# Patient Record
Sex: FEMALE | Race: WHITE | NOT HISPANIC OR LATINO | Employment: OTHER | URBAN - METROPOLITAN AREA
[De-identification: names, ages, dates, MRNs, and addresses within clinical notes are randomized per-mention and may not be internally consistent; named-entity substitution may affect disease eponyms.]

---

## 2017-04-03 ENCOUNTER — ALLSCRIPTS OFFICE VISIT (OUTPATIENT)
Dept: OTHER | Facility: OTHER | Age: 72
End: 2017-04-03

## 2017-04-03 DIAGNOSIS — E78.00 PURE HYPERCHOLESTEROLEMIA: ICD-10-CM

## 2017-04-03 DIAGNOSIS — Z12.31 ENCOUNTER FOR SCREENING MAMMOGRAM FOR MALIGNANT NEOPLASM OF BREAST: ICD-10-CM

## 2017-04-21 ENCOUNTER — ALLSCRIPTS OFFICE VISIT (OUTPATIENT)
Dept: OTHER | Facility: OTHER | Age: 72
End: 2017-04-21

## 2017-04-21 ENCOUNTER — GENERIC CONVERSION - ENCOUNTER (OUTPATIENT)
Dept: OTHER | Facility: OTHER | Age: 72
End: 2017-04-21

## 2017-05-10 ENCOUNTER — TRANSCRIBE ORDERS (OUTPATIENT)
Dept: LAB | Age: 72
End: 2017-05-10

## 2017-05-10 ENCOUNTER — APPOINTMENT (OUTPATIENT)
Dept: LAB | Age: 72
End: 2017-05-10
Payer: MEDICARE

## 2017-05-10 DIAGNOSIS — E78.00 PURE HYPERCHOLESTEROLEMIA: ICD-10-CM

## 2017-05-10 LAB
ALBUMIN SERPL BCP-MCNC: 3.9 G/DL (ref 3.5–5)
ALP SERPL-CCNC: 57 U/L (ref 46–116)
ALT SERPL W P-5'-P-CCNC: 27 U/L (ref 12–78)
ANION GAP SERPL CALCULATED.3IONS-SCNC: 7 MMOL/L (ref 4–13)
AST SERPL W P-5'-P-CCNC: 20 U/L (ref 5–45)
BILIRUB SERPL-MCNC: 0.48 MG/DL (ref 0.2–1)
BUN SERPL-MCNC: 18 MG/DL (ref 5–25)
CALCIUM SERPL-MCNC: 9.5 MG/DL (ref 8.3–10.1)
CHLORIDE SERPL-SCNC: 108 MMOL/L (ref 100–108)
CHOLEST SERPL-MCNC: 175 MG/DL (ref 50–200)
CK SERPL-CCNC: 94 U/L (ref 26–192)
CO2 SERPL-SCNC: 27 MMOL/L (ref 21–32)
CREAT SERPL-MCNC: 0.7 MG/DL (ref 0.6–1.3)
GFR SERPL CREATININE-BSD FRML MDRD: >60 ML/MIN/1.73SQ M
GLUCOSE P FAST SERPL-MCNC: 98 MG/DL (ref 65–99)
HDLC SERPL-MCNC: 58 MG/DL (ref 40–60)
LDLC SERPL CALC-MCNC: 93 MG/DL (ref 0–100)
POTASSIUM SERPL-SCNC: 4.3 MMOL/L (ref 3.5–5.3)
PROT SERPL-MCNC: 7.3 G/DL (ref 6.4–8.2)
SODIUM SERPL-SCNC: 142 MMOL/L (ref 136–145)
TRIGL SERPL-MCNC: 122 MG/DL

## 2017-05-10 PROCEDURE — 82550 ASSAY OF CK (CPK): CPT

## 2017-05-10 PROCEDURE — 36415 COLL VENOUS BLD VENIPUNCTURE: CPT

## 2017-05-10 PROCEDURE — 80053 COMPREHEN METABOLIC PANEL: CPT

## 2017-05-10 PROCEDURE — 80061 LIPID PANEL: CPT

## 2017-10-03 ENCOUNTER — GENERIC CONVERSION - ENCOUNTER (OUTPATIENT)
Dept: OTHER | Facility: OTHER | Age: 72
End: 2017-10-03

## 2017-10-03 DIAGNOSIS — Z12.31 ENCOUNTER FOR SCREENING MAMMOGRAM FOR MALIGNANT NEOPLASM OF BREAST: ICD-10-CM

## 2017-10-03 DIAGNOSIS — F32.9 MAJOR DEPRESSIVE DISORDER, SINGLE EPISODE: ICD-10-CM

## 2017-10-03 DIAGNOSIS — E78.00 PURE HYPERCHOLESTEROLEMIA: ICD-10-CM

## 2018-01-11 NOTE — PROGRESS NOTES
Assessment    1  Encounter for preventive health examination (V70 0) (Z00 00)   2  Hypercholesterolemia (272 0) (E78 00)   3  Encounter for screening mammogram for breast cancer (V76 12) (Z12 31)   4  Skin tag (701 9) (L91 8)    Plan  Depression    · *VB-Depression Screening; Status:Complete - Retrospective By Protocol Authorization;    Done: 21Apr2017 08:53AM  Encounter for Medicare annual wellness exam    · Medicare Annual Wellness Visit; Status:Complete - Retrospective By Protocol  Authorization;   Done: 21Apr2017 08:53AM  Encounter for screening mammogram for breast cancer    · * MAMMO SCREENING BILATERAL W CAD; Status:Hold For - Scheduling; Requested  for:21Apr2017; Health Maintenance    · *VB - Fall Risk Assessment  (Dx Z13 89 Screen for Neurologic Disorder) ; every 1 year; Last 21Apr2017; Next 21Apr2018; Status:Active   · *VB - Urinary Incontinence Screen (Dx Z13 89 Screen for UI) ; every 1 year; Last  21Apr2017; Next 58Nnz2028; Status:Active   · *VB-Depression Screening ; every 1 year; Last 21Apr2017; Next 21Apr2018;  Status:Active   · Medicare Annual Wellness Visit ; every 1 year;  Last 21Apr2017; Next 21Apr2018;  Status:Active  Hypercholesterolemia    · Begin a limited exercise program ; Status:Complete;   Done: 21Apr2017   · Continue with our present treatment plan ; Status:Complete;   Done: 21Apr2017   · Eat a low fat and low cholesterol diet ; Status:Complete;   Done: 21Apr2017   · Keep a diary of when and what you eat ; Status:Complete;   Done: 21Apr2017   · Some eating tips that can help you lose weight ; Status:Complete;   Done: 21Apr2017   · There are many exercise options for seniors ; Status:Complete;   Done: 21Apr2017   · We want you to follow the Therapeutic Lifestyle Changes (TLC) diet ; Status:Complete;    Done: 21Apr2017   · Call [phone] if: You have muscle cramps ; Status:Complete;   Done: 21Apr2017   · Call [phone] if: You have pain in the stomach area ; Status:Complete; Done:  21Apr2017   · Call [phone] if: You start vomiting ; Status:Complete;   Done: 21Apr2017   · Call 911 if: You experience a new kind of chest pain (angina) or pressure ;  Status:Complete;   Done: 21Apr2017   · Call 911 if: You have any symptoms of a stroke ; Status:Complete;   Done: 21Apr2017  Need for vaccination with 13-polyvalent pneumococcal conjugate vaccine    · Prevnar 13 Intramuscular Suspension  Special screening for other conditions    · *VB - Urinary Incontinence Screen (Dx Z13 89 Screen for UI); Status:Complete -  Retrospective By Protocol Authorization;   Done: 21Apr2017 08:53AM  Unlinked    · *VB - Fall Risk Assessment  (Dx Z13 89 Screen for Neurologic Disorder);  Status:Complete - Retrospective By Protocol Authorization;   Done: 21Apr2017 08:52AM    Discussion/Summary  Impression: Subsequent Annual Wellness Visit  Cardiovascular screening and counseling: the risks and benefits of screening were discussed and screening is current  Diabetes screening and counseling: the risks and benefits of screening were discussed and screening is current  Colorectal cancer screening and counseling: the risks and benefits of screening were discussed and screening is current  Breast cancer screening and counseling: the risks and benefits of screening were discussed and due for a screening mammogram    Cervical cancer screening and counseling: the risks and benefits of screening were discussed and screening is current  Osteoporosis screening and counseling: the risks and benefits of screening were discussed and screening is current  Immunizations: the risks and benefits of influenza vaccination were discussed with the patient, influenza vaccine is up to date this year, the risks and benefits of pneumococcal vaccination were discussed with the patient and Prevnar 13 given today  Due for Pneumovax 23 in 1 year  Advance Directive Planning: complete and up to date   Advice and education were given regarding fall risk reduction, nutrition (non-diabetic), skin self-exam and Continue with physical activity  Patient Discussion: plan discussed with the patient, Keep routine f/u  Have labs completed prior to next f/u appt  Keep f/u with ortho for your knee  Also schedule f/u appt to have skin tag to bra line and L occipital removed as these are aggrevated by clothing and brushing hair  Chief Complaint  pt  presents for SUB AWV  History of Present Illness  HPI: Pt here for AWV  Pt states that she has L knee pain that has been aggravating her more lately  She is taking OTC IBU with some improvement  Pt has completed PT in past and has been doing home exercises  Pt states she is going to f/u with ortho  Welcome to [employer] and Wellness Visits: The patient is being seen for the subsequent annual wellness visit  Medicare Screening and Risk Factors   Hospitalizations: no previous hospitalizations and she has been hospitalized 0 times  Medicare Screening Tests Risk Questions   Osteoporosis risk assessment:  and female gender  HIV risk assessment: none indicated  Drug and Alcohol Use: The patient is a former cigarette smoker and quit smoking 1980  The patient reports occasional alcohol use and drinking 1 drinks per week  Alcohol concern:   The patient has no concerns about alcohol abuse  She has never used illicit drugs  Diet and Physical Activity: Current diet includes unhealthy food choices, 1 servings of fruit per day, 1 servings of vegetables per day, 1 servings of meat per day, 1 servings of whole grains per day, 1 servings of simple carbohydrates per day, 1 servings of dairy products per day, 2 cups of coffee per day, 0 cups of tea per day, 0 cans of regular soda per day and 0 cans of diet soda per day  She exercises daily and exercises 1 times per week  Exercise: walking 7 hours per week     Mood Disorder and Cognitive Impairment Screening: PHQ-9 Depression Scale   Over the past 2 weeks, how often have you been bothered by the following problems? 1 ) Little interest or pleasure in doing things? Not at all    2 ) Feeling down, depressed or hopeless? Not at all    3 ) Trouble falling asleep or sleeping too much? Not at all    4 ) Feeling tired or having little energy? Not at all    5 ) Poor appetite or overeating? Not at all    6 ) Feeling bad about yourself, or that you are a failure, or have let yourself or your family down? Not at all    7 ) Trouble concentrating on things, such as reading a newspaper or watching television? Not at all    8 ) Moving or speaking so slowly that other people could have noticed, or the opposite, moving or speaking faster than usual? Not at all  TOTAL SCORE: 0    How difficult have these problems made it for you to do your work, take care of things at home, or get along with people? Not at all  She denies feeling down, depressed, or hopeless over the past two weeks  She denies feeling little interest or pleasure in doing things over the past two weeks  Cognitive impairment screening: denies difficulty learning/retaining new information, denies difficulty handling complex tasks, denies difficulty with reasoning, denies difficulty with spatial ability and orientation, denies difficulty with language and denies difficulty with behavior  Functional Ability/Level of Safety: Hearing is normal bilaterally and a hearing aid is not used  She denies hearing difficulties  The patient is currently able to do activities of daily living without limitations, able to participate in social activities without limitations and able to drive without limitations  Activities of daily living details: does not need help using the phone, no transportation help needed, does not need help shopping, no meal preparation help needed, does not need help doing housework, does not need help doing laundry, does not need help managing medications and does not need help managing money   Fall risk factors:  antidepressant use, but The patient fell 2 times in the past 12 months  Home safety risk factors:  no unfamiliar surroundings, no loose rugs, no poor household lighting, no uneven floors, no household clutter, grab bars in the bathroom and handrails on the stairs  Advance Directives: Advance directives: living will and durable power of  for health care directives  Co-Managers and Medical Equipment/Suppliers: See Patient Care Team   Preventive Quality Program 65 and Older: Falls Risk: The patient fell 2 times in the past 12 months  Symptoms Include: leg weakness  Associated symptoms:  pain from the injury   knee  The patient currently has no urinary incontinence symptoms  Review of Systems    Constitutional: no fever, no chills, no malaise and no fatigue  Eyes: last eye exam 2017, but no blurred vision  ENT: no hearing loss, no nasal congestion and no sore throat  Cardiovascular: no chest pain, no palpitations, the heart is not racing, no lightheadedness and no lower extremity edema  Respiratory: no shortness of breath, no wheezing and no cough  Gastrointestinal: no abdominal pain, no nausea, no vomiting, no diarrhea, no constipation and no bright red blood per rectum  Genitourinary: no dysuria, no urinary frequency, no urinary urgency and no pelvic pain  Musculoskeletal: no diffuse joint pain and no joint swelling  Integumentary and Breasts: no rashes    The patient presents with complaints of skin lesion (skin tag and mole under bra line that sometimes gets irritated with bra  skin tag to L occipital)  Neurological: no headache, no confusion, no dizziness, no paresthesias and no difficulty walking  Psychiatric: no anxiety and no depression  Active Problems    1  Depression (311) (F32 9)   2  Gastroesophageal reflux disease without esophagitis (530 81) (K21 9)   3  Hypercholesterolemia (272 0) (E78 00)   4  Loss of height (781 91) (R29 890)   5   Rosacea (695  3) (L71 9)    Past Medical History    · History of Abdominal bloating (787 3) (R14 0)   · History of Asymptomatic menopausal state (V49 81) (Z78 0)   · History of Atypical chest pain (786 59) (R07 89)   · History of Encounter for screening colonoscopy (V76 51) (Z12 11)   · History Of 3  Previous Pregnancies (V61 5)   · History of hyperlipidemia (V12 29) (Z86 39)   · History of low back pain (V13 59) (Z87 39)   · History of mammography, screening (V15 89) (Z92 89)   · History of Impacted cerumen of right ear (380 4) (H61 21)   · History of Mammogram   · History of Osteoporosis screening (V82 81) (Z13 820)   · History of Previous Pregnancies Resulted In 3  Living Children   · History of Screening for cervical cancer (V76 2) (Z12 4)   · History of Visit for screening mammogram (V76 12) (Z12 31)    The active problems and past medical history were reviewed and updated today  Surgical History    · History of Arthroscopy Knee   · History of Complete Colonoscopy   · History of Tonsillectomy With Adenoidectomy    The surgical history was reviewed and updated today  Family History  Mother    · Family history of High cholesterol   · Family history of Stroke Syndrome (V17 1)  Father    · Family history of prostate cancer (V16 42) (Z80 45)  Maternal Grandfather    · Family history of Stroke Syndrome (V17 1)  Maternal Aunt    · Family history of Breast Disorders  Family History    · Family history of Breast Disorders   · Denied: Family history of Coronary Artery Disease   · Family history of Family Health Status 3  Children Living   · Family history of Stroke Syndrome (V17 1)    The family history was reviewed and updated today         Social History    · Denied: History of Alcohol Use (History)   · Caffeine Use   · ADMITS TO CONSUMING SODA   · Daily Coffee Consumption (4  Cups/Day)   · Denied: History of Drug Use   · Exercising Regularly   · Former smoker (V15 82) (C71 491)   · [facility] · Marital History - Currently    · Occupation:   · CATERER   · Social alcohol use (Z78 9)  The social history was reviewed and updated today  The social history was reviewed and is unchanged  Current Meds   1  ALPRAZolam 0 5 MG Oral Tablet; TAKE 1 TABLET DAILY AS DIRECTED; Therapy: 63SZR1482 to (Evaluate:02Jun2017); Last Rx:03Apr2017 Ordered   2  Ibuprofen PM TABS; TAKE AS DIRECTED; Therapy: (Recorded:21Apr2017) to Recorded   3  MetroNIDAZOLE 0 75 % External Gel; APPLY  AND RUB  IN A THIN FILM TO   AFFECTED AREAS TWICE DAILY  (AM AND PM); Therapy: 19BNY4146 to (Last Rx:03Apr2017) Ordered   4  Omeprazole 20 MG Oral Capsule Delayed Release; TAKE 1 CAPSULE DAILY; Therapy: 69YGK8221 to (Cassie Beltran)  Requested for: 03Apr2017; Last   Rx:03Apr2017 Ordered   5  Sertraline HCl - 100 MG Oral Tablet; TAKE 1 TABLET DAILY  Requested for: 03Apr2017;   Last Rx:03Apr2017 Ordered   6  Simvastatin 20 MG Oral Tablet; TAKE 1 TABLET AT BEDTIME  Requested for:   03Apr2017; Last Rx:03Apr2017 Ordered    The medication list was reviewed and updated today  Allergies    1  Tetracyclines    2  No Known Environmental Allergies   3  No Known Food Allergies    Immunizations   1 2    Influenza  Approx 50Khf3130 21-Dec-2016     Vitals  Signs    Temperature: 97 F, Tympanic  Heart Rate: 83  Systolic: 093, LUE, Sitting  Diastolic: 82, LUE, Sitting  Height: 5 ft 4 29 in  Weight: 179 lb 2 oz  BMI Calculated: 30 47  BSA Calculated: 1 87  O2 Saturation: 98, RA    Physical Exam    Constitutional   General appearance: No acute distress, well appearing and well nourished  Head and Face   Head and face: Normal     Palpation of the face and sinuses: No sinus tenderness  Eyes   Conjunctiva and lids: No swelling, erythema or discharge  Pupils and irises: Equal, round, reactive to light      Ears, Nose, Mouth, and Throat   External inspection of ears and nose: Normal     Otoscopic examination: Tympanic membranes translucent with normal light reflex  Canals patent without erythema  minimal cerumen - no impaction  Hearing: Normal     Nasal mucosa, septum, and turbinates: Normal without edema or erythema  Lips, teeth, and gums: Normal, good dentition  Oropharynx: Normal with no erythema, edema, exudate or lesions  MMM  Neck   Neck: Supple, symmetric, trachea midline, no masses  Thyroid: Normal, no thyromegaly  Pulmonary   Respiratory effort: No increased work of breathing or signs of respiratory distress  Auscultation of lungs: Clear to auscultation  no rhonchi or wheezing  Cardiovascular   Auscultation of heart: Normal rate and rhythm, normal S1 and S2, no murmurs  no pedal edema  Abdomen   Abdomen: Non-tender, no masses  soft, + BS, ND, NT  Lymphatic   Palpation of lymph nodes in neck: No lymphadenopathy  Skin   Skin and subcutaneous tissue: Abnormal   No rash  + skin tag to L occipital  + skin tag and brown/black mole to bra line under R breast    Neurologic   Cranial nerves: Cranial nerves II-XII intact  Psychiatric   Judgment and insight: Normal     Orientation to person, place, and time: Normal     Recent and remote memory: Intact      Mood and affect: Normal        Results/Data  *VB - Urinary Incontinence Screen (Dx Z13 89 Screen for UI) 21Apr2017 08:53AM Carla McCune     Test Name Result Flag Reference   Urinary Incontinence Assessment 21Apr2017       *VB-Depression Screening 21Apr2017 08:53AM Carla McCune     Test Name Result Flag Reference   Depression Scale Result      Depression Screen - Negative For Symptoms     Medicare Annual Wellness Visit 21Apr2017 08:53AM Carla McCune     Test Name Result Flag Reference   MEDICARE [place] VISIT 21Apr2017       *VB - Fall Risk Assessment  (Dx Z13 89 Screen for Neurologic Disorder) 21Apr2017 08:52AM Carla McCune     Test Name Result Flag Reference   Falls Risk      2 or more falls past year       Health Management  History of Complete Colonoscopy COLONOSCOPY; every 5 years; Last 41NBL0883; Next Due: 18ORY4205; Active  History of Screening for HPV (human papillomavirus)   (1) PAP SMEAR CONVENTIONAL; every 5 years; Last 62YPZ7636; Next Due:  02YFG2715; Active  History of Visit for screening mammogram   Digital Bilateral Screening Mammogram With CAD; every 1 year; Last 48DFU0090; Next  Due: Q6040228; Overdue  Health Maintenance   *VB - Fall Risk Assessment  (Dx Z13 89 Screen for Neurologic Disorder); every 1 year; Last 02Hqr6552; Next Due: 30Clz9504; Active  *VB - Urinary Incontinence Screen (Dx Z13 89 Screen for UI); every 1 year; Last  43Gbf5006; Next Due: 86Nuv1716; Active  *VB-Depression Screening; every 1 year; Last 91Arm1389; Next Due: 21Apr2018; Active  Medicare Annual Wellness Visit; every 1 year; Last 74Edd0213; Next Due: 59Rem5764; Active    Signatures   Electronically signed by : Jean Hermosillo ;  Apr 21 2017  9:42AM EST                       (Author)    Electronically signed by : Cherry Adams DO; May  1 2017  2:54PM EST

## 2018-01-13 VITALS
WEIGHT: 180 LBS | HEIGHT: 64 IN | DIASTOLIC BLOOD PRESSURE: 70 MMHG | HEART RATE: 88 BPM | RESPIRATION RATE: 20 BRPM | SYSTOLIC BLOOD PRESSURE: 106 MMHG | TEMPERATURE: 98 F | OXYGEN SATURATION: 98 % | BODY MASS INDEX: 30.73 KG/M2

## 2018-01-13 VITALS
DIASTOLIC BLOOD PRESSURE: 82 MMHG | WEIGHT: 179.13 LBS | OXYGEN SATURATION: 98 % | HEIGHT: 64 IN | TEMPERATURE: 97 F | SYSTOLIC BLOOD PRESSURE: 108 MMHG | HEART RATE: 83 BPM | BODY MASS INDEX: 30.58 KG/M2

## 2018-01-16 NOTE — PROGRESS NOTES
Assessment    1  Impacted cerumen of right ear (380 4) (H61 21)    Plan  Impacted cerumen of right ear    · Follow Up if Not Better Evaluation and Treatment  Follow-up  Status: Complete  Done:  92DYW9260 02:05PM    Discussion/Summary    See procedure note  The patient was counseled regarding diagnostic results, instructions for management, risk factor reductions, prognosis  Chief Complaint  pt is here because he R ear has been blocked for a few days  c/o some pain and difficulty hearing      History of Present Illness  Ceruminosis (Brief): The patient is being seen for an initial evaluation of cerumen impaction  Symptoms:  plugged ear(s), ear pain and difficulty hearing, but no tinnitus and no dizziness  The patient is currently experiencing symptoms  No associated symptoms are reported  Review of Systems    Constitutional: No fever, no chills, feels well, no tiredness, no recent weight gain or loss  ENT: as noted in HPI  Cardiovascular: no complaints of slow or fast heart rate, no chest pain, no palpitations, no leg claudication or lower extremity edema  Respiratory: no complaints of shortness of breath, no wheezing, no dyspnea on exertion, no orthopnea or PND  Gastrointestinal: no complaints of abdominal pain, no constipation, no nausea or diarrhea, no vomiting, no bloody stools  Musculoskeletal: no complaints of arthralgia, no myalgia, no joint swelling or stiffness, no limb pain or swelling  Active Problems    1  Depression (311) (F32 9)   2  Hypercholesterolemia (272 0) (E78 0)   3  Loss of height (781 91) (R29 890)   4  Osteoporosis screening (V82 81) (Z13 820)    Past Medical History  Active Problems And Past Medical History Reviewed: The active problems and past medical history were reviewed and updated today        Social History    · Denied: History of Alcohol Use (History)   · Caffeine Use   · ADMITS TO CONSUMING SODA   · Daily Coffee Consumption (4  Cups/Day)   · Denied: History of Drug Use   · Exercising Regularly   · Former smoker (O53 46) (D03 836)   · HISTORY OF SMOKING QUIT DATE 1980   · Marital History - Currently    · 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  Family History  Family History Reviewed: The family history was reviewed and updated today  Current Meds   1  Sertraline HCl - 100 MG Oral Tablet; TAKE 1 TABLET DAILY  Requested for: 74NOP4651; Last Rx:97Laz5863 Ordered   2  Simvastatin 20 MG Oral Tablet; TAKE 1 TABLET DAILY AT BEDTIME  Requested for:   06JJK7660; Last Rx:07Nhq5177 Ordered    The medication list was reviewed and updated today  Allergies    1  Tetracycline HCl (Topical) POWD   2  Tetracycline HCl CAPS   3  Tetracycline HCl Kit   4  Tetracycline HCl POWD    5  No Known Environmental Allergies   6  No Known Food Allergies    Vitals   Recorded: 54DJQ5871 01:55PM   Temperature 98 F, Oral   Heart Rate 95   Systolic 853, LUE, Sitting   Diastolic 72, LUE, Sitting   BP Cuff Size Large   Height 5 ft 4 5 in   Weight 179 lb 6 oz   BMI Calculated 30 31   BSA Calculated 1 88   O2 Saturation 96, RA     Physical Exam    Constitutional   General appearance: No acute distress, well appearing and well nourished  Eyes   Conjunctiva and lids: No swelling, erythema or discharge  Ears, Nose, Mouth, and Throat   Otoscopic examination: Abnormal   The right external canal had a cerumen impaction  The left external canal did not have a cerumen impaction  Oropharynx: Normal with no erythema, edema, exudate or lesions  Pulmonary   Auscultation of lungs: Clear to auscultation  Cardiovascular   Auscultation of heart: Normal rate and rhythm, normal S1 and S2, without murmurs  Examination of extremities for edema and/or varicosities: Normal     Carotid pulses: Normal     Abdomen   Abdomen: Non-tender, no masses  Neurologic   Cranial nerves: Cranial nerves 2-12 intact           Procedure            Procedure: cerumen removal    Indication: in the right ear  Prep: Cerumenex was placed in the canal prior to the procedure  Procedure Note: The procedure was performed by the Provider  A otoscope was placed in the ear canal(s) to visualize the ear canal debris  The ear was cleaned by using warm water irrigation  The procedure was successful  Post-Procedure:   Patient Status: the patient tolerated the procedure well  Patient instructions: dry ear precautions  Follow-up as needed  Future Appointments    Date/Time Provider Specialty Site   02/05/2016 10:15 AM Marce Diop DO Internal Medicine [facility]     Signatures   Electronically signed by :  Manolo Mtz MD; Jan 25 2016  2:06PM EST                       (Author)

## 2018-01-22 VITALS
SYSTOLIC BLOOD PRESSURE: 110 MMHG | HEIGHT: 64 IN | HEART RATE: 80 BPM | DIASTOLIC BLOOD PRESSURE: 80 MMHG | BODY MASS INDEX: 30.48 KG/M2 | TEMPERATURE: 97.6 F | WEIGHT: 178.5 LBS | OXYGEN SATURATION: 97 %

## 2018-04-16 DIAGNOSIS — E78.5 HYPERLIPIDEMIA, UNSPECIFIED HYPERLIPIDEMIA TYPE: ICD-10-CM

## 2018-04-16 DIAGNOSIS — F41.9 ANXIETY: Primary | ICD-10-CM

## 2018-04-16 RX ORDER — SERTRALINE HYDROCHLORIDE 100 MG/1
100 TABLET, FILM COATED ORAL DAILY
Qty: 30 TABLET | Refills: 0 | Status: SHIPPED | OUTPATIENT
Start: 2018-04-16 | End: 2018-05-22 | Stop reason: SDUPTHER

## 2018-04-16 RX ORDER — SERTRALINE HYDROCHLORIDE 100 MG/1
1 TABLET, FILM COATED ORAL DAILY
COMMUNITY
End: 2018-04-16 | Stop reason: SDUPTHER

## 2018-04-16 RX ORDER — SIMVASTATIN 20 MG
1 TABLET ORAL
COMMUNITY
End: 2018-04-16 | Stop reason: SDUPTHER

## 2018-04-16 RX ORDER — SIMVASTATIN 20 MG
20 TABLET ORAL
Qty: 30 TABLET | Refills: 0 | Status: SHIPPED | OUTPATIENT
Start: 2018-04-16 | End: 2018-05-22 | Stop reason: SDUPTHER

## 2018-05-22 DIAGNOSIS — F41.9 ANXIETY: ICD-10-CM

## 2018-05-22 DIAGNOSIS — E78.5 HYPERLIPIDEMIA, UNSPECIFIED HYPERLIPIDEMIA TYPE: ICD-10-CM

## 2018-05-22 RX ORDER — SIMVASTATIN 20 MG
20 TABLET ORAL
Qty: 30 TABLET | Refills: 0 | Status: SHIPPED | OUTPATIENT
Start: 2018-05-22 | End: 2018-06-28 | Stop reason: SDUPTHER

## 2018-05-22 RX ORDER — SERTRALINE HYDROCHLORIDE 100 MG/1
100 TABLET, FILM COATED ORAL DAILY
Qty: 30 TABLET | Refills: 0 | Status: SHIPPED | OUTPATIENT
Start: 2018-05-22 | End: 2018-06-28 | Stop reason: SDUPTHER

## 2018-06-28 DIAGNOSIS — F41.9 ANXIETY: ICD-10-CM

## 2018-06-28 DIAGNOSIS — E78.5 HYPERLIPIDEMIA, UNSPECIFIED HYPERLIPIDEMIA TYPE: ICD-10-CM

## 2018-06-28 NOTE — TELEPHONE ENCOUNTER
Patient left a message with our service on Saturday 6/23/18 and as of today, nothing was ready at the pharmacy  She needs sertraline 20 mg and simvastatin 100 mg  I'm not sure why the service did not forward this request  Can this possibly be filled by tomorrow as the patient is now out of medication?

## 2018-06-29 RX ORDER — SIMVASTATIN 20 MG
20 TABLET ORAL
Qty: 30 TABLET | Refills: 0 | Status: SHIPPED | OUTPATIENT
Start: 2018-06-29 | End: 2018-07-30 | Stop reason: SDUPTHER

## 2018-06-29 RX ORDER — SERTRALINE HYDROCHLORIDE 100 MG/1
100 TABLET, FILM COATED ORAL DAILY
Qty: 30 TABLET | Refills: 0 | Status: SHIPPED | OUTPATIENT
Start: 2018-06-29 | End: 2018-07-30 | Stop reason: SDUPTHER

## 2018-06-29 NOTE — TELEPHONE ENCOUNTER
Pt  States that she will be physically coming down to the office on Tuesday (7/3/18) and will set up an appointment then  She'll also be getting her labs done before the next appointment

## 2018-07-30 DIAGNOSIS — E78.5 HYPERLIPIDEMIA, UNSPECIFIED HYPERLIPIDEMIA TYPE: ICD-10-CM

## 2018-07-30 DIAGNOSIS — F41.9 ANXIETY: ICD-10-CM

## 2018-07-30 RX ORDER — SIMVASTATIN 20 MG
20 TABLET ORAL
Qty: 90 TABLET | Refills: 1 | Status: SHIPPED | OUTPATIENT
Start: 2018-07-30 | End: 2019-01-11 | Stop reason: SDUPTHER

## 2018-07-30 RX ORDER — SERTRALINE HYDROCHLORIDE 100 MG/1
100 TABLET, FILM COATED ORAL DAILY
Qty: 90 TABLET | Refills: 1 | Status: SHIPPED | OUTPATIENT
Start: 2018-07-30 | End: 2019-01-11 | Stop reason: SDUPTHER

## 2018-08-06 ENCOUNTER — APPOINTMENT (OUTPATIENT)
Dept: LAB | Age: 73
End: 2018-08-06
Payer: MEDICARE

## 2018-08-06 DIAGNOSIS — E78.00 PURE HYPERCHOLESTEROLEMIA: ICD-10-CM

## 2018-08-06 DIAGNOSIS — F32.9 MAJOR DEPRESSIVE DISORDER, SINGLE EPISODE: ICD-10-CM

## 2018-08-06 LAB
ALBUMIN SERPL BCP-MCNC: 3.6 G/DL (ref 3.5–5)
ALP SERPL-CCNC: 56 U/L (ref 46–116)
ALT SERPL W P-5'-P-CCNC: 21 U/L (ref 12–78)
ANION GAP SERPL CALCULATED.3IONS-SCNC: 8 MMOL/L (ref 4–13)
AST SERPL W P-5'-P-CCNC: 15 U/L (ref 5–45)
BILIRUB SERPL-MCNC: 0.47 MG/DL (ref 0.2–1)
BUN SERPL-MCNC: 17 MG/DL (ref 5–25)
CALCIUM SERPL-MCNC: 9.1 MG/DL (ref 8.3–10.1)
CHLORIDE SERPL-SCNC: 108 MMOL/L (ref 100–108)
CHOLEST SERPL-MCNC: 191 MG/DL (ref 50–200)
CK SERPL-CCNC: 97 U/L (ref 26–192)
CO2 SERPL-SCNC: 24 MMOL/L (ref 21–32)
CREAT SERPL-MCNC: 0.73 MG/DL (ref 0.6–1.3)
GFR SERPL CREATININE-BSD FRML MDRD: 83 ML/MIN/1.73SQ M
GLUCOSE P FAST SERPL-MCNC: 102 MG/DL (ref 65–99)
HDLC SERPL-MCNC: 66 MG/DL (ref 40–60)
LDLC SERPL CALC-MCNC: 103 MG/DL (ref 0–100)
POTASSIUM SERPL-SCNC: 4 MMOL/L (ref 3.5–5.3)
PROT SERPL-MCNC: 7.1 G/DL (ref 6.4–8.2)
SODIUM SERPL-SCNC: 140 MMOL/L (ref 136–145)
TRIGL SERPL-MCNC: 111 MG/DL
TSH SERPL DL<=0.05 MIU/L-ACNC: 1.95 UIU/ML (ref 0.36–3.74)

## 2018-08-06 PROCEDURE — 80061 LIPID PANEL: CPT

## 2018-08-06 PROCEDURE — 82550 ASSAY OF CK (CPK): CPT

## 2018-08-06 PROCEDURE — 36415 COLL VENOUS BLD VENIPUNCTURE: CPT

## 2018-08-06 PROCEDURE — 84443 ASSAY THYROID STIM HORMONE: CPT

## 2018-08-06 PROCEDURE — 80053 COMPREHEN METABOLIC PANEL: CPT

## 2018-08-07 ENCOUNTER — OFFICE VISIT (OUTPATIENT)
Dept: INTERNAL MEDICINE CLINIC | Age: 73
End: 2018-08-07
Payer: MEDICARE

## 2018-08-07 VITALS
WEIGHT: 176.2 LBS | BODY MASS INDEX: 30.24 KG/M2 | DIASTOLIC BLOOD PRESSURE: 64 MMHG | TEMPERATURE: 98.1 F | HEART RATE: 89 BPM | OXYGEN SATURATION: 96 % | SYSTOLIC BLOOD PRESSURE: 108 MMHG

## 2018-08-07 DIAGNOSIS — F41.9 ANXIETY: ICD-10-CM

## 2018-08-07 DIAGNOSIS — E78.00 HYPERCHOLESTEROLEMIA: ICD-10-CM

## 2018-08-07 DIAGNOSIS — K21.9 GASTROESOPHAGEAL REFLUX DISEASE WITHOUT ESOPHAGITIS: ICD-10-CM

## 2018-08-07 DIAGNOSIS — Z12.39 BREAST CANCER SCREENING: Primary | ICD-10-CM

## 2018-08-07 DIAGNOSIS — F32.89 OTHER DEPRESSION: ICD-10-CM

## 2018-08-07 PROCEDURE — 99214 OFFICE O/P EST MOD 30 MIN: CPT | Performed by: FAMILY MEDICINE

## 2018-08-07 RX ORDER — ALPRAZOLAM 0.5 MG/1
0.5 TABLET ORAL DAILY
Qty: 30 TABLET | Refills: 0 | Status: SHIPPED | OUTPATIENT
Start: 2018-08-07 | End: 2019-08-16 | Stop reason: SDUPTHER

## 2018-08-07 RX ORDER — ALPRAZOLAM 0.5 MG/1
1 TABLET ORAL DAILY
COMMUNITY
Start: 2015-10-06 | End: 2018-08-07 | Stop reason: SDUPTHER

## 2018-08-07 NOTE — PROGRESS NOTES
Assessment/Plan:    No problem-specific Assessment & Plan notes found for this encounter  Problem List Items Addressed This Visit        Digestive    Gastroesophageal reflux disease without esophagitis       Other    Hypercholesterolemia    Depression    Relevant Medications    ALPRAZolam (XANAX) 0 5 mg tablet    Ibuprofen-Diphenhydramine Cit 200-38 MG TABS      Other Visit Diagnoses     Breast cancer screening    -  Primary    Anxiety                Subjective:  F/up hld     Patient ID: Amanda Luis is a 72 y o  female  HPI  Depression (Follow-Up): The patient states her depression has been stable since the last visit  She has no comorbid illnesses  Interval Events: doing well but  recently dx with lung cancer after brain surgery and colonoscopy  She has had no significant interval events  Interval Symptoms: improved depression,-improved depressed mood,-improved loss of interest or pleasure in activities,-stable insomnia,-denies feelings of worthlessness,-denies feelings of guilt,-denies trouble concentrating,-improved anxiety-and-denies sexual dysfunction  Associated symptoms include: no recent weight gain-and-no thoughts of suicide  Social Support: the patient has good social support  Medications: the patient is adherent with her medication regimen -She denies medication side effects  Pt has been on zoloft >5 years  States she feels really stable on it and does not feel the need to go off of it     Hyperlipidemia (Follow-Up): The patient states her hyperlipidemia has been under good control since the last visit  She has no comorbid illnesses  Symptoms: denies chest pain,-denies intermittent leg claudication,-denies muscle pain-and-denies muscle weakness  Associated symptoms include no focal neurologic deficits-and-no memory loss  Medications: the patient is adherent with her medication regimen -the patient complains of medication side effects   The patient is doing well with her hyperlipidemia goals  the patient's LDL goal is <100 mg/dL  -the patient's last LDL was 72 mg/dL  -(5/2014)  Gastroesophageal Reflux Disease (Follow-Up): The patient is being seen for follow-up of gastroesophageal reflux disease         Review of Systems      Constitutional:  Denies fever or chills   Eyes:  Denies change in visual acuity   HENT:  Denies nasal congestion or sore throat   Respiratory:  Denies cough or shortness of breath or wheezing  Cardiovascular:  Denies palpitations or chest pain  GI:  Denies abdominal pain, nausea, or vomiting  Integument:  Denies rash   Neurologic:  Denies headache or focal weakness        Objective:      /64 (BP Location: Left arm, Patient Position: Sitting, Cuff Size: Standard)   Pulse 89   Temp 98 1 °F (36 7 °C) (Oral)   Wt 79 9 kg (176 lb 3 2 oz)   SpO2 96%   BMI 30 24 kg/m²          Physical Exam    Constitutional:  Well developed, well nourished, no acute distress, non-toxic appearance   Eyes:  PERRL, conjunctiva normal , non icteric sclera  HENT:  Atraumatic, oropharynx moist  Neck-  supple   Respiratory:  CTA b/l, normal breath sounds, no rales, no wheezing   Cardiovascular:  RRR, no murmurs, no LE edema b/l  GI:  Soft, nondistended, normal bowel sounds x 4, nontender, no organomegaly, no mass, no rebound, no guarding   Neurologic:  no focal deficits noted   Psychiatric:  Speech and behavior appropriate , AAO x 3

## 2019-01-07 ENCOUNTER — OFFICE VISIT (OUTPATIENT)
Dept: INTERNAL MEDICINE CLINIC | Age: 74
End: 2019-01-07
Payer: MEDICARE

## 2019-01-07 VITALS
SYSTOLIC BLOOD PRESSURE: 102 MMHG | HEART RATE: 91 BPM | OXYGEN SATURATION: 97 % | HEIGHT: 65 IN | DIASTOLIC BLOOD PRESSURE: 62 MMHG | WEIGHT: 170.6 LBS | TEMPERATURE: 97.5 F | BODY MASS INDEX: 28.42 KG/M2

## 2019-01-07 DIAGNOSIS — J01.00 ACUTE MAXILLARY SINUSITIS, RECURRENCE NOT SPECIFIED: Primary | ICD-10-CM

## 2019-01-07 DIAGNOSIS — J34.89 SINUS PRESSURE: ICD-10-CM

## 2019-01-07 PROBLEM — J32.9 SINUSITIS: Status: ACTIVE | Noted: 2019-01-07

## 2019-01-07 PROCEDURE — 99213 OFFICE O/P EST LOW 20 MIN: CPT | Performed by: NURSE PRACTITIONER

## 2019-01-07 RX ORDER — FLUTICASONE PROPIONATE 50 MCG
1 SPRAY, SUSPENSION (ML) NASAL DAILY
Qty: 1 BOTTLE | Refills: 0 | Status: SHIPPED | OUTPATIENT
Start: 2019-01-07 | End: 2019-05-31

## 2019-01-07 NOTE — ASSESSMENT & PLAN NOTE
Advised patient to the start taking over-the-counter Mucinex, and to use Flonase 2 sprays each nostril daily  Patient does have impacted cerumen  Bilaterally did advise patient to get over-the-counter Debrox drops, and to return back to her office to have her ears flushed  If patient's symptoms do not improve by Friday, or worsen patient is to call our office back and will consider starting antibiotic  Illness appears to be viral in nature  Rest and fluids advised  Educated that the course of this illness could be 2-4 weeks  Discussed symptomatic relief, such as warm steam inhalations, tylenol/ibuprofen for fevers and body aches, rest, and drink plenty of fluids  Warm salt gargles for sore throat  Discussed red flag signs to go to the ER, such as chest pain or shortness of breath     Return to the office for reevaluation if symptoms worsen or do not improve in 1-2 weeks

## 2019-01-07 NOTE — PROGRESS NOTES
Assessment/Plan:    Sinus pressure   Advised patient to the start taking over-the-counter Mucinex, and to use Flonase 2 sprays each nostril daily  Patient does have impacted cerumen  Bilaterally did advise patient to get over-the-counter Debrox drops, and to return back to her office to have her ears flushed  If patient's symptoms do not improve by Friday, or worsen patient is to call our office back and will consider starting antibiotic  Illness appears to be viral in nature  Rest and fluids advised  Educated that the course of this illness could be 2-4 weeks  Discussed symptomatic relief, such as warm steam inhalations, tylenol/ibuprofen for fevers and body aches, rest, and drink plenty of fluids  Warm salt gargles for sore throat  Discussed red flag signs to go to the ER, such as chest pain or shortness of breath  Return to the office for reevaluation if symptoms worsen or do not improve in 1-2 weeks         Diagnoses and all orders for this visit:    Acute maxillary sinusitis, recurrence not specified  -     fluticasone (FLONASE) 50 mcg/act nasal spray; 1 spray into each nostril daily    Sinus pressure          Subjective:      Patient ID: Christel Martin is a 72 y o  female  Patient presents today with complaints of sinus headache for 4 days  Patient reports that he headache has let up a little today and is not as bad as the previous days  Has history of seasonal allergies  Denies sick contacts  Sinusitis   This is a new problem  Episode onset: 4 days ago  The problem is unchanged  There has been no fever  The pain is mild  Associated symptoms include chills, congestion, coughing, diaphoresis, ear pain, headaches, sinus pressure, sneezing and a sore throat (mild)  Pertinent negatives include no neck pain or shortness of breath  Treatments tried: sudafed  The treatment provided no relief         The following portions of the patient's history were reviewed and updated as appropriate: allergies, current medications, past family history, past medical history, past social history, past surgical history and problem list     Review of Systems   Constitutional: Positive for appetite change ("nothing tastes good"), chills, diaphoresis and fatigue  Negative for activity change and fever  HENT: Positive for congestion, ear pain, rhinorrhea, sinus pressure, sneezing and sore throat (mild)  Negative for ear discharge, postnasal drip and sinus pain  Eyes: Negative for pain, discharge, itching and visual disturbance  Respiratory: Positive for cough  Negative for chest tightness, shortness of breath and wheezing  Cardiovascular: Negative for chest pain, palpitations and leg swelling  Gastrointestinal: Positive for nausea  Negative for abdominal pain, constipation, diarrhea and vomiting  Endocrine: Negative for polydipsia, polyphagia and polyuria  Genitourinary: Negative for difficulty urinating, dysuria and urgency  Musculoskeletal: Negative for arthralgias, back pain and neck pain  Skin: Negative for rash and wound  Neurological: Positive for headaches  Negative for dizziness, weakness and numbness           Past Medical History:   Diagnosis Date    Hyperlipidemia     Skin tag     last assessed 4/21/17, resolved 10/2/17          Current Outpatient Prescriptions:     ALPRAZolam (XANAX) 0 5 mg tablet, Take 1 tablet (0 5 mg total) by mouth daily, Disp: 30 tablet, Rfl: 0    Ibuprofen-Diphenhydramine Cit 200-38 MG TABS, Take by mouth as needed, Disp: , Rfl:     sertraline (ZOLOFT) 100 mg tablet, Take 1 tablet (100 mg total) by mouth daily, Disp: 90 tablet, Rfl: 1    simvastatin (ZOCOR) 20 mg tablet, Take 1 tablet (20 mg total) by mouth daily at bedtime, Disp: 90 tablet, Rfl: 1    fluticasone (FLONASE) 50 mcg/act nasal spray, 1 spray into each nostril daily, Disp: 1 Bottle, Rfl: 0    Allergies   Allergen Reactions    Tetracyclines & Related      blisters       Social History   Past Surgical History:   Procedure Laterality Date    COLONOSCOPY      Complete, with Dr Meyers, resolved 7/11/13    KNEE ARTHROSCOPY Left     resolved 2003    KNEE ARTHROSCOPY W/ MENISCAL REPAIR Left 2000    TONSILLECTOMY AND ADENOIDECTOMY       Family History   Problem Relation Age of Onset    Stroke Mother         CVA    Hyperlipidemia Mother     Stroke Maternal Grandfather         CVA    Stroke Family         CVA    Other Family         breast disorders    Prostate cancer Father     Other Maternal Aunt         breast disorders        Objective:  /62 (BP Location: Left arm, Patient Position: Sitting, Cuff Size: Adult)   Pulse 91   Temp 97 5 °F (36 4 °C) (Tympanic)   Ht 5' 4 53" (1 639 m)   Wt 77 4 kg (170 lb 9 6 oz)   SpO2 97%   BMI 28 81 kg/m²     No results found for this or any previous visit (from the past 1344 hour(s))  Physical Exam   Constitutional: She is oriented to person, place, and time  She appears well-developed and well-nourished  No distress  HENT:   Head: Normocephalic and atraumatic  Right Ear: External ear normal    Left Ear: External ear normal    Nose: Mucosal edema and rhinorrhea present  Right sinus exhibits maxillary sinus tenderness  Right sinus exhibits no frontal sinus tenderness  Left sinus exhibits maxillary sinus tenderness  Left sinus exhibits no frontal sinus tenderness  Mouth/Throat: Mucous membranes are pale and dry  Posterior oropharyngeal erythema present  No oropharyngeal exudate or posterior oropharyngeal edema  Unable to visualize tympanic membranes due to bilateral cerumen impaction   Eyes: Pupils are equal, round, and reactive to light  Conjunctivae and EOM are normal  Right eye exhibits no discharge  Left eye exhibits no discharge  Neck: Normal range of motion  Neck supple  No thyromegaly present  Cardiovascular: Normal rate, regular rhythm, normal heart sounds and intact distal pulses  Exam reveals no gallop and no friction rub  No murmur heard  Pulmonary/Chest: Effort normal and breath sounds normal  No stridor  No respiratory distress  She has no wheezes  She has no rales  Abdominal: Soft  Bowel sounds are normal  She exhibits no distension  There is no tenderness  Lymphadenopathy:     She has no cervical adenopathy  Neurological: She is alert and oriented to person, place, and time  Skin: Skin is warm and dry  No rash noted  She is not diaphoretic  No erythema  Psychiatric: She has a normal mood and affect   Her behavior is normal  Judgment and thought content normal

## 2019-01-10 PROCEDURE — 69210 REMOVE IMPACTED EAR WAX UNI: CPT | Performed by: NURSE PRACTITIONER

## 2019-01-10 NOTE — PROGRESS NOTES
Assessment/Plan:    Bilateral impacted cerumen  Patients ears flushed while in office today  Advised patient to avoid use of Q-tips, and to continue to use flonase  Sinus pressure  Patient symptoms have improved, patient will continue to use flonase  Diagnoses and all orders for this visit:    Bilateral impacted cerumen  -     Ear cerumen removal    Anxiety  -     sertraline (ZOLOFT) 100 mg tablet; Take 1 tablet (100 mg total) by mouth daily    Hyperlipidemia, unspecified hyperlipidemia type  -     simvastatin (ZOCOR) 20 mg tablet; Take 1 tablet (20 mg total) by mouth daily at bedtime    Sinus pressure          Subjective:      Patient ID: Jose Guillermo is a 72 y o  female  Patient presents today for ear lavage, and follow up on sinus headache  Sinusitis   This is a new problem  Episode onset: 4 days ago  The problem is unchanged  There has been no fever  The pain is mild  Associated symptoms include coughing and sinus pressure  Pertinent negatives include no chills, congestion, diaphoresis, ear pain, headaches, neck pain, shortness of breath, sneezing or sore throat (mild)  Treatments tried: mucinex and flonase  The treatment provided moderate relief  The following portions of the patient's history were reviewed and updated as appropriate: allergies, current medications, past family history, past medical history, past social history, past surgical history and problem list     Review of Systems   Constitutional: Negative for activity change, appetite change, chills, diaphoresis and fever  HENT: Positive for sinus pressure  Negative for congestion, ear discharge, ear pain, postnasal drip, rhinorrhea, sinus pain, sneezing and sore throat (mild)  Eyes: Negative for pain, discharge, itching and visual disturbance  Respiratory: Positive for cough  Negative for chest tightness, shortness of breath and wheezing  Cardiovascular: Negative for chest pain, palpitations and leg swelling  Gastrointestinal: Negative for abdominal pain, constipation, diarrhea, nausea and vomiting  Endocrine: Negative for polydipsia, polyphagia and polyuria  Genitourinary: Negative for difficulty urinating, dysuria and urgency  Musculoskeletal: Negative for arthralgias, back pain and neck pain  Skin: Negative for rash and wound  Neurological: Negative for dizziness, weakness, numbness and headaches           Past Medical History:   Diagnosis Date    Hyperlipidemia     Skin tag     last assessed 4/21/17, resolved 10/2/17          Current Outpatient Prescriptions:     ALPRAZolam (XANAX) 0 5 mg tablet, Take 1 tablet (0 5 mg total) by mouth daily, Disp: 30 tablet, Rfl: 0    fluticasone (FLONASE) 50 mcg/act nasal spray, 1 spray into each nostril daily, Disp: 1 Bottle, Rfl: 0    Ibuprofen-Diphenhydramine Cit 200-38 MG TABS, Take by mouth as needed, Disp: , Rfl:     sertraline (ZOLOFT) 100 mg tablet, Take 1 tablet (100 mg total) by mouth daily, Disp: 90 tablet, Rfl: 1    simvastatin (ZOCOR) 20 mg tablet, Take 1 tablet (20 mg total) by mouth daily at bedtime, Disp: 90 tablet, Rfl: 1    Allergies   Allergen Reactions    Tetracyclines & Related      blisters       Social History   Past Surgical History:   Procedure Laterality Date    COLONOSCOPY      Complete, with Dr Meyers, resolved 7/11/13    KNEE ARTHROSCOPY Left     resolved 2003    KNEE ARTHROSCOPY W/ MENISCAL REPAIR Left 2000    TONSILLECTOMY AND ADENOIDECTOMY       Family History   Problem Relation Age of Onset    Stroke Mother         CVA    Hyperlipidemia Mother     Stroke Maternal Grandfather         CVA    Stroke Family         CVA    Other Family         breast disorders    Prostate cancer Father     Other Maternal Aunt         breast disorders        Objective:  /78 (BP Location: Left arm, Patient Position: Sitting, Cuff Size: Adult)   Pulse 80   Temp 97 8 °F (36 6 °C) (Tympanic)   Ht 5' 4 88" (1 648 m)   Wt 78 7 kg (173 lb 6 4 oz)   SpO2 96%   BMI 28 96 kg/m²     No results found for this or any previous visit (from the past 1344 hour(s))  Physical Exam   Constitutional: She is oriented to person, place, and time  She appears well-developed and well-nourished  No distress  HENT:   Head: Normocephalic and atraumatic  Right Ear: External ear normal  Tympanic membrane is erythematous (mild) and bulging  A middle ear effusion is present  Left Ear: External ear normal  Tympanic membrane is bulging  Tympanic membrane is not erythematous  A middle ear effusion is present  Nose: Mucosal edema and rhinorrhea present  Right sinus exhibits no maxillary sinus tenderness and no frontal sinus tenderness  Left sinus exhibits no maxillary sinus tenderness and no frontal sinus tenderness  Mouth/Throat: Mucous membranes are pale and dry  Posterior oropharyngeal erythema present  No oropharyngeal exudate  Eyes: Pupils are equal, round, and reactive to light  Conjunctivae and EOM are normal  Right eye exhibits no discharge  Left eye exhibits no discharge  Neck: Normal range of motion  Neck supple  No thyromegaly present  Cardiovascular: Normal rate, regular rhythm, normal heart sounds and intact distal pulses  Exam reveals no gallop and no friction rub  No murmur heard  Pulmonary/Chest: Effort normal and breath sounds normal  No stridor  No respiratory distress  She has no wheezes  She has no rales  Abdominal: Soft  Bowel sounds are normal  She exhibits no distension  There is no tenderness  Lymphadenopathy:     She has no cervical adenopathy  Neurological: She is alert and oriented to person, place, and time  Skin: Skin is warm and dry  No rash noted  She is not diaphoretic  No erythema  Psychiatric: She has a normal mood and affect  Her behavior is normal  Judgment and thought content normal        Ear cerumen removal  Date/Time: 1/10/2019 1:50 PM  Performed by: Thad Diaz   Authorized by: Sunshine Nava Sissy Boyd Patient location:  Bedside  Consent:     Consent obtained:  Verbal    Consent given by:  Patient    Risks discussed:  Dizziness, incomplete removal, infection and pain    Alternatives discussed: debrox drops  Universal protocol:     Patient identity confirmed:  Verbally with patient  Procedure details:     Local anesthetic:  None    Location:  L ear and R ear    Procedure type: irrigation      Approach:  External    Visualization (free text):  Otoscope    Equipment used:  Alligator forceps  Post-procedure details:     Complication:  None    Hearing quality:  Improved    Patient tolerance of procedure:   Tolerated well, no immediate complications

## 2019-01-11 ENCOUNTER — OFFICE VISIT (OUTPATIENT)
Dept: INTERNAL MEDICINE CLINIC | Age: 74
End: 2019-01-11
Payer: MEDICARE

## 2019-01-11 VITALS
SYSTOLIC BLOOD PRESSURE: 120 MMHG | OXYGEN SATURATION: 96 % | HEIGHT: 65 IN | TEMPERATURE: 97.8 F | BODY MASS INDEX: 28.89 KG/M2 | HEART RATE: 80 BPM | WEIGHT: 173.4 LBS | DIASTOLIC BLOOD PRESSURE: 78 MMHG

## 2019-01-11 DIAGNOSIS — H61.23 BILATERAL IMPACTED CERUMEN: Primary | ICD-10-CM

## 2019-01-11 DIAGNOSIS — E78.5 HYPERLIPIDEMIA, UNSPECIFIED HYPERLIPIDEMIA TYPE: ICD-10-CM

## 2019-01-11 DIAGNOSIS — F41.9 ANXIETY: ICD-10-CM

## 2019-01-11 DIAGNOSIS — J34.89 SINUS PRESSURE: ICD-10-CM

## 2019-01-11 PROCEDURE — 99212 OFFICE O/P EST SF 10 MIN: CPT | Performed by: NURSE PRACTITIONER

## 2019-01-11 RX ORDER — SERTRALINE HYDROCHLORIDE 100 MG/1
100 TABLET, FILM COATED ORAL DAILY
Qty: 90 TABLET | Refills: 1 | Status: SHIPPED | OUTPATIENT
Start: 2019-01-11 | End: 2019-07-30 | Stop reason: SDUPTHER

## 2019-01-11 RX ORDER — SIMVASTATIN 20 MG
20 TABLET ORAL
Qty: 90 TABLET | Refills: 1 | Status: SHIPPED | OUTPATIENT
Start: 2019-01-11 | End: 2019-07-30 | Stop reason: SDUPTHER

## 2019-01-11 NOTE — ASSESSMENT & PLAN NOTE
Patients ears flushed while in office today  Advised patient to avoid use of Q-tips, and to continue to use flonase

## 2019-02-07 ENCOUNTER — OFFICE VISIT (OUTPATIENT)
Dept: INTERNAL MEDICINE CLINIC | Facility: CLINIC | Age: 74
End: 2019-02-07
Payer: MEDICARE

## 2019-02-07 VITALS
DIASTOLIC BLOOD PRESSURE: 70 MMHG | HEART RATE: 89 BPM | SYSTOLIC BLOOD PRESSURE: 112 MMHG | OXYGEN SATURATION: 97 % | TEMPERATURE: 98.4 F | HEIGHT: 65 IN | WEIGHT: 174.4 LBS | BODY MASS INDEX: 29.06 KG/M2

## 2019-02-07 DIAGNOSIS — Z11.59 ENCOUNTER FOR HEPATITIS C SCREENING TEST FOR LOW RISK PATIENT: ICD-10-CM

## 2019-02-07 DIAGNOSIS — R53.83 OTHER FATIGUE: ICD-10-CM

## 2019-02-07 DIAGNOSIS — M94.9 DISORDER OF CARTILAGE: ICD-10-CM

## 2019-02-07 DIAGNOSIS — Z23 NEED FOR PNEUMOCOCCAL VACCINATION: ICD-10-CM

## 2019-02-07 DIAGNOSIS — E78.00 HYPERCHOLESTEROLEMIA: Primary | ICD-10-CM

## 2019-02-07 DIAGNOSIS — K21.9 GASTROESOPHAGEAL REFLUX DISEASE WITHOUT ESOPHAGITIS: ICD-10-CM

## 2019-02-07 DIAGNOSIS — Z13.820 SCREENING FOR OSTEOPOROSIS: ICD-10-CM

## 2019-02-07 DIAGNOSIS — F32.89 OTHER DEPRESSION: ICD-10-CM

## 2019-02-07 PROBLEM — J34.89 SINUS PRESSURE: Status: RESOLVED | Noted: 2019-01-07 | Resolved: 2019-02-07

## 2019-02-07 PROBLEM — H61.23 BILATERAL IMPACTED CERUMEN: Status: RESOLVED | Noted: 2019-01-11 | Resolved: 2019-02-07

## 2019-02-07 PROCEDURE — 99214 OFFICE O/P EST MOD 30 MIN: CPT | Performed by: FAMILY MEDICINE

## 2019-02-07 PROCEDURE — G0009 ADMIN PNEUMOCOCCAL VACCINE: HCPCS

## 2019-02-07 PROCEDURE — 90732 PPSV23 VACC 2 YRS+ SUBQ/IM: CPT

## 2019-02-07 NOTE — PROGRESS NOTES
Assessment/Plan:    No problem-specific Assessment & Plan notes found for this encounter  Problem List Items Addressed This Visit        Digestive    Gastroesophageal reflux disease without esophagitis    Relevant Orders    CBC and differential       Other    Hypercholesterolemia - Primary    Relevant Orders    Lipid panel    Comprehensive metabolic panel    Lipid panel    Comprehensive metabolic panel    Depression      Other Visit Diagnoses     Screening for osteoporosis        Relevant Orders    DXA bone density spine hip and pelvis    Disorder of cartilage         Relevant Orders    DXA bone density spine hip and pelvis    Vitamin D 25 hydroxy    Encounter for hepatitis C screening test for low risk patient        Relevant Orders    Hepatitis C antibody    Other fatigue        Relevant Orders    Vitamin D 25 hydroxy    Need for pneumococcal vaccination        Relevant Orders    PNEUMOCOCCAL POLYSACCHARIDE VACCINE 23-VALENT =>1YO SQ IM (Completed)                 discussion :  check labs now and again in 6 months, fasting  Schedule mammo and dexa scan  No changes to medications and doing very well  Call if any issues      Subjective:  F/up hld     Patient ID: Noemy Covarrubias is a 72 y o  female  HPI  Depression (Follow-Up): The patient states her depression has been stable since the last visit  She has no comorbid illnesses  Interval Events: doing well but  recently dx with lung cancer after brain surgery and colonoscopy  She has had no significant interval events  Interval Symptoms: improved depression,-improved depressed mood,-improved loss of interest or pleasure in activities,-stable insomnia,-denies feelings of worthlessness,-denies feelings of guilt,-denies trouble concentrating,-improved anxiety-and-denies sexual dysfunction  February 2019,  passed away in the winter 2018 but she has a very large support system and is doing relatively well    Taking it day by day  Associated symptoms include: no recent weight gain-and-no thoughts of suicide  Social Support: the patient has good social support  Medications: the patient is adherent with her medication regimen -She denies medication side effects  Pt has been on zoloft >5 years  States she feels really stable on it and does not feel the need to go off of it     Hyperlipidemia (Follow-Up): The patient states her hyperlipidemia has been under good control since the last visit  She has no comorbid illnesses  Symptoms: denies chest pain,-denies intermittent leg claudication,-denies muscle pain-and-denies muscle weakness  Associated symptoms include no focal neurologic deficits-and-no memory loss  Medications: the patient is adherent with her medication regimen -the patient complains of medication side effects  The patient is doing well with her hyperlipidemia goals  the patient's LDL goal is <100 mg/dL  -the patient's last LDL was 72 mg/dL  -(5/2014)  February 2019, due for labs and has lab slip    Gastroesophageal Reflux Disease (Follow-Up): The patient is being seen for follow-up of gastroesophageal reflux disease  Is essentially resolved symptoms  Not on anything on a regular basis      Review of Systems   Psychiatric/Behavioral: Positive for depression           Constitutional:  Denies fever or chills , no significant weight change  Eyes:  Denies change in visual acuity   HENT:  Denies nasal congestion or sore throat   Respiratory:  Denies cough or shortness of breath or wheezing  Cardiovascular:  Denies palpitations or chest pain  GI:  Denies abdominal pain, nausea, or vomiting  Integument:  Denies rash   Neurologic:  Denies headache or focal weakness, no dizziness        Objective:      /70 (BP Location: Right arm, Patient Position: Sitting, Cuff Size: Adult)   Pulse 89   Temp 98 4 °F (36 9 °C) (Oral)   Ht 5' 4 5" (1 638 m)   Wt 79 1 kg (174 lb 6 4 oz)   SpO2 97%   BMI 29 47 kg/m²          Physical Exam Constitutional:  Well developed, well nourished, no acute distress, non-toxic appearance   Eyes:  PERRL, conjunctiva normal , non icteric sclera  HENT:  Atraumatic, oropharynx moist  Neck-  supple   Respiratory:  CTA b/l, normal breath sounds, no rales, no wheezing   Cardiovascular:  RRR, no murmurs, no LE edema b/l  GI:  Soft, nondistended, normal bowel sounds x 4, nontender, no organomegaly, no mass, no rebound, no guarding   Neurologic:  no focal deficits noted   Psychiatric:  Speech and behavior appropriate , AAO x 3

## 2019-02-07 NOTE — PATIENT INSTRUCTIONS
Start vit B complex and OTC vit D  Increase water intake  Recommend massage and ok to take  glucosamine and chondroitin

## 2019-02-08 ENCOUNTER — APPOINTMENT (OUTPATIENT)
Dept: LAB | Age: 74
End: 2019-02-08
Payer: MEDICARE

## 2019-02-08 DIAGNOSIS — R53.83 OTHER FATIGUE: ICD-10-CM

## 2019-02-08 DIAGNOSIS — E78.00 HYPERCHOLESTEROLEMIA: ICD-10-CM

## 2019-02-08 DIAGNOSIS — Z11.59 ENCOUNTER FOR HEPATITIS C SCREENING TEST FOR LOW RISK PATIENT: ICD-10-CM

## 2019-02-08 DIAGNOSIS — K21.9 GASTROESOPHAGEAL REFLUX DISEASE WITHOUT ESOPHAGITIS: ICD-10-CM

## 2019-02-08 DIAGNOSIS — M94.9 DISORDER OF CARTILAGE: ICD-10-CM

## 2019-02-08 LAB
25(OH)D3 SERPL-MCNC: 35.6 NG/ML (ref 30–100)
ALBUMIN SERPL BCP-MCNC: 3.6 G/DL (ref 3.5–5)
ALP SERPL-CCNC: 57 U/L (ref 46–116)
ALT SERPL W P-5'-P-CCNC: 19 U/L (ref 12–78)
ANION GAP SERPL CALCULATED.3IONS-SCNC: 6 MMOL/L (ref 4–13)
AST SERPL W P-5'-P-CCNC: 14 U/L (ref 5–45)
BASOPHILS # BLD AUTO: 0.04 THOUSANDS/ΜL (ref 0–0.1)
BASOPHILS NFR BLD AUTO: 1 % (ref 0–1)
BILIRUB SERPL-MCNC: 0.47 MG/DL (ref 0.2–1)
BUN SERPL-MCNC: 16 MG/DL (ref 5–25)
CALCIUM SERPL-MCNC: 9.3 MG/DL (ref 8.3–10.1)
CHLORIDE SERPL-SCNC: 108 MMOL/L (ref 100–108)
CHOLEST SERPL-MCNC: 193 MG/DL (ref 50–200)
CO2 SERPL-SCNC: 26 MMOL/L (ref 21–32)
CREAT SERPL-MCNC: 0.61 MG/DL (ref 0.6–1.3)
EOSINOPHIL # BLD AUTO: 0.19 THOUSAND/ΜL (ref 0–0.61)
EOSINOPHIL NFR BLD AUTO: 2 % (ref 0–6)
ERYTHROCYTE [DISTWIDTH] IN BLOOD BY AUTOMATED COUNT: 12.6 % (ref 11.6–15.1)
GFR SERPL CREATININE-BSD FRML MDRD: 92 ML/MIN/1.73SQ M
GLUCOSE P FAST SERPL-MCNC: 98 MG/DL (ref 65–99)
HCT VFR BLD AUTO: 42.6 % (ref 34.8–46.1)
HCV AB SER QL: NORMAL
HDLC SERPL-MCNC: 64 MG/DL (ref 40–60)
HGB BLD-MCNC: 13.3 G/DL (ref 11.5–15.4)
IMM GRANULOCYTES # BLD AUTO: 0.03 THOUSAND/UL (ref 0–0.2)
IMM GRANULOCYTES NFR BLD AUTO: 0 % (ref 0–2)
LDLC SERPL CALC-MCNC: 102 MG/DL (ref 0–100)
LYMPHOCYTES # BLD AUTO: 1.78 THOUSANDS/ΜL (ref 0.6–4.47)
LYMPHOCYTES NFR BLD AUTO: 22 % (ref 14–44)
MCH RBC QN AUTO: 30.8 PG (ref 26.8–34.3)
MCHC RBC AUTO-ENTMCNC: 31.2 G/DL (ref 31.4–37.4)
MCV RBC AUTO: 99 FL (ref 82–98)
MONOCYTES # BLD AUTO: 0.46 THOUSAND/ΜL (ref 0.17–1.22)
MONOCYTES NFR BLD AUTO: 6 % (ref 4–12)
NEUTROPHILS # BLD AUTO: 5.75 THOUSANDS/ΜL (ref 1.85–7.62)
NEUTS SEG NFR BLD AUTO: 69 % (ref 43–75)
NONHDLC SERPL-MCNC: 129 MG/DL
NRBC BLD AUTO-RTO: 0 /100 WBCS
PLATELET # BLD AUTO: 205 THOUSANDS/UL (ref 149–390)
PMV BLD AUTO: 11 FL (ref 8.9–12.7)
POTASSIUM SERPL-SCNC: 4.3 MMOL/L (ref 3.5–5.3)
PROT SERPL-MCNC: 7 G/DL (ref 6.4–8.2)
RBC # BLD AUTO: 4.32 MILLION/UL (ref 3.81–5.12)
SODIUM SERPL-SCNC: 140 MMOL/L (ref 136–145)
TRIGL SERPL-MCNC: 136 MG/DL
WBC # BLD AUTO: 8.25 THOUSAND/UL (ref 4.31–10.16)

## 2019-02-08 PROCEDURE — 80053 COMPREHEN METABOLIC PANEL: CPT

## 2019-02-08 PROCEDURE — 86803 HEPATITIS C AB TEST: CPT

## 2019-02-08 PROCEDURE — 82306 VITAMIN D 25 HYDROXY: CPT

## 2019-02-08 PROCEDURE — 80061 LIPID PANEL: CPT

## 2019-02-08 PROCEDURE — 36415 COLL VENOUS BLD VENIPUNCTURE: CPT

## 2019-02-08 PROCEDURE — 85025 COMPLETE CBC W/AUTO DIFF WBC: CPT

## 2019-02-09 ENCOUNTER — TRANSCRIBE ORDERS (OUTPATIENT)
Dept: ADMINISTRATIVE | Facility: HOSPITAL | Age: 74
End: 2019-02-09

## 2019-02-09 DIAGNOSIS — Z12.39 SCREENING BREAST EXAMINATION: Primary | ICD-10-CM

## 2019-03-13 ENCOUNTER — HOSPITAL ENCOUNTER (OUTPATIENT)
Dept: RADIOLOGY | Age: 74
Discharge: HOME | End: 2019-03-13
Payer: MEDICARE

## 2019-03-13 VITALS — BODY MASS INDEX: 29.71 KG/M2 | HEIGHT: 64 IN | WEIGHT: 174 LBS

## 2019-03-13 DIAGNOSIS — M94.9 DISORDER OF CARTILAGE: ICD-10-CM

## 2019-03-13 DIAGNOSIS — Z13.820 SCREENING FOR OSTEOPOROSIS: ICD-10-CM

## 2019-03-13 DIAGNOSIS — Z12.39 SCREENING BREAST EXAMINATION: ICD-10-CM

## 2019-03-13 PROCEDURE — 77067 SCR MAMMO BI INCL CAD: CPT

## 2019-03-13 PROCEDURE — 77080 DXA BONE DENSITY AXIAL: CPT

## 2019-03-26 ENCOUNTER — OFFICE VISIT (OUTPATIENT)
Dept: INTERNAL MEDICINE CLINIC | Facility: CLINIC | Age: 74
End: 2019-03-26
Payer: MEDICARE

## 2019-03-26 VITALS
HEART RATE: 88 BPM | TEMPERATURE: 98.7 F | BODY MASS INDEX: 30.52 KG/M2 | DIASTOLIC BLOOD PRESSURE: 80 MMHG | WEIGHT: 178.8 LBS | SYSTOLIC BLOOD PRESSURE: 114 MMHG | OXYGEN SATURATION: 98 % | HEIGHT: 64 IN

## 2019-03-26 DIAGNOSIS — M25.562 ACUTE PAIN OF LEFT KNEE: ICD-10-CM

## 2019-03-26 DIAGNOSIS — Z12.11 SCREENING FOR COLON CANCER: Primary | ICD-10-CM

## 2019-03-26 PROCEDURE — 99213 OFFICE O/P EST LOW 20 MIN: CPT | Performed by: INTERNAL MEDICINE

## 2019-03-26 PROCEDURE — 20610 DRAIN/INJ JOINT/BURSA W/O US: CPT | Performed by: INTERNAL MEDICINE

## 2019-03-26 RX ORDER — BUPIVACAINE HYDROCHLORIDE 5 MG/ML
2 INJECTION, SOLUTION EPIDURAL; INTRACAUDAL
Status: COMPLETED | OUTPATIENT
Start: 2019-03-26 | End: 2019-03-26

## 2019-03-26 RX ORDER — METHYLPREDNISOLONE ACETATE 40 MG/ML
1 INJECTION, SUSPENSION INTRA-ARTICULAR; INTRALESIONAL; INTRAMUSCULAR; SOFT TISSUE
Status: COMPLETED | OUTPATIENT
Start: 2019-03-26 | End: 2019-03-26

## 2019-03-26 RX ORDER — LIDOCAINE HYDROCHLORIDE 10 MG/ML
2 INJECTION, SOLUTION INFILTRATION; PERINEURAL
Status: COMPLETED | OUTPATIENT
Start: 2019-03-26 | End: 2019-03-26

## 2019-03-26 RX ADMIN — LIDOCAINE HYDROCHLORIDE 2 ML: 10 INJECTION, SOLUTION INFILTRATION; PERINEURAL at 10:33

## 2019-03-26 RX ADMIN — METHYLPREDNISOLONE ACETATE 1 ML: 40 INJECTION, SUSPENSION INTRA-ARTICULAR; INTRALESIONAL; INTRAMUSCULAR; SOFT TISSUE at 10:33

## 2019-03-26 RX ADMIN — BUPIVACAINE HYDROCHLORIDE 2 ML: 5 INJECTION, SOLUTION EPIDURAL; INTRACAUDAL at 10:33

## 2019-03-26 NOTE — PROGRESS NOTES
Assessment/Plan:    Left knee pain  depomedrol-xylocaine-bupivacaine injected to the left knee, tolerated procedure well  She will return after her vacation to schedule an appointment with orthopedics  Diagnoses and all orders for this visit:    Screening for colon cancer    Acute pain of left knee  -     Large joint arthrocentesis: L knee    Other orders  -     Cancel: Ambulatory referral to Gastroenterology; Future          Subjective:      Patient ID: Christel Martin is a 72 y o  female  72year old female is seen today with acute pain and swelling of the left knee  She fell approximately 2 weeks ago  She has a h/o left meniscal tear s/p repair (approximately 2011)  She has been taking ibuprofen for pain with mild relief of symptoms  The following portions of the patient's history were reviewed and updated as appropriate: allergies, current medications, past family history, past medical history, past social history, past surgical history and problem list     Review of Systems   Constitutional: Negative for activity change, appetite change, chills, diaphoresis, fatigue and fever  HENT: Negative for congestion, postnasal drip, rhinorrhea, sinus pressure, sinus pain, sneezing and sore throat  Eyes: Negative for visual disturbance  Respiratory: Negative for apnea, cough, choking, chest tightness, shortness of breath and wheezing  Cardiovascular: Negative for chest pain, palpitations and leg swelling  Gastrointestinal: Negative for abdominal distention, abdominal pain, anal bleeding, blood in stool, constipation, diarrhea, nausea and vomiting  Endocrine: Negative for cold intolerance and heat intolerance  Genitourinary: Negative for difficulty urinating, dysuria and hematuria  Musculoskeletal: Positive for arthralgias (left knee pain and swelling)  Skin: Negative  Neurological: Negative for dizziness, weakness, light-headedness, numbness and headaches     Hematological: Negative for adenopathy  Psychiatric/Behavioral: Negative for agitation, sleep disturbance and suicidal ideas  All other systems reviewed and are negative          Past Medical History:   Diagnosis Date    Hyperlipidemia     Skin tag     last assessed 4/21/17, resolved 10/2/17          Current Outpatient Medications:     ALPRAZolam (XANAX) 0 5 mg tablet, Take 1 tablet (0 5 mg total) by mouth daily, Disp: 30 tablet, Rfl: 0    Ibuprofen-Diphenhydramine Cit 200-38 MG TABS, Take by mouth as needed, Disp: , Rfl:     sertraline (ZOLOFT) 100 mg tablet, Take 1 tablet (100 mg total) by mouth daily, Disp: 90 tablet, Rfl: 1    simvastatin (ZOCOR) 20 mg tablet, Take 1 tablet (20 mg total) by mouth daily at bedtime, Disp: 90 tablet, Rfl: 1    fluticasone (FLONASE) 50 mcg/act nasal spray, 1 spray into each nostril daily (Patient not taking: Reported on 3/26/2019), Disp: 1 Bottle, Rfl: 0    Allergies   Allergen Reactions    Tetracyclines & Related      blisters       Social History   Past Surgical History:   Procedure Laterality Date    COLONOSCOPY      Complete, with Dr Meyers, resolved 7/11/13    KNEE ARTHROSCOPY Left     resolved 2003    KNEE ARTHROSCOPY W/ MENISCAL REPAIR Left 2000    TONSILLECTOMY AND ADENOIDECTOMY       Family History   Problem Relation Age of Onset    Stroke Mother         CVA    Hyperlipidemia Mother     Stroke Maternal Grandfather         CVA    Stroke Family         CVA    Other Family         breast disorders    Prostate cancer Father 76    Other Maternal Aunt         breast disorders        Objective:  /80 (BP Location: Left arm, Patient Position: Sitting, Cuff Size: Standard)   Pulse 88   Temp 98 7 °F (37 1 °C) (Oral)   Ht 5' 4" (1 626 m)   Wt 81 1 kg (178 lb 12 8 oz)   SpO2 98%   BMI 30 69 kg/m²     Recent Results (from the past 1344 hour(s))   Lipid panel    Collection Time: 02/08/19  7:30 AM   Result Value Ref Range    Cholesterol 193 50 - 200 mg/dL Triglycerides 136 <=150 mg/dL    HDL, Direct 64 (H) 40 - 60 mg/dL    LDL Calculated 102 (H) 0 - 100 mg/dL    Non-HDL-Chol (CHOL-HDL) 129 mg/dl   Comprehensive metabolic panel    Collection Time: 02/08/19  7:30 AM   Result Value Ref Range    Sodium 140 136 - 145 mmol/L    Potassium 4 3 3 5 - 5 3 mmol/L    Chloride 108 100 - 108 mmol/L    CO2 26 21 - 32 mmol/L    ANION GAP 6 4 - 13 mmol/L    BUN 16 5 - 25 mg/dL    Creatinine 0 61 0 60 - 1 30 mg/dL    Glucose, Fasting 98 65 - 99 mg/dL    Calcium 9 3 8 3 - 10 1 mg/dL    AST 14 5 - 45 U/L    ALT 19 12 - 78 U/L    Alkaline Phosphatase 57 46 - 116 U/L    Total Protein 7 0 6 4 - 8 2 g/dL    Albumin 3 6 3 5 - 5 0 g/dL    Total Bilirubin 0 47 0 20 - 1 00 mg/dL    eGFR 92 ml/min/1 73sq m   Hepatitis C antibody    Collection Time: 02/08/19  7:30 AM   Result Value Ref Range    Hepatitis C Ab Non-reactive Non-reactive   Vitamin D 25 hydroxy    Collection Time: 02/08/19  7:30 AM   Result Value Ref Range    Vit D, 25-Hydroxy 35 6 30 0 - 100 0 ng/mL   CBC and differential    Collection Time: 02/08/19  7:30 AM   Result Value Ref Range    WBC 8 25 4 31 - 10 16 Thousand/uL    RBC 4 32 3 81 - 5 12 Million/uL    Hemoglobin 13 3 11 5 - 15 4 g/dL    Hematocrit 42 6 34 8 - 46 1 %    MCV 99 (H) 82 - 98 fL    MCH 30 8 26 8 - 34 3 pg    MCHC 31 2 (L) 31 4 - 37 4 g/dL    RDW 12 6 11 6 - 15 1 %    MPV 11 0 8 9 - 12 7 fL    Platelets 012 805 - 587 Thousands/uL    nRBC 0 /100 WBCs    Neutrophils Relative 69 43 - 75 %    Immat GRANS % 0 0 - 2 %    Lymphocytes Relative 22 14 - 44 %    Monocytes Relative 6 4 - 12 %    Eosinophils Relative 2 0 - 6 %    Basophils Relative 1 0 - 1 %    Neutrophils Absolute 5 75 1 85 - 7 62 Thousands/µL    Immature Grans Absolute 0 03 0 00 - 0 20 Thousand/uL    Lymphocytes Absolute 1 78 0 60 - 4 47 Thousands/µL    Monocytes Absolute 0 46 0 17 - 1 22 Thousand/µL    Eosinophils Absolute 0 19 0 00 - 0 61 Thousand/µL    Basophils Absolute 0 04 0 00 - 0 10 Thousands/µL Physical Exam   Constitutional: She is oriented to person, place, and time  She appears well-developed and well-nourished  No distress  HENT:   Head: Normocephalic and atraumatic  Eyes: Pupils are equal, round, and reactive to light  Conjunctivae and EOM are normal  Right eye exhibits no discharge  Left eye exhibits no discharge  Neck: Normal range of motion  Neck supple  No JVD present  No thyromegaly present  Cardiovascular: Normal rate, regular rhythm, normal heart sounds and intact distal pulses  Exam reveals no gallop and no friction rub  No murmur heard  Pulmonary/Chest: Effort normal and breath sounds normal  No respiratory distress  She has no wheezes  She has no rales  She exhibits no tenderness  Abdominal: Soft  She exhibits no distension  There is no tenderness  Musculoskeletal: Normal range of motion  She exhibits no edema, tenderness or deformity  Lymphadenopathy:     She has no cervical adenopathy  Neurological: She is alert and oriented to person, place, and time  No cranial nerve deficit  Coordination normal    Skin: Skin is warm and dry  No rash noted  She is not diaphoretic  No erythema  No pallor  Psychiatric: She has a normal mood and affect  Her behavior is normal  Judgment and thought content normal    Nursing note and vitals reviewed        Large joint arthrocentesis: L knee  Date/Time: 3/26/2019 10:33 AM  Consent given by: patient  Site marked: site marked  Timeout: Immediately prior to procedure a time out was called to verify the correct patient, procedure, equipment, support staff and site/side marked as required   Supporting Documentation  Indications: pain and joint swelling   Procedure Details  Location: knee - L knee  Needle size: 25 G  Ultrasound guidance: no  Approach: anteromedial  Medications administered: 2 mL bupivacaine (PF) 0 5 %; 2 mL lidocaine 1 %; 1 mL methylPREDNISolone acetate 40 mg/mL    Aspirate amount: 0 mL    Patient tolerance: patient tolerated the procedure well with no immediate complications  Dressing:  Sterile dressing applied

## 2019-03-26 NOTE — ASSESSMENT & PLAN NOTE
depomedrol-xylocaine-bupivacaine injected to the left knee, tolerated procedure well  She will return after her vacation to schedule an appointment with orthopedics

## 2019-05-02 ENCOUNTER — TELEPHONE (OUTPATIENT)
Dept: INTERNAL MEDICINE CLINIC | Age: 74
End: 2019-05-02

## 2019-05-31 ENCOUNTER — APPOINTMENT (OUTPATIENT)
Dept: RADIOLOGY | Facility: MEDICAL CENTER | Age: 74
End: 2019-05-31
Payer: MEDICARE

## 2019-05-31 ENCOUNTER — OFFICE VISIT (OUTPATIENT)
Dept: OBGYN CLINIC | Facility: MEDICAL CENTER | Age: 74
End: 2019-05-31
Payer: MEDICARE

## 2019-05-31 VITALS
RESPIRATION RATE: 18 BRPM | BODY MASS INDEX: 30.05 KG/M2 | HEART RATE: 64 BPM | SYSTOLIC BLOOD PRESSURE: 94 MMHG | DIASTOLIC BLOOD PRESSURE: 60 MMHG | WEIGHT: 176 LBS | HEIGHT: 64 IN

## 2019-05-31 DIAGNOSIS — M25.562 LEFT KNEE PAIN, UNSPECIFIED CHRONICITY: Primary | ICD-10-CM

## 2019-05-31 DIAGNOSIS — M25.562 LEFT KNEE PAIN, UNSPECIFIED CHRONICITY: ICD-10-CM

## 2019-05-31 DIAGNOSIS — Z98.890 HISTORY OF ARTHROSCOPY OF LEFT KNEE: ICD-10-CM

## 2019-05-31 DIAGNOSIS — M17.12 ARTHRITIS OF LEFT KNEE: ICD-10-CM

## 2019-05-31 PROCEDURE — 20610 DRAIN/INJ JOINT/BURSA W/O US: CPT | Performed by: ORTHOPAEDIC SURGERY

## 2019-05-31 PROCEDURE — 73560 X-RAY EXAM OF KNEE 1 OR 2: CPT

## 2019-05-31 PROCEDURE — 99203 OFFICE O/P NEW LOW 30 MIN: CPT | Performed by: ORTHOPAEDIC SURGERY

## 2019-05-31 RX ORDER — LIDOCAINE HYDROCHLORIDE 10 MG/ML
2 INJECTION, SOLUTION INFILTRATION; PERINEURAL
Status: COMPLETED | OUTPATIENT
Start: 2019-05-31 | End: 2019-05-31

## 2019-05-31 RX ORDER — BUPIVACAINE HYDROCHLORIDE 2.5 MG/ML
2 INJECTION, SOLUTION INFILTRATION; PERINEURAL
Status: COMPLETED | OUTPATIENT
Start: 2019-05-31 | End: 2019-05-31

## 2019-05-31 RX ORDER — BETAMETHASONE SODIUM PHOSPHATE AND BETAMETHASONE ACETATE 3; 3 MG/ML; MG/ML
12 INJECTION, SUSPENSION INTRA-ARTICULAR; INTRALESIONAL; INTRAMUSCULAR; SOFT TISSUE
Status: COMPLETED | OUTPATIENT
Start: 2019-05-31 | End: 2019-05-31

## 2019-05-31 RX ADMIN — BETAMETHASONE SODIUM PHOSPHATE AND BETAMETHASONE ACETATE 12 MG: 3; 3 INJECTION, SUSPENSION INTRA-ARTICULAR; INTRALESIONAL; INTRAMUSCULAR; SOFT TISSUE at 15:05

## 2019-05-31 RX ADMIN — BUPIVACAINE HYDROCHLORIDE 2 ML: 2.5 INJECTION, SOLUTION INFILTRATION; PERINEURAL at 15:05

## 2019-05-31 RX ADMIN — LIDOCAINE HYDROCHLORIDE 2 ML: 10 INJECTION, SOLUTION INFILTRATION; PERINEURAL at 15:05

## 2019-06-06 ENCOUNTER — TELEPHONE (OUTPATIENT)
Dept: OBGYN CLINIC | Facility: OTHER | Age: 74
End: 2019-06-06

## 2019-07-05 ENCOUNTER — OFFICE VISIT (OUTPATIENT)
Dept: OBGYN CLINIC | Facility: MEDICAL CENTER | Age: 74
End: 2019-07-05
Payer: MEDICARE

## 2019-07-05 VITALS
HEART RATE: 84 BPM | DIASTOLIC BLOOD PRESSURE: 76 MMHG | WEIGHT: 176 LBS | SYSTOLIC BLOOD PRESSURE: 113 MMHG | BODY MASS INDEX: 30.05 KG/M2 | HEIGHT: 64 IN

## 2019-07-05 DIAGNOSIS — M17.12 PRIMARY OSTEOARTHRITIS OF LEFT KNEE: Primary | ICD-10-CM

## 2019-07-05 PROCEDURE — 20610 DRAIN/INJ JOINT/BURSA W/O US: CPT | Performed by: ORTHOPAEDIC SURGERY

## 2019-07-05 PROCEDURE — 99213 OFFICE O/P EST LOW 20 MIN: CPT | Performed by: ORTHOPAEDIC SURGERY

## 2019-07-05 NOTE — PROGRESS NOTES
72 y o female presents to the office for follow up of left knee osteoarthritis  She continues to experience medial left knee pain made worse with weightbearing activities  She has not received relief from previous cortisone injection  She notes a small tender lump over previous injection site anteromedially  She is here today for her first Orthovisc injection of a three shot series for her left knee  Review of Systems  Review of systems negative unless otherwise specified in HPI    Past Medical History  Past Medical History:   Diagnosis Date    Hyperlipidemia     Skin tag     last assessed 4/21/17, resolved 10/2/17        Past Surgical History  Past Surgical History:   Procedure Laterality Date    COLONOSCOPY      Complete, with Dr Meyers, resolved 7/11/13    KNEE ARTHROSCOPY Left     resolved 2003    KNEE ARTHROSCOPY W/ MENISCAL REPAIR Left 2000    TONSILLECTOMY AND ADENOIDECTOMY         Current Medications  Current Outpatient Medications on File Prior to Visit   Medication Sig Dispense Refill    ALPRAZolam (XANAX) 0 5 mg tablet Take 1 tablet (0 5 mg total) by mouth daily 30 tablet 0    B Complex Vitamins (B COMPLEX PO) Take by mouth      CALCIUM PO Take by mouth      Cholecalciferol (VITAMIN D3 PO) Take by mouth      Ibuprofen-Diphenhydramine Cit 200-38 MG TABS Take by mouth as needed      sertraline (ZOLOFT) 100 mg tablet Take 1 tablet (100 mg total) by mouth daily 90 tablet 1    simvastatin (ZOCOR) 20 mg tablet Take 1 tablet (20 mg total) by mouth daily at bedtime 90 tablet 1    [DISCONTINUED] VITAMIN E PO Take by mouth       No current facility-administered medications on file prior to visit          Recent Labs [facility])  0   Lab Value Date/Time    HCT 42 6 02/08/2019 0730    HCT 40 9 05/04/2015 1403    HGB 13 3 02/08/2019 0730    HGB 13 4 05/04/2015 1403    WBC 8 25 02/08/2019 0730    WBC 5 96 05/04/2015 1403    GLUCOSE 96 05/04/2015 1403         Physical exam  · General: Awake, Alert, Oriented  · Eyes: Pupils equal, round and reactive to light  · Heart: regular rate and rhythm  · Lungs: No audible wheezing  · Abdomen: soft  Left knee  · Patient ambulates without assistance  · Varus alignment  · Skin intact  · Extension 0°, flexion full  · Tender palpation over medial joint line  · No knee effusion  · Knee stable in sagittal and coronal planes  · 5 out of 5 quad and hamstring strength  · Sensation intact L1-S1  Procedure  Large joint arthrocentesis: L knee  Date/Time: 7/5/2019 1:26 PM  Consent given by: patient  Site marked: site marked  Timeout: Immediately prior to procedure a time out was called to verify the correct patient, procedure, equipment, support staff and site/side marked as required   Supporting Documentation  Indications: pain   Procedure Details  Location: knee - L knee  Preparation: Patient was prepped and draped in the usual sterile fashion  Needle size: 22 G  Ultrasound guidance: no  Approach: anterolateral  Medications administered: 30 mg sodium hyaluronate 30 mg/2 mL    Patient tolerance: patient tolerated the procedure well with no immediate complications  Dressing:  Sterile dressing applied          Assessment/Plan:   72 y  o female with left knee osteoarthritis received her first injection of a three shot series today in the office  Post-injection protocol was reviewed  We will see her back in 1 week for the second injection  If this series is not beneficial for her she will consider proceeding with left total knee arthroplasty around Daniel Suarezibe Attestation    I,:   Joshua Schwartz am acting as a scribe while in the presence of the attending physician :        I,:   Tirso Castillo MD personally performed the services described in this documentation    as scribed in my presence :

## 2019-07-08 ENCOUNTER — TRANSCRIBE ORDERS (OUTPATIENT)
Dept: LAB | Age: 74
End: 2019-07-08

## 2019-07-09 ENCOUNTER — TELEPHONE (OUTPATIENT)
Dept: INTERNAL MEDICINE CLINIC | Age: 74
End: 2019-07-09

## 2019-07-09 DIAGNOSIS — E78.00 HYPERCHOLESTEROLEMIA: Primary | ICD-10-CM

## 2019-07-09 NOTE — TELEPHONE ENCOUNTER
Called patient and relayed that orders are in Epic  She again apologized for missing her appointment and was very appreciative of the quick reply

## 2019-07-09 NOTE — TELEPHONE ENCOUNTER
Patient called right before her scheduled appointment and said something came up and she could not make it  She rescheduled for 08/16/19  Patient is asking if she needs to have any blood work done prior to the appointment

## 2019-07-12 ENCOUNTER — OFFICE VISIT (OUTPATIENT)
Dept: OBGYN CLINIC | Facility: MEDICAL CENTER | Age: 74
End: 2019-07-12
Payer: MEDICARE

## 2019-07-12 VITALS
RESPIRATION RATE: 18 BRPM | HEIGHT: 64 IN | BODY MASS INDEX: 30.77 KG/M2 | DIASTOLIC BLOOD PRESSURE: 84 MMHG | HEART RATE: 82 BPM | WEIGHT: 180.2 LBS | SYSTOLIC BLOOD PRESSURE: 127 MMHG

## 2019-07-12 DIAGNOSIS — G89.29 CHRONIC PAIN OF LEFT KNEE: ICD-10-CM

## 2019-07-12 DIAGNOSIS — M25.562 CHRONIC PAIN OF LEFT KNEE: ICD-10-CM

## 2019-07-12 DIAGNOSIS — M17.12 PRIMARY OSTEOARTHRITIS OF LEFT KNEE: Primary | ICD-10-CM

## 2019-07-12 PROCEDURE — 20610 DRAIN/INJ JOINT/BURSA W/O US: CPT | Performed by: ORTHOPAEDIC SURGERY

## 2019-07-12 NOTE — PROGRESS NOTES
Assessment:  1  Primary osteoarthritis of left knee  Large joint arthrocentesis: L knee   2  Chronic pain of left knee  Large joint arthrocentesis: L knee       Plan:  Left knee known osteoarthritis who presents today for continuation of the Orthovisc lubricating injection series  Her left knee was injected with Orthovisc #2  She tolerated procedure well  Ice and post injection protocol advised  Weightbearing activities as tolerated  Patient will follow up next week to complete the viscosupplementation injection series  To do next visit:  Return in about 1 week (around 7/19/2019) for re-check and orthovisc #3 left knee  The above stated was discussed in layman's terms and the patient expressed understanding  All questions were answered to the patient's satisfaction  Scribe Attestation    I,:   Benjamin Valencia am acting as a scribe while in the presence of the attending physician :        I,:   Tam Calloway MD personally performed the services described in this documentation    as scribed in my presence :              Subjective:   Jaren Dexter is a 72 y o  female who presents repeat evaluation of her left knee, known osteoarthritis, and continuation of the Orthovisc lubricating injection series  She has a history of a left knee arthroscopy  She also had previous cortisone injections with minimal lasting relief  After last week's injection she did have some nighttime pain which resolved the next day  Denies any fevers or chills        Review of systems negative unless otherwise specified in HPI    Past Medical History:   Diagnosis Date    Hyperlipidemia     Skin tag     last assessed 4/21/17, resolved 10/2/17        Past Surgical History:   Procedure Laterality Date    COLONOSCOPY      Complete, with Dr Meyers, resolved 7/11/13    KNEE ARTHROSCOPY Left     resolved 2003    KNEE ARTHROSCOPY W/ MENISCAL REPAIR Left 2000    TONSILLECTOMY AND ADENOIDECTOMY         Family History   Problem Relation Age of Onset    Stroke Mother         CVA    Hyperlipidemia Mother     Stroke Maternal Grandfather         CVA    Stroke Family         CVA    Other Family         breast disorders    Prostate cancer Father 76    Other Maternal Aunt         breast disorders        Social History     Occupational History    Occupation: Caterer   Tobacco Use    Smoking status: Former Smoker     Last attempt to quit: 1980     Years since quittin 5    Smokeless tobacco: Never Used   Substance and Sexual Activity    Alcohol use: Yes     Frequency: 2-4 times a month     Drinks per session: 1 or 2     Comment: Social     Drug use: No    Sexual activity: Not on file         Current Outpatient Medications:     ALPRAZolam (XANAX) 0 5 mg tablet, Take 1 tablet (0 5 mg total) by mouth daily (Patient taking differently: Take 0 5 mg by mouth daily as needed ), Disp: 30 tablet, Rfl: 0    B Complex Vitamins (B COMPLEX PO), Take by mouth, Disp: , Rfl:     CALCIUM PO, Take by mouth, Disp: , Rfl:     Cholecalciferol (VITAMIN D3 PO), Take by mouth, Disp: , Rfl:     Ibuprofen-Diphenhydramine Cit 200-38 MG TABS, Take by mouth as needed, Disp: , Rfl:     sertraline (ZOLOFT) 100 mg tablet, Take 1 tablet (100 mg total) by mouth daily, Disp: 90 tablet, Rfl: 1    simvastatin (ZOCOR) 20 mg tablet, Take 1 tablet (20 mg total) by mouth daily at bedtime, Disp: 90 tablet, Rfl: 1    Allergies   Allergen Reactions    Tetracyclines & Related      blisters            Vitals:    19 1024   BP: 127/84   Pulse: 82   Resp: 18       Objective:                    Left Knee Exam     Muscle Strength   The patient has normal left knee strength  Range of Motion   The patient has normal left knee ROM  Left knee flexion: with crepitus       Other   Erythema: absent  Sensation: normal  Swelling: mild  Effusion: no effusion present    Comments:    Varus alignment  Calf and thigh are soft and non-tender  No signs of DVT or infection              Diagnostics, reviewed and taken today if performed as documented:    None performed          Procedures, if performed today:    Large joint arthrocentesis: L knee  Date/Time: 7/12/2019 10:33 AM  Consent given by: patient  Site marked: site marked  Timeout: Immediately prior to procedure a time out was called to verify the correct patient, procedure, equipment, support staff and site/side marked as required   Supporting Documentation  Indications: pain and diagnostic evaluation   Procedure Details  Location: knee - L knee  Preparation: Patient was prepped and draped in the usual sterile fashion  Needle size: 22 G  Ultrasound guidance: no  Approach: anteromedial  Medications administered: 30 mg sodium hyaluronate 30 mg/2 mL    Patient tolerance: patient tolerated the procedure well with no immediate complications  Dressing:  Sterile dressing applied             Portions of the record may have been created with voice recognition software   Occasional wrong word or "sound a like" substitutions may have occurred due to the inherent limitations of voice recognition software   Read the chart carefully and recognize, using context, where substitutions have occurred

## 2019-07-19 ENCOUNTER — OFFICE VISIT (OUTPATIENT)
Dept: OBGYN CLINIC | Facility: MEDICAL CENTER | Age: 74
End: 2019-07-19
Payer: MEDICARE

## 2019-07-19 VITALS
HEIGHT: 64 IN | WEIGHT: 180 LBS | HEART RATE: 99 BPM | BODY MASS INDEX: 30.73 KG/M2 | DIASTOLIC BLOOD PRESSURE: 81 MMHG | SYSTOLIC BLOOD PRESSURE: 129 MMHG

## 2019-07-19 DIAGNOSIS — M70.62 TROCHANTERIC BURSITIS, LEFT HIP: ICD-10-CM

## 2019-07-19 DIAGNOSIS — M17.12 PRIMARY OSTEOARTHRITIS OF LEFT KNEE: Primary | ICD-10-CM

## 2019-07-19 DIAGNOSIS — G89.29 CHRONIC PAIN OF LEFT KNEE: ICD-10-CM

## 2019-07-19 DIAGNOSIS — M25.562 CHRONIC PAIN OF LEFT KNEE: ICD-10-CM

## 2019-07-19 PROCEDURE — 20610 DRAIN/INJ JOINT/BURSA W/O US: CPT | Performed by: ORTHOPAEDIC SURGERY

## 2019-07-19 RX ORDER — LIDOCAINE HYDROCHLORIDE 10 MG/ML
2 INJECTION, SOLUTION INFILTRATION; PERINEURAL
Status: COMPLETED | OUTPATIENT
Start: 2019-07-19 | End: 2019-07-19

## 2019-07-19 RX ORDER — BETAMETHASONE SODIUM PHOSPHATE AND BETAMETHASONE ACETATE 3; 3 MG/ML; MG/ML
12 INJECTION, SUSPENSION INTRA-ARTICULAR; INTRALESIONAL; INTRAMUSCULAR; SOFT TISSUE
Status: COMPLETED | OUTPATIENT
Start: 2019-07-19 | End: 2019-07-19

## 2019-07-19 RX ADMIN — BETAMETHASONE SODIUM PHOSPHATE AND BETAMETHASONE ACETATE 12 MG: 3; 3 INJECTION, SUSPENSION INTRA-ARTICULAR; INTRALESIONAL; INTRAMUSCULAR; SOFT TISSUE at 09:35

## 2019-07-19 RX ADMIN — LIDOCAINE HYDROCHLORIDE 2 ML: 10 INJECTION, SOLUTION INFILTRATION; PERINEURAL at 09:35

## 2019-07-19 NOTE — PROGRESS NOTES
Assessment:  1  Primary osteoarthritis of left knee     2  Chronic pain of left knee     3  Trochanteric bursitis, left hip         Plan:  The patient was provided with 3rd left Orthovisc injection  She is provided with left trochanteric bursa injection  She should follow up in 3 months  To do next visit:  Return in about 3 months (around 10/19/2019)  The above stated was discussed in layman's terms and the patient expressed understanding  All questions were answered to the patient's satisfaction  Scribe Attestation    I,:   Clarita Nageotte am acting as a scribe while in the presence of the attending physician :        I,:   Steve Ayala MD personally performed the services described in this documentation    as scribed in my presence :              Subjective:   Lore Botello is a 72 y o  female who presents for 3rd left knee Orthovisc injection  She feels the same  Today she complains of generalized left knee pain and increasing left lateral hip pain  She rates the hip pain at 7/10  She feels her hip is getting worse due to the left knee pain  She anticipates left knee replacement near 12/2019          Review of systems negative unless otherwise specified in HPI    Past Medical History:   Diagnosis Date    Hyperlipidemia     Skin tag     last assessed 4/21/17, resolved 10/2/17        Past Surgical History:   Procedure Laterality Date    COLONOSCOPY      Complete, with Dr Meyers, resolved 7/11/13    KNEE ARTHROSCOPY Left     resolved 2003    KNEE ARTHROSCOPY W/ MENISCAL REPAIR Left 2000    TONSILLECTOMY AND ADENOIDECTOMY         Family History   Problem Relation Age of Onset    Stroke Mother         CVA    Hyperlipidemia Mother     Stroke Maternal Grandfather         CVA    Stroke Family         CVA    Other Family         breast disorders    Prostate cancer Father 76    Other Maternal Aunt         breast disorders        Social History     Occupational History    Occupation: Caterer   Tobacco Use    Smoking status: Former Smoker     Last attempt to quit: 1980     Years since quittin 5    Smokeless tobacco: Never Used   Substance and Sexual Activity    Alcohol use: Yes     Frequency: 2-4 times a month     Drinks per session: 1 or 2     Comment: Social     Drug use: No    Sexual activity: Not on file         Current Outpatient Medications:     ALPRAZolam (XANAX) 0 5 mg tablet, Take 1 tablet (0 5 mg total) by mouth daily (Patient taking differently: Take 0 5 mg by mouth daily as needed ), Disp: 30 tablet, Rfl: 0    B Complex Vitamins (B COMPLEX PO), Take by mouth, Disp: , Rfl:     CALCIUM PO, Take by mouth, Disp: , Rfl:     Cholecalciferol (VITAMIN D3 PO), Take by mouth, Disp: , Rfl:     Ibuprofen-Diphenhydramine Cit 200-38 MG TABS, Take by mouth as needed, Disp: , Rfl:     sertraline (ZOLOFT) 100 mg tablet, Take 1 tablet (100 mg total) by mouth daily, Disp: 90 tablet, Rfl: 1    simvastatin (ZOCOR) 20 mg tablet, Take 1 tablet (20 mg total) by mouth daily at bedtime, Disp: 90 tablet, Rfl: 1    Allergies   Allergen Reactions    Tetracyclines & Related      blisters            Vitals:    19 0919   BP: 129/81   Pulse: 99       Objective:  Physical exam  · General: Awake, Alert, Oriented  · Eyes: Pupils equal, round and reactive to light  · Heart: regular rate and rhythm  · Lungs: No audible wheezing  · Abdomen: soft                    Ortho Exam   Left knee:  Varus alignment   No erythema or ecchymosis  No effusion or swelling  Normal strength  Good ROM with crepitus  Calf compartments soft and supple  Sensation intact  Toes are warm sensate and mobile      Left hip:  Tender trochanteric bursa    Diagnostics, reviewed and taken today if performed as documented:    None performed     Procedures, if performed today:    Large joint arthrocentesis: L knee  Date/Time: 2019 9:32 AM  Consent given by: patient  Site marked: site marked  Timeout: Immediately prior to procedure a time out was called to verify the correct patient, procedure, equipment, support staff and site/side marked as required   Supporting Documentation  Indications: pain   Procedure Details  Location: knee - L knee  Preparation: Patient was prepped and draped in the usual sterile fashion  Needle size: 22 G  Ultrasound guidance: no  Approach: anterolateral  Medications administered: 30 mg sodium hyaluronate 30 mg/2 mL  Specialty Pharmacy Supplied: received medications from pharmacy  Patient tolerance: patient tolerated the procedure well with no immediate complications  Dressing:  Sterile dressing applied    Large joint arthrocentesis: L greater trochanteric bursa  Date/Time: 7/19/2019 9:35 AM  Consent given by: patient  Site marked: site marked  Supporting Documentation  Indications: pain   Procedure Details  Location: hip - L greater trochanteric bursa  Needle size: 22 G  Ultrasound guidance: no  Approach: lateral  Medications administered: 12 mg betamethasone acetate-betamethasone sodium phosphate 6 (3-3) mg/mL; 2 mL lidocaine 1 % (2ml bupivacain 0 5%)    Patient tolerance: patient tolerated the procedure well with no immediate complications  Dressing:  Sterile dressing applied          Portions of the record may have been created with voice recognition software  Occasional wrong word or "sound a like" substitutions may have occurred due to the inherent limitations of voice recognition software  Read the chart carefully and recognize, using context, where substitutions have occurred

## 2019-07-30 DIAGNOSIS — F41.9 ANXIETY: ICD-10-CM

## 2019-07-30 DIAGNOSIS — E78.5 HYPERLIPIDEMIA, UNSPECIFIED HYPERLIPIDEMIA TYPE: ICD-10-CM

## 2019-07-30 RX ORDER — SIMVASTATIN 20 MG
20 TABLET ORAL
Qty: 30 TABLET | Refills: 0 | Status: SHIPPED | OUTPATIENT
Start: 2019-07-30 | End: 2019-08-16 | Stop reason: SDUPTHER

## 2019-07-30 RX ORDER — SIMVASTATIN 20 MG
TABLET ORAL
Qty: 90 TABLET | Refills: 1 | OUTPATIENT
Start: 2019-07-30

## 2019-07-30 RX ORDER — SERTRALINE HYDROCHLORIDE 100 MG/1
TABLET, FILM COATED ORAL
Qty: 90 TABLET | Refills: 1 | OUTPATIENT
Start: 2019-07-30

## 2019-07-30 RX ORDER — SERTRALINE HYDROCHLORIDE 100 MG/1
100 TABLET, FILM COATED ORAL DAILY
Qty: 30 TABLET | Refills: 0 | Status: SHIPPED | OUTPATIENT
Start: 2019-07-30 | End: 2019-08-16 | Stop reason: SDUPTHER

## 2019-08-12 ENCOUNTER — APPOINTMENT (OUTPATIENT)
Dept: LAB | Age: 74
End: 2019-08-12
Payer: MEDICARE

## 2019-08-12 DIAGNOSIS — E78.00 HYPERCHOLESTEROLEMIA: ICD-10-CM

## 2019-08-12 LAB
ALBUMIN SERPL BCP-MCNC: 4.2 G/DL (ref 3.5–5)
ALP SERPL-CCNC: 63 U/L (ref 46–116)
ALT SERPL W P-5'-P-CCNC: 26 U/L (ref 12–78)
ANION GAP SERPL CALCULATED.3IONS-SCNC: 9 MMOL/L (ref 4–13)
AST SERPL W P-5'-P-CCNC: 17 U/L (ref 5–45)
BILIRUB SERPL-MCNC: 0.47 MG/DL (ref 0.2–1)
BUN SERPL-MCNC: 15 MG/DL (ref 5–25)
CALCIUM SERPL-MCNC: 9.5 MG/DL (ref 8.3–10.1)
CHLORIDE SERPL-SCNC: 108 MMOL/L (ref 100–108)
CHOLEST SERPL-MCNC: 187 MG/DL (ref 50–200)
CK SERPL-CCNC: 122 U/L (ref 26–192)
CO2 SERPL-SCNC: 27 MMOL/L (ref 21–32)
CREAT SERPL-MCNC: 0.64 MG/DL (ref 0.6–1.3)
GFR SERPL CREATININE-BSD FRML MDRD: 89 ML/MIN/1.73SQ M
GLUCOSE P FAST SERPL-MCNC: 98 MG/DL (ref 65–99)
HDLC SERPL-MCNC: 72 MG/DL (ref 40–60)
LDLC SERPL CALC-MCNC: 93 MG/DL (ref 0–100)
NONHDLC SERPL-MCNC: 115 MG/DL
POTASSIUM SERPL-SCNC: 4 MMOL/L (ref 3.5–5.3)
PROT SERPL-MCNC: 7.4 G/DL (ref 6.4–8.2)
SODIUM SERPL-SCNC: 144 MMOL/L (ref 136–145)
TRIGL SERPL-MCNC: 109 MG/DL

## 2019-08-12 PROCEDURE — 36415 COLL VENOUS BLD VENIPUNCTURE: CPT

## 2019-08-12 PROCEDURE — 80061 LIPID PANEL: CPT

## 2019-08-12 PROCEDURE — 80053 COMPREHEN METABOLIC PANEL: CPT

## 2019-08-12 PROCEDURE — 82550 ASSAY OF CK (CPK): CPT

## 2019-08-16 ENCOUNTER — OFFICE VISIT (OUTPATIENT)
Dept: INTERNAL MEDICINE CLINIC | Facility: CLINIC | Age: 74
End: 2019-08-16
Payer: MEDICARE

## 2019-08-16 VITALS
WEIGHT: 183 LBS | HEART RATE: 92 BPM | DIASTOLIC BLOOD PRESSURE: 84 MMHG | SYSTOLIC BLOOD PRESSURE: 128 MMHG | OXYGEN SATURATION: 98 % | TEMPERATURE: 98.2 F | HEIGHT: 64 IN | BODY MASS INDEX: 31.24 KG/M2

## 2019-08-16 DIAGNOSIS — E78.00 HYPERCHOLESTEREMIA: ICD-10-CM

## 2019-08-16 DIAGNOSIS — Z12.11 SCREENING FOR MALIGNANT NEOPLASM OF COLON: Primary | ICD-10-CM

## 2019-08-16 DIAGNOSIS — E78.5 HYPERLIPIDEMIA, UNSPECIFIED HYPERLIPIDEMIA TYPE: ICD-10-CM

## 2019-08-16 DIAGNOSIS — F41.9 ANXIETY: ICD-10-CM

## 2019-08-16 PROCEDURE — G0439 PPPS, SUBSEQ VISIT: HCPCS | Performed by: FAMILY MEDICINE

## 2019-08-16 PROCEDURE — 99214 OFFICE O/P EST MOD 30 MIN: CPT | Performed by: FAMILY MEDICINE

## 2019-08-16 RX ORDER — SERTRALINE HYDROCHLORIDE 100 MG/1
100 TABLET, FILM COATED ORAL DAILY
Qty: 90 TABLET | Refills: 1 | Status: SHIPPED | OUTPATIENT
Start: 2019-08-16 | End: 2020-03-04 | Stop reason: SDUPTHER

## 2019-08-16 RX ORDER — ALPRAZOLAM 0.5 MG/1
0.5 TABLET ORAL DAILY PRN
Qty: 30 TABLET | Refills: 1 | Status: SHIPPED | OUTPATIENT
Start: 2019-08-16 | End: 2020-04-10 | Stop reason: SDUPTHER

## 2019-08-16 RX ORDER — SIMVASTATIN 20 MG
20 TABLET ORAL
Qty: 90 TABLET | Refills: 1 | Status: SHIPPED | OUTPATIENT
Start: 2019-08-16 | End: 2020-03-04 | Stop reason: SDUPTHER

## 2019-08-16 NOTE — PROGRESS NOTES
Assessment/Plan:    No problem-specific Assessment & Plan notes found for this encounter  Problem List Items Addressed This Visit     None      Visit Diagnoses     Screening for malignant neoplasm of colon    -  Primary    Anxiety        Relevant Medications    ALPRAZolam (XANAX) 0 5 mg tablet    sertraline (ZOLOFT) 100 mg tablet    Hyperlipidemia, unspecified hyperlipidemia type        Relevant Medications    simvastatin (ZOCOR) 20 mg tablet    Hypercholesteremia        Relevant Medications    simvastatin (ZOCOR) 20 mg tablet    Other Relevant Orders    Lipid panel    CK    Comprehensive metabolic panel                 discussion :  check labs  in 6 months, fasting  No changes to medications and doing very well  Call if any issues, okay to try ketogenic diet  We will be scheduling total knee replacement in December  Okay to continue with exercises that she can tolerate  Okay to take ibuprofen with food as well as extra-strength Tylenol  Body would like to hold off on prescription medications at this time    BMI Counseling: Body mass index is 31 24 kg/m²  Discussed the patient's BMI with her  The BMI is above average  BMI counseling and education was provided to the patient  Nutrition recommendations include reducing portion sizes  Subjective:  F/up hld     Patient ID: Maribel Batista is a 69 y o  female  Anxiety         Depression     Knee Pain        Depression (Follow-Up): The patient states her depression has been stable since the last visit  She has no comorbid illnesses  Interval Events: doing well but  recently dx with lung cancer after brain surgery and colonoscopy  She has had no significant interval events     Interval Symptoms: improved depression,-improved depressed mood,-improved loss of interest or pleasure in activities,-stable insomnia,-denies feelings of worthlessness,-denies feelings of guilt,-denies trouble concentrating,-improved anxiety-and-denies sexual dysfunction  February 2019,  passed away in the winter 2018 but she has a very large support system and is doing relatively well  Taking it day by day  Associated symptoms include: no recent weight gain-and-no thoughts of suicide  Social Support: the patient has good social support  Medications: the patient is adherent with her medication regimen -She denies medication side effects  Pt has been on zoloft >5 years  States she feels really stable on it and does not feel the need to go off of it August 2019, doing very well and does not require any medication changes    Hyperlipidemia (Follow-Up): The patient states her hyperlipidemia has been under good control since the last visit  She has no comorbid illnesses  Symptoms: denies chest pain,-denies intermittent leg claudication,-denies muscle pain-and-denies muscle weakness  Associated symptoms include no focal neurologic deficits-and-no memory loss  Medications: the patient is adherent with her medication regimen -the patient complains of medication side effects  The patient is doing well with her hyperlipidemia goals  the patient's LDL goal is <100 mg/dL  -the patient's last LDL was 72 mg/dL  -(5/2014)  February 2019, due for labs and has lab slip  August 2019, very well controlled    Gastroesophageal Reflux Disease (Follow-Up): The patient is being seen for follow-up of gastroesophageal reflux disease  Is essentially resolved symptoms  Not on anything on a regular basis      Review of Systems   Psychiatric/Behavioral: Positive for depression           Constitutional:  Denies fever or chills , no significant weight change  Eyes:  Denies change in visual acuity   HENT:  Denies nasal congestion or sore throat   Respiratory:  Denies cough or shortness of breath or wheezing  Cardiovascular:  Denies palpitations or chest pain  GI:  Denies abdominal pain, nausea, or vomiting  Integument:  Denies rash   Neurologic:  Denies headache or focal weakness, no dizziness  Musculoskeletal bilateral knee pain which is not new  She was told she needs a total knee replacement        Objective:      /84 (BP Location: Left arm, Patient Position: Sitting, Cuff Size: Standard)   Pulse 92   Temp 98 2 °F (36 8 °C) (Tympanic)   Ht 5' 4 17" (1 63 m) Comment: shoes off  Wt 83 kg (183 lb) Comment: shoes off  SpO2 98%   BMI 31 24 kg/m²          Physical Exam    Constitutional:  Well developed, well nourished, no acute distress, non-toxic appearance   Eyes:  PERRL, conjunctiva normal , non icteric sclera  HENT:  Atraumatic, oropharynx moist  Neck-  supple   Respiratory:  CTA b/l, normal breath sounds, no rales, no wheezing   Cardiovascular:  RRR, no murmurs, no LE edema b/l  GI:  Soft, nondistended, normal bowel sounds x 4, nontender, no organomegaly, no mass, no rebound, no guarding   Neurologic:  no focal deficits noted   Psychiatric:  Speech and behavior appropriate , AAO x 3  Musculoskeletal bilateral mild crepitus noted in knees    Gait is slowed but appropriate  Skin, bilateral rotation noted with severely dry skin on face

## 2019-08-16 NOTE — PROGRESS NOTES
Assessment and Plan:     Problem List Items Addressed This Visit     None      Visit Diagnoses     Screening for malignant neoplasm of colon    -  Primary    Anxiety        Relevant Medications    ALPRAZolam (XANAX) 0 5 mg tablet    sertraline (ZOLOFT) 100 mg tablet    Hyperlipidemia, unspecified hyperlipidemia type        Relevant Medications    simvastatin (ZOCOR) 20 mg tablet    Hypercholesteremia        Relevant Medications    simvastatin (ZOCOR) 20 mg tablet    Other Relevant Orders    Lipid panel    CK    Comprehensive metabolic panel        Mammogram up-to-date  Colonoscopy done approximately 5 years ago  She is doing 3 more years    Done by Dr Marshall Carter colorectal surgeon  Eye exam up-to-date  Dental exam up-to-date  Pap test not needed  DEXA scan up-to-date this year  Influenza up-to-date  Pneumonia shot up-to-date       History of Present Illness:     Patient presents for Medicare Annual Wellness visit    Patient Care Team:  Clarissa Domínguez DO as PCP - General     Problem List:     Patient Active Problem List   Diagnosis    Hypercholesterolemia    Gastroesophageal reflux disease without esophagitis    Depression    Rosacea    Left knee pain    History of arthroscopy of left knee    Arthritis of left knee    Trochanteric bursitis, left hip    Primary osteoarthritis of left knee      Past Medical and Surgical History:     Past Medical History:   Diagnosis Date    Hyperlipidemia     Skin tag     last assessed 4/21/17, resolved 10/2/17      Past Surgical History:   Procedure Laterality Date    COLONOSCOPY      Complete, with Dr Meyers, resolved 7/11/13    KNEE ARTHROSCOPY Left     resolved 2003    KNEE ARTHROSCOPY W/ MENISCAL REPAIR Left 2000    TONSILLECTOMY AND ADENOIDECTOMY        Family History:     Family History   Problem Relation Age of Onset    Stroke Mother         CVA    Hyperlipidemia Mother     Stroke Maternal Grandfather         CVA    Stroke Family         CVA    Other Family breast disorders    Prostate cancer Father 76    Other Maternal Aunt         breast disorders       Social History:     Social History     Tobacco Use   Smoking Status Former Smoker    Last attempt to quit: Mary Bishop 39 Years since quittin 6   Smokeless Tobacco Never Used     Social History     Substance and Sexual Activity   Alcohol Use Yes    Frequency: 2-4 times a month    Drinks per session: 1 or 2    Comment: Social      Social History     Substance and Sexual Activity   Drug Use No      Medications and Allergies:     Current Outpatient Medications   Medication Sig Dispense Refill    ALPRAZolam (XANAX) 0 5 mg tablet Take 1 tablet (0 5 mg total) by mouth daily as needed (prn) 30 tablet 1    B Complex Vitamins (B COMPLEX PO) Take by mouth      CALCIUM PO Take by mouth      Cholecalciferol (VITAMIN D3 PO) Take by mouth      Ibuprofen-Diphenhydramine Cit 200-38 MG TABS Take by mouth as needed      sertraline (ZOLOFT) 100 mg tablet Take 1 tablet (100 mg total) by mouth daily 90 tablet 1    simvastatin (ZOCOR) 20 mg tablet Take 1 tablet (20 mg total) by mouth daily at bedtime 90 tablet 1     No current facility-administered medications for this visit  Allergies   Allergen Reactions    Tetracyclines & Related      blisters      Immunizations:     Immunization History   Administered Date(s) Administered    INFLUENZA 2018    Influenza Split High Dose Preservative Free IM 10/05/2015, 2016, 10/20/2017    Pneumococcal Conjugate 13-Valent 2017    Pneumococcal Polysaccharide PPV23 2019      Medicare Screening Tests and Risk Assessments:     Kayleen Alexandra is here for her Subsequent Wellness visit  Health Risk Assessment:  Patient rates overall health as very good  Patient feels that their physical health rating is Same  Eyesight was rated as Same  Hearing was rated as Same  Patient feels that their emotional and mental health rating is Much better   Pain experienced by patient in the last 7 days has been A lot  Patient's pain rating has been 7/10  Patient states that she has experienced no weight loss or gain in last 6 months  Emotional/Mental Health:  Patient has not been feeling nervous/anxious  PHQ-9 Depression Screening:    Frequency of the following problems over the past two weeks:      1  Little interest or pleasure in doing things: 0 - not at all      2  Feeling down, depressed, or hopeless: 0 - not at all  PHQ-2 Score: 0          Broken Bones/Falls: Fall Risk Assessment:    In the past year, patient has experienced: History of falling in past year    Number of falls: 2 or more    Injured during fall: No      Patient feels unsteady when standing or walking     Patient is not worried about falling  Bladder/Bowel:  Patient has leaked urine accidently in the last six months  Patient reports no loss of bowel control  Immunizations:  Patient has had a flu vaccination within the last year  Patient has received a pneumonia shot  Patient has not received a shingles shot  Patient has not received tetanus/diphtheria shot  Home Safety:  Patient does not have trouble with stairs inside or outside of their home  Patient currently reports that there are no safety hazards present in home, working smoke alarms, working carbon monoxide detectors  Preventative Screenings:   Breast cancer screening performed, no colon cancer screen completed, cholesterol screen completed, no glaucoma eye exam completed    Nutrition:  Current diet: Regular with servings of the following:    Medications:  Patient is not currently taking any over-the-counter supplements  Patient is able to manage medications  Lifestyle Choices:  Patient reports no tobacco use  Patient has smoked or used tobacco in the past   Patient has stopped her tobacco use  Tobacco use quit date: 40 years ago  Patient reports alcohol use  Alcohol use per week: 1-2  Patient drives a vehicle    Patient wears seat belt  Current level of exercise of physical activity described by patient as: maintaining a home a    Activities of Daily Living:  Can get out of bed by his or her self, able to dress self, able to make own meals, able to do own shopping, able to bathe self, can do own laundry/housekeeping, can manage own money, pay bills and track expenses    Previous Hospitalizations:  No hospitalization or ED visit in past 12 months        Advanced Directives:  Patient has decided on a power of   Patient has spoken to designated power of   Patient has completed advanced directive

## 2019-08-16 NOTE — PATIENT INSTRUCTIONS
Obesity   AMBULATORY CARE:   Obesity  is when your body mass index (BMI) is greater than 30  Your healthcare provider will use your height and weight to measure your BMI  The risks of obesity include  many health problems, such as injuries or physical disability  You may need tests to check for the following:  · Diabetes     · High blood pressure or high cholesterol     · Heart disease     · Gallbladder or liver disease     · Cancer of the colon, breast, prostate, liver, or kidney     · Sleep apnea     · Arthritis or gout  Seek care immediately if:   · You have a severe headache, confusion, or difficulty speaking  · You have weakness on one side of your body  · You have chest pain, sweating, or shortness of breath  Contact your healthcare provider if:   · You have symptoms of gallbladder or liver disease, such as pain in your upper abdomen  · You have knee or hip pain and discomfort while walking  · You have symptoms of diabetes, such as intense hunger and thirst, and frequent urination  · You have symptoms of sleep apnea, such as snoring or daytime sleepiness  · You have questions or concerns about your condition or care  Treatment for obesity  focuses on helping you lose weight to improve your health  Even a small decrease in BMI can reduce the risk for many health problems  Your healthcare provider will help you set a weight-loss goal   · Lifestyle changes  are the first step in treating obesity  These include making healthy food choices and getting regular physical activity  Your healthcare provider may suggest a weight-loss program that involves coaching, education, and therapy  · Medicine  may help you lose weight when it is used with a healthy diet and physical activity  · Surgery  can help you lose weight if you are very obese and have other health problems  There are several types of weight-loss surgery  Ask your healthcare provider for more information    Be successful losing weight:   · Set small, realistic goals  An example of a small goal is to walk for 20 minutes 5 days a week  Anther goal is to lose 5% of your body weight  · Tell friends, family members, and coworkers about your goals  and ask for their support  Ask a friend to lose weight with you, or join a weight-loss support group  · Identify foods or triggers that may cause you to overeat , and find ways to avoid them  Remove tempting high-calorie foods from your home and workplace  Place a bowl of fresh fruit on your kitchen counter  If stress causes you to eat, then find other ways to cope with stress  · Keep a diary to track what you eat and drink  Also write down how many minutes of physical activity you do each day  Weigh yourself once a week and record it in your diary  Eating changes: You will need to eat 500 to 1,000 fewer calories each day than you currently eat to lose 1 to 2 pounds a week  The following changes will help you cut calories:  · Eat smaller portions  Use small plates, no larger than 9 inches in diameter  Fill your plate half full of fruits and vegetables  Measure your food using measuring cups until you know what a serving size looks like  · Eat 3 meals and 1 or 2 snacks each day  Plan your meals in advance  Cristina Tan and eat at home most of the time  Eat slowly  · Eat fruits and vegetables at every meal   They are low in calories and high in fiber, which makes you feel full  Do not add butter, margarine, or cream sauce to vegetables  Use herbs to season steamed vegetables  · Eat less fat and fewer fried foods  Eat more baked or grilled chicken and fish  These protein sources are lower in calories and fat than red meat  Limit fast food  Dress your salads with olive oil and vinegar instead of bottled dressing  · Limit the amount of sugar you eat  Do not drink sugary beverages  Limit alcohol  Activity changes:  Physical activity is good for your body in many ways   It helps you burn calories and build strong muscles  It decreases stress and depression, and improves your mood  It can also help you sleep better  Talk to your healthcare provider before you begin an exercise program   · Exercise for at least 30 minutes 5 days a week  Start slowly  Set aside time each day for physical activity that you enjoy and that is convenient for you  It is best to do both weight training and an activity that increases your heart rate, such as walking, bicycling, or swimming  · Find ways to be more active  Do yard work and housecleaning  Walk up the stairs instead of using elevators  Spend your leisure time going to events that require walking, such as outdoor festivals or fairs  This extra physical activity can help you lose weight and keep it off  Follow up with your healthcare provider as directed: You may need to meet with a dietitian  Write down your questions so you remember to ask them during your visits  © 2017 [address] [employer] Information is for End User's use only and may not be sold, redistributed or otherwise used for commercial purposes  All illustrations and images included in CareNotes® are the copyrighted property of [employer]  or Eliezer Torres  The above information is an  only  It is not intended as medical advice for individual conditions or treatments  Talk to your doctor, nurse or pharmacist before following any medical regimen to see if it is safe and effective for you  Urinary Incontinence   WHAT YOU NEED TO KNOW:   What is urinary incontinence? Urinary incontinence (UI) is when you lose control of your bladder  What causes UI? UI occurs because your bladder cannot store or empty urine properly  The following are the most common types of UI:  · Stress incontinence  is when you leak urine due to increased bladder pressure  This may happen when you cough, sneeze, or exercise       · Urge incontinence  is when you feel the need to urinate right away and leak urine accidentally  · Mixed incontinence  is when you have both stress and urge UI  What are the signs and symptoms of UI?   · You feel like your bladder does not empty completely when you urinate  · You urinate often and need to urinate immediately  · You leak urine when you sleep, or you wake up with the urge to urinate  · You leak urine when you cough, sneeze, exercise, or laugh  How is UI diagnosed? Your healthcare provider will ask how often you leak urine and whether you have stress or urge symptoms  Tell him which medicines you take, how often you urinate, and how much liquid you drink each day  You may need any of the following tests:  · Urine tests  may show infection or kidney function  · A pelvic exam  may be done to check for blockages  A pelvic exam will also show if your bladder, uterus, or other organs have moved out of place  · An x-ray, ultrasound, or CT  may show problems with parts of your urinary system  You may be given contrast liquid to help your organs show up better in the pictures  Tell the healthcare provider if you have ever had an allergic reaction to contrast liquid  Do not enter the MRI room with anything metal  Metal can cause serious injury  Tell the healthcare provider if you have any metal in or on your body  · A bladder scan  will show how much urine is left in your bladder after you urinate  You will be asked to urinate and then healthcare providers will use a small ultrasound machine to check the urine left in your bladder  · Cystometry  is used to check the function of your urinary system  Your healthcare provider checks the pressure in your bladder while filling it with fluid  Your bladder pressure may also be tested when your bladder is full and while you urinate  How is UI treated? · Medicines  can help strengthen your bladder control      · Electrical stimulation  is used to send a small amount of electrical energy to your pelvic floor muscles  This helps control your bladder function  Electrodes may be placed outside your body or in your rectum  For women, the electrodes may be placed in the vagina  · A bulking agent  may be injected into the wall of your urethra to make it thicker  This helps keep your urethra closed and decreases urine leakage  · Devices  such as a clamp, pessary, or tampon may help stop urine leaks  Ask your healthcare provider for more information about these and other devices  · Surgery  may be needed if other treatments do not work  Several types of surgery can help improve your bladder control  Ask your healthcare provider for more information about the surgery you may need  How can I manage my symptoms? · Do pelvic muscle exercises often  Your pelvic muscles help you stop urinating  Squeeze these muscles tight for 5 seconds, then relax for 5 seconds  Gradually work up to squeezing for 10 seconds  Do 3 sets of 15 repetitions a day, or as directed  This will help strengthen your pelvic muscles and improve bladder control  · A catheter  may be used to help empty your bladder  A catheter is a tiny, plastic tube that is put into your bladder to drain your urine  Your healthcare provider may tell you to use a catheter to prevent your bladder from getting too full and leaking urine  · Keep a UI record  Write down how often you leak urine and how much you leak  Make a note of what you were doing when you leaked urine  · Train your bladder  Go to the bathroom at set times, such as every 2 hours, even if you do not feel the urge to go  You can also try to hold your urine when you feel the urge to go  For example, hold your urine for 5 minutes when you feel the urge to go  As that becomes easier, hold your urine for 10 minutes  · Drink liquids as directed  Ask your healthcare provider how much liquid to drink each day and which liquids are best for you   You may need to limit the amount of liquid you drink to help control your urine leakage  Limit or do not have drinks that contain caffeine or alcohol  Do not drink any liquid right before you go to bed  · Prevent constipation  Eat a variety of high-fiber foods  Good examples are high-fiber cereals, beans, vegetables, and whole-grain breads  Prune juice may help make your bowel movement softer  Walking is the best way to trigger your intestines to have a bowel movement  · Exercise regularly and maintain a healthy weight  Ask your healthcare provider how much you should weigh and about the best exercise plan for you  Weight loss and exercise will decrease pressure on your bladder and help you control your leakage  Ask him to help you create a weight loss plan if you are overweight  When should I seek immediate care? · You have severe pain  · You are confused or cannot think clearly  When should I contact my healthcare provider? · You have a fever  · You see blood in your urine  · You have pain when you urinate  · You have new or worse pain, even after treatment  · Your mouth feels dry or you have vision changes  · Your urine is cloudy or smells bad  · You have questions or concerns about your condition or care  CARE AGREEMENT:   You have the right to help plan your care  Learn about your health condition and how it may be treated  Discuss treatment options with your caregivers to decide what care you want to receive  You always have the right to refuse treatment  The above information is an  only  It is not intended as medical advice for individual conditions or treatments  Talk to your doctor, nurse or pharmacist before following any medical regimen to see if it is safe and effective for you  © 2017 [address] [employer] Information is for End User's use only and may not be sold, redistributed or otherwise used for commercial purposes   All illustrations and images included in CareNotes® are the copyrighted property of [employer]  or Eliezer Torres  Cigarette Smoking and Your Health   AMBULATORY CARE:   Risks to your health if you smoke:  Nicotine and other chemicals found in tobacco damage every cell in your body  Even if you are a light smoker, you have an increased risk for cancer, heart disease, and lung disease  If you are pregnant or have diabetes, smoking increases your risk for complications  Benefits to your health if you stop smoking:   · You decrease respiratory symptoms such as coughing, wheezing, and shortness of breath  · You reduce your risk for cancers of the lung, mouth, throat, kidney, bladder, pancreas, stomach, and cervix  If you already have cancer, you increase the benefits of chemotherapy  You also reduce your risk for cancer returning or a second cancer from developing  · You reduce your risk for heart disease, blood clots, heart attack, and stroke  · You reduce your risk for lung infections, and diseases such as pneumonia, asthma, chronic bronchitis, and emphysema  · Your circulation improves  More oxygen can be delivered to your body  If you have diabetes, you lower your risk for complications, such as kidney, artery, and eye diseases  You also lower your risk for nerve damage  Nerve damage can lead to amputations, poor vision, and blindness  · You improve your body's ability to heal and to fight infections  Benefits to the health of others if you stop smoking:  Tobacco is harmful to nonsmokers who breathe in your secondhand smoke  The following are ways the health of others around you may improve when you stop smoking:  · You lower the risks for lung cancer and heart disease in nonsmoking adults  · If you are pregnant, you lower the risk for miscarriage, early delivery, low birth weight, and stillbirth  You also lower your baby's risk for SIDS, obesity, developmental delay, and neurobehavioral problems, such as ADHD  · If you have children, you lower their risk for ear infections, colds, pneumonia, bronchitis, and asthma  For more information and support to stop smoking:   · Smokefree  gov  Phone: 1- 058 - 116-2472  Web Address: www smokefree  gov  Follow up with your healthcare provider as directed:  Write down your questions so you remember to ask them during your visits  © 2017 [address] [employer] Information is for End User's use only and may not be sold, redistributed or otherwise used for commercial purposes  All illustrations and images included in CareNotes® are the copyrighted property of [employer]  or Eliezer Torres  The above information is an  only  It is not intended as medical advice for individual conditions or treatments  Talk to your doctor, nurse or pharmacist before following any medical regimen to see if it is safe and effective for you  Fall Prevention   AMBULATORY CARE:   Fall prevention  includes ways to make your home and other areas safer  It also includes ways you can move more carefully to prevent a fall  Health conditions that cause changes in your blood pressure, vision, or muscle strength and coordination may increase your risk for falls  Medicines may also increase your risk for falls if they make you dizzy, weak, or sleepy  Call 911 or have someone else call if:   · You have fallen and are unconscious  · You have fallen and cannot move part of your body  Contact your healthcare provider if:   · You have fallen and have pain or a headache  · You have questions or concerns about your condition or care  Fall prevention tips:   · Stand or sit up slowly  This may help you keep your balance and prevent falls  · Use assistive devices as directed  Your healthcare provider may suggest that you use a cane or walker to help you keep your balance  You may need to have grab bars put in your bathroom near the toilet or in the shower      · Wear shoes that fit well and have soles that   Wear shoes both inside and outside  Use slippers with good   Do not wear shoes with high heels  · Wear a personal alarm  This is a device that allows you to call 911 if you fall and need help  Ask your healthcare provider for more information  · Stay active  Exercise can help strengthen your muscles and improve your balance  Your healthcare provider may recommend water aerobics or walking  He or she may also recommend physical therapy to improve your coordination  Never start an exercise program without talking to your healthcare provider first      · Manage your medical conditions  Keep all appointments with your healthcare providers  Visit your eye doctor as directed  Home safety tips:   · Add items to prevent falls in the bathroom  Put nonslip strips on your bath or shower floor to prevent you from slipping  Use a bath mat if you do not have carpet in the bathroom  This will prevent you from falling when you step out of the bath or shower  Use a shower seat so you do not need to stand while you shower  Sit on the toilet or a chair in your bathroom to dry yourself and put on clothing  This will prevent you from losing your balance from drying or dressing yourself while you are standing  · Keep paths clear  Remove books, shoes, and other objects from walkways and stairs  Place cords for telephones and lamps out of the way so that you do not need to walk over them  Tape them down if you cannot move them  Remove small rugs  If you cannot remove a rug, secure it with double-sided tape  This will prevent you from tripping  · Install bright lights in your home  Use night lights to help light paths to the bathroom or kitchen  Always turn on the light before you start walking  · Keep items you use often on shelves within reach  Do not use a step stool to help you reach an item  · Paint or place reflective tape on the edges of your stairs    This will help you see the stairs better  Follow up with your healthcare provider as directed:  Write down your questions so you remember to ask them during your visits  © 2017 [address] [employer] Information is for End User's use only and may not be sold, redistributed or otherwise used for commercial purposes  All illustrations and images included in CareNotes® are the copyrighted property of [employer]  or Eliezer Torres  The above information is an  only  It is not intended as medical advice for individual conditions or treatments  Talk to your doctor, nurse or pharmacist before following any medical regimen to see if it is safe and effective for you  Advance Directives   WHAT YOU NEED TO KNOW:   What are advance directives? Advance directives are legal documents that state your wishes and plans for medical care  These plans are made ahead of time in case you lose your ability to make decisions for yourself  Advance directives can apply to any medical decision, such as the treatments you want, and if you want to donate organs  What are the types of advance directives? There are many types of advance directives, and each state has rules about how to use them  You may choose a combination of any of the following:  · Living will: This is a written record of the treatment you want  You can also choose which treatments you do not want, which to limit, and which to stop at a certain time  This includes surgery, medicine, IV fluid, and tube feedings  · Durable power of  for healthcare [facility]): This is a written record that states who you want to make healthcare choices for you when you are unable to make them for yourself  This person, called a proxy, is usually a family member or a friend  You may choose more than 1 proxy  · Do not resuscitate (DNR) order:  A DNR order is used in case your heart stops beating or you stop breathing   It is a request not to have certain forms of treatment, such as CPR  A DNR order may be included in other types of advance directives  · Medical directive: This covers the care that you want if you are in a coma, near death, or unable to make decisions for yourself  You can list the treatments you want for each condition  Treatment may include pain medicine, surgery, blood transfusions, dialysis, IV or tube feedings, and a ventilator (breathing machine)  · Values history: This document has questions about your views, beliefs, and how you feel and think about life  This information can help others choose the care that you would choose  Why are advance directives important? An advance directive helps you control your care  Although spoken wishes may be used, it is better to have your wishes written down  Spoken wishes can be misunderstood, or not followed  Treatments may be given even if you do not want them  An advance directive may make it easier for your family to make difficult choices about your care  How do I decide what to put in my advance directives? · Make informed decisions:  Make sure you fully understand treatments or care you may receive  Think about the benefits and problems your decisions could cause for you or your family  Talk to healthcare providers if you have concerns or questions before you write down your wishes  You may also want to talk with your Gnosticist or , or a   Check your state laws to make sure that what you put in your advance directive is legal      · Sign all forms:  Sign and date your advance directive when you have finished  You may also need 2 witnesses to sign the forms  Witnesses cannot be your doctor or his staff, your spouse, heirs or beneficiaries, people you owe money to, or your chosen proxy  Talk to your family, proxy, and healthcare providers about your advance directive  Give each person a copy, and keep one for yourself in a place you can get to easily   Do not keep it hidden or locked away  · Review and revise your plans: You can revise your advance directive at any time, as long as you are able to make decisions  Review your plan every year, and when there are changes in your life, or your health  When you make changes, let your family, proxy, and healthcare providers know  Give each a new copy  Where can I find more information? · [employer]  [address] , [place] [address]  Phone: 6- 978 - [phone]  Phone: 4- 170 - [phone]  Web Address: http://www  aafp org  · [address] [facility])  [address] [address] [facility], [address] [facility] , [address] [place] [address]  Phone: [phone]  Phone: [phone]  Web Address: Luis henderson  CARE AGREEMENT:   You have the right to help plan your care  To help with this plan, you must learn about your health condition and treatment options  You must also learn about advance directives and how they are used  Work with your healthcare providers to decide what care will be used to treat you  You always have the right to refuse treatment  The above information is an  only  It is not intended as medical advice for individual conditions or treatments  Talk to your doctor, nurse or pharmacist before following any medical regimen to see if it is safe and effective for you  © 2017 [address] [employer] Information is for End User's use only and may not be sold, redistributed or otherwise used for commercial purposes  All illustrations and images included in CareNotes® are the copyrighted property of [employer]  or Eliezer Torres

## 2019-10-02 ENCOUNTER — HOSPITAL ENCOUNTER (OUTPATIENT)
Dept: RADIOLOGY | Facility: HOSPITAL | Age: 74
Discharge: HOME | End: 2019-10-02
Attending: ORTHOPAEDIC SURGERY
Payer: MEDICARE

## 2019-10-02 ENCOUNTER — OFFICE VISIT (OUTPATIENT)
Dept: OBGYN CLINIC | Facility: HOSPITAL | Age: 74
End: 2019-10-02
Payer: MEDICARE

## 2019-10-02 ENCOUNTER — PREP FOR PROCEDURE (OUTPATIENT)
Dept: OBGYN CLINIC | Facility: HOSPITAL | Age: 74
End: 2019-10-02

## 2019-10-02 VITALS
DIASTOLIC BLOOD PRESSURE: 72 MMHG | HEART RATE: 100 BPM | SYSTOLIC BLOOD PRESSURE: 113 MMHG | HEIGHT: 64 IN | WEIGHT: 182 LBS | BODY MASS INDEX: 31.07 KG/M2

## 2019-10-02 DIAGNOSIS — M79.605 PAIN IN LEFT LEG: ICD-10-CM

## 2019-10-02 DIAGNOSIS — M17.12 ARTHRITIS OF LEFT KNEE: ICD-10-CM

## 2019-10-02 DIAGNOSIS — M79.605 PAIN IN LEFT LEG: Primary | ICD-10-CM

## 2019-10-02 DIAGNOSIS — Z01.818 OTHER SPECIFIED PRE-OPERATIVE EXAMINATION: Primary | ICD-10-CM

## 2019-10-02 PROCEDURE — 1123F ACP DISCUSS/DSCN MKR DOCD: CPT | Performed by: ORTHOPAEDIC SURGERY

## 2019-10-02 PROCEDURE — 73502 X-RAY EXAM HIP UNI 2-3 VIEWS: CPT

## 2019-10-02 PROCEDURE — 99213 OFFICE O/P EST LOW 20 MIN: CPT | Performed by: ORTHOPAEDIC SURGERY

## 2019-10-02 RX ORDER — FERROUS SULFATE TAB EC 324 MG (65 MG FE EQUIVALENT) 324 (65 FE) MG
324 TABLET DELAYED RESPONSE ORAL
Qty: 60 TABLET | Refills: 0 | Status: SHIPPED | OUTPATIENT
Start: 2019-10-02 | End: 2019-11-06

## 2019-10-02 RX ORDER — FOLIC ACID 1 MG/1
1 TABLET ORAL DAILY
Qty: 30 TABLET | Refills: 0 | Status: SHIPPED | OUTPATIENT
Start: 2019-10-02 | End: 2019-11-06

## 2019-10-02 RX ORDER — CHLORHEXIDINE GLUCONATE 0.12 MG/ML
15 RINSE ORAL ONCE
Status: CANCELLED | OUTPATIENT
Start: 2019-10-02 | End: 2019-10-02

## 2019-10-02 RX ORDER — CEFAZOLIN SODIUM 2 G/50ML
2000 SOLUTION INTRAVENOUS ONCE
Status: CANCELLED | OUTPATIENT
Start: 2019-10-02 | End: 2019-10-02

## 2019-10-02 RX ORDER — ACETAMINOPHEN 325 MG/1
975 TABLET ORAL ONCE
Status: CANCELLED | OUTPATIENT
Start: 2019-10-02 | End: 2019-10-02

## 2019-10-02 RX ORDER — SODIUM CHLORIDE, SODIUM LACTATE, POTASSIUM CHLORIDE, CALCIUM CHLORIDE 600; 310; 30; 20 MG/100ML; MG/100ML; MG/100ML; MG/100ML
125 INJECTION, SOLUTION INTRAVENOUS CONTINUOUS
Status: CANCELLED | OUTPATIENT
Start: 2019-10-02

## 2019-10-02 RX ORDER — GABAPENTIN 300 MG/1
300 CAPSULE ORAL ONCE
Status: CANCELLED | OUTPATIENT
Start: 2019-10-02 | End: 2019-10-02

## 2019-10-02 RX ORDER — ASCORBIC ACID 500 MG
500 TABLET ORAL DAILY
Qty: 30 TABLET | Refills: 1 | Status: SHIPPED | OUTPATIENT
Start: 2019-10-02 | End: 2020-01-17

## 2019-10-02 NOTE — PROGRESS NOTES
Subjective;    68-year-old adult female patient well known to the practice  She has established osteoarthritic changes of her left knee  She recently had a round of lubricant or Visco supplement injections    She is attempted ADL alterations,   And has practiced generalized wellness  Her intention on arrival to the office today is to discuss elective total knee replacement arthroplasty pertaining to her left knee    In review she has had previous treatment for the soft tissues overlying the greater troch bursa of her left hip  She has discomfort in the lateral hip and lateral buttock with internal and external rotation of the hip therefore x-rays of the left hip were ordered at this time    Past Medical History:   Diagnosis Date    Hyperlipidemia     Skin tag     last assessed 17, resolved 10/2/17        Past Surgical History:   Procedure Laterality Date    COLONOSCOPY      Complete, with Dr Meyers, resolved 13    KNEE ARTHROSCOPY Left     resolved     KNEE ARTHROSCOPY W/ MENISCAL REPAIR Left 2000    TONSILLECTOMY AND ADENOIDECTOMY         Family History   Problem Relation Age of Onset    Stroke Mother         CVA    Hyperlipidemia Mother     Stroke Maternal Grandfather         CVA    Stroke Family         CVA    Other Family         breast disorders    Prostate cancer Father 76    Other Maternal Aunt         breast disorders        Social History     Tobacco Use    Smoking status: Former Smoker     Last attempt to quit: 1980     Years since quittin 7    Smokeless tobacco: Never Used   Substance Use Topics    Alcohol use: Yes     Frequency: 2-4 times a month     Drinks per session: 1 or 2     Comment: Social     Drug use: No       Exam;    General; well-developed well-nourished adult female  Neck;   no JVD  Chest;  CTA  CVS;  RRR  Abdomen; soft nontender  Musculoskeletal;    Patient has predictable pain both medial and lateral compartments of the left knee she has no significant effusion of the left knee  There is no overlying erythema or bruising  She does have significant discomfort with weight-bearing as well as with flexion and extension  Range of motion of her hip hip flexion is done without pain however internal and external rotation of her left hip offer her modest discomfort in the lateral hip and posterior lateral buttock    Knee x-rays; tricompartmental osteoarthritis left knee    Left hip x-rays; Impression;     Left knee osteoarthritis  Left knee chronic pain  History greater trochanteric bursitis left hip      Plan;

## 2019-10-08 ENCOUNTER — TELEPHONE (OUTPATIENT)
Dept: OBGYN CLINIC | Facility: HOSPITAL | Age: 74
End: 2019-10-08

## 2019-10-09 ENCOUNTER — TRANSCRIBE ORDERS (OUTPATIENT)
Dept: ADMINISTRATIVE | Age: 74
End: 2019-10-09

## 2019-10-09 ENCOUNTER — EVALUATION (OUTPATIENT)
Dept: PHYSICAL THERAPY | Facility: REHABILITATION | Age: 74
End: 2019-10-09
Payer: MEDICARE

## 2019-10-09 ENCOUNTER — APPOINTMENT (OUTPATIENT)
Dept: LAB | Age: 74
End: 2019-10-09
Payer: MEDICARE

## 2019-10-09 ENCOUNTER — LAB (OUTPATIENT)
Dept: LAB | Age: 74
End: 2019-10-09
Payer: MEDICARE

## 2019-10-09 DIAGNOSIS — M17.12 ARTHRITIS OF LEFT KNEE: ICD-10-CM

## 2019-10-09 DIAGNOSIS — Z01.818 OTHER SPECIFIED PRE-OPERATIVE EXAMINATION: ICD-10-CM

## 2019-10-09 DIAGNOSIS — Z01.818 PRE-OP TESTING: Primary | ICD-10-CM

## 2019-10-09 DIAGNOSIS — Z01.818 PRE-OP TESTING: ICD-10-CM

## 2019-10-09 LAB
ABO GROUP BLD: NORMAL
ALBUMIN SERPL BCP-MCNC: 3.8 G/DL (ref 3.5–5)
ALP SERPL-CCNC: 49 U/L (ref 46–116)
ALT SERPL W P-5'-P-CCNC: 25 U/L (ref 12–78)
ANION GAP SERPL CALCULATED.3IONS-SCNC: 4 MMOL/L (ref 4–13)
APTT PPP: 28 SECONDS (ref 23–37)
AST SERPL W P-5'-P-CCNC: 16 U/L (ref 5–45)
ATRIAL RATE: 78 BPM
BASOPHILS # BLD AUTO: 0.04 THOUSANDS/ΜL (ref 0–0.1)
BASOPHILS NFR BLD AUTO: 1 % (ref 0–1)
BILIRUB SERPL-MCNC: 0.53 MG/DL (ref 0.2–1)
BLD GP AB SCN SERPL QL: NEGATIVE
BUN SERPL-MCNC: 13 MG/DL (ref 5–25)
CALCIUM SERPL-MCNC: 9.6 MG/DL (ref 8.3–10.1)
CHLORIDE SERPL-SCNC: 110 MMOL/L (ref 100–108)
CO2 SERPL-SCNC: 26 MMOL/L (ref 21–32)
CREAT SERPL-MCNC: 0.59 MG/DL (ref 0.6–1.3)
CRP SERPL QL: <3 MG/L
EOSINOPHIL # BLD AUTO: 0.16 THOUSAND/ΜL (ref 0–0.61)
EOSINOPHIL NFR BLD AUTO: 2 % (ref 0–6)
ERYTHROCYTE [DISTWIDTH] IN BLOOD BY AUTOMATED COUNT: 12.5 % (ref 11.6–15.1)
EST. AVERAGE GLUCOSE BLD GHB EST-MCNC: 117 MG/DL
FERRITIN SERPL-MCNC: 159 NG/ML (ref 8–388)
GFR SERPL CREATININE-BSD FRML MDRD: 92 ML/MIN/1.73SQ M
GLUCOSE P FAST SERPL-MCNC: 111 MG/DL (ref 65–99)
HBA1C MFR BLD: 5.7 % (ref 4.2–6.3)
HCT VFR BLD AUTO: 43.2 % (ref 34.8–46.1)
HGB BLD-MCNC: 13.8 G/DL (ref 11.5–15.4)
IMM GRANULOCYTES # BLD AUTO: 0.03 THOUSAND/UL (ref 0–0.2)
IMM GRANULOCYTES NFR BLD AUTO: 0 % (ref 0–2)
INR PPP: 0.98 (ref 0.84–1.19)
IRON SATN MFR SERPL: 46 %
IRON SERPL-MCNC: 127 UG/DL (ref 50–170)
LYMPHOCYTES # BLD AUTO: 1.4 THOUSANDS/ΜL (ref 0.6–4.47)
LYMPHOCYTES NFR BLD AUTO: 20 % (ref 14–44)
MCH RBC QN AUTO: 31.4 PG (ref 26.8–34.3)
MCHC RBC AUTO-ENTMCNC: 31.9 G/DL (ref 31.4–37.4)
MCV RBC AUTO: 98 FL (ref 82–98)
MONOCYTES # BLD AUTO: 0.42 THOUSAND/ΜL (ref 0.17–1.22)
MONOCYTES NFR BLD AUTO: 6 % (ref 4–12)
NEUTROPHILS # BLD AUTO: 5.04 THOUSANDS/ΜL (ref 1.85–7.62)
NEUTS SEG NFR BLD AUTO: 71 % (ref 43–75)
NRBC BLD AUTO-RTO: 0 /100 WBCS
P AXIS: 48 DEGREES
PLATELET # BLD AUTO: 206 THOUSANDS/UL (ref 149–390)
PMV BLD AUTO: 11.2 FL (ref 8.9–12.7)
POTASSIUM SERPL-SCNC: 3.9 MMOL/L (ref 3.5–5.3)
PR INTERVAL: 154 MS
PROT SERPL-MCNC: 7.1 G/DL (ref 6.4–8.2)
PROTHROMBIN TIME: 12.6 SECONDS (ref 11.6–14.5)
QRS AXIS: 54 DEGREES
QRSD INTERVAL: 80 MS
QT INTERVAL: 420 MS
QTC INTERVAL: 478 MS
RBC # BLD AUTO: 4.4 MILLION/UL (ref 3.81–5.12)
RH BLD: POSITIVE
SODIUM SERPL-SCNC: 140 MMOL/L (ref 136–145)
SPECIMEN EXPIRATION DATE: NORMAL
T WAVE AXIS: 29 DEGREES
TIBC SERPL-MCNC: 274 UG/DL (ref 250–450)
VENTRICULAR RATE: 78 BPM
WBC # BLD AUTO: 7.09 THOUSAND/UL (ref 4.31–10.16)

## 2019-10-09 PROCEDURE — 82728 ASSAY OF FERRITIN: CPT

## 2019-10-09 PROCEDURE — 85025 COMPLETE CBC W/AUTO DIFF WBC: CPT

## 2019-10-09 PROCEDURE — 83550 IRON BINDING TEST: CPT

## 2019-10-09 PROCEDURE — 83540 ASSAY OF IRON: CPT

## 2019-10-09 PROCEDURE — 97110 THERAPEUTIC EXERCISES: CPT | Performed by: PHYSICAL THERAPIST

## 2019-10-09 PROCEDURE — 36415 COLL VENOUS BLD VENIPUNCTURE: CPT

## 2019-10-09 PROCEDURE — 86900 BLOOD TYPING SEROLOGIC ABO: CPT

## 2019-10-09 PROCEDURE — 86901 BLOOD TYPING SEROLOGIC RH(D): CPT

## 2019-10-09 PROCEDURE — 83036 HEMOGLOBIN GLYCOSYLATED A1C: CPT

## 2019-10-09 PROCEDURE — 97161 PT EVAL LOW COMPLEX 20 MIN: CPT | Performed by: PHYSICAL THERAPIST

## 2019-10-09 PROCEDURE — 93005 ELECTROCARDIOGRAM TRACING: CPT

## 2019-10-09 PROCEDURE — 93010 ELECTROCARDIOGRAM REPORT: CPT | Performed by: INTERNAL MEDICINE

## 2019-10-09 PROCEDURE — 80053 COMPREHEN METABOLIC PANEL: CPT

## 2019-10-09 PROCEDURE — 86140 C-REACTIVE PROTEIN: CPT

## 2019-10-09 PROCEDURE — 85610 PROTHROMBIN TIME: CPT

## 2019-10-09 PROCEDURE — 86850 RBC ANTIBODY SCREEN: CPT

## 2019-10-09 PROCEDURE — 85730 THROMBOPLASTIN TIME PARTIAL: CPT

## 2019-10-09 NOTE — PROGRESS NOTES
PT Evaluation     Today's date: 10/9/2019  Patient name: Abigail Hurtado  : 1945  MRN: [id number]  Referring provider: Velvet Romero  Dx:   Encounter Diagnosis     ICD-10-CM    1  Arthritis of left knee M17 12 Ambulatory referral to Physical Therapy                  Assessment  Assessment details: Abigail Hurtado is a 69 y o  female who presents with pain, decreased strength and decreased ROM  Due to these impairments, patient has difficulty performing a/iadls and recreational activities  Patient's clinical presentation is consistent with their referring diagnosis of arthritis of left knee  Patient would benefit from skilled physical therapy to address their aforementioned impairments, improve their level of function and to improve their overall quality of life  Pre-operative PT evaluation for left TKA performed including completion of home preparation checklist, instruction in HEP, review of expectations post-operatively for TKA, and functional assessment  Impairments: abnormal or restricted ROM, impaired physical strength and pain with function    Symptom irritability: highUnderstanding of Dx/Px/POC: good   Prognosis: good    Goals  Short term goals - to be achieved in 4 weeks:     Decrease pain 20-50%  Increase strength by 1/2 grade  Improve range of motion by 25%  Long term goals - to be achieved by discharge:    Ambulation is improved to maximal level of function  Squatting is improved to maximal level of function  Stair climbing is improved to maximal level of function  IADL performance in related activities is improved to maximal level of function  Performance in related household activities is improved to maximal level of function       Plan  Planned therapy interventions: manual therapy, neuromuscular re-education, patient education, strengthening, stretching, therapeutic activities, therapeutic exercise, home exercise program and gait training  Treatment plan discussed with: patient        Subjective Evaluation    History of Present Illness  Mechanism of injury: Patient refers to PT for pre-op evaluation for left TKA scheduled for 10/24/19  Patient states chronic knee pain for approximately 10 years which has increased in the past six months of insidious onset  Patient is retired  Patient lives alone in a ranch home with one step to enter without a railing  Patient states she has a shower seat for her bathtub and a walk in shower  Patient states she has a commode at her home  Patient states she will be staying with her daughter who lives in [place] after the TKA until she is able to resume driving activity     Pain  Current pain ratin  At best pain ratin  At worst pain ratin          Objective     Active Range of Motion   Left Knee   Flexion: 130 degrees   Extension: 5 degrees     Passive Range of Motion   Left Knee   Flexion: 135 degrees   Extension: 2 degrees     Strength/Myotome Testing     Left Hip   Planes of Motion   Flexion: 3+ (pain)  Extension: 4-  Abduction: 4-  Adduction: 4-    Left Knee   Flexion: 4 (pain)  Extension: 4 (pain)    Functional Assessment        Comments  TU 2 seconds without assistive device  Tandem stance: Loss of balance 2 seconds  Single limb stance: Less than 2 seconds bilaterally  Home preparation checklist completed with patient  Patient instructed in stair mechanics for ambulation S/P TKA             Precautions:  Left TKA scheduled for 10/24/19      Manual  10/9            Left knee PROM                                                                     Exercise Diary  10/9            Bike - ROM             Mini squats             Step ups             Heel raises             Toe raises             LAQ             SAQ             Heel slides             Quad sets             SLR flexion             SLR abduction Modalities

## 2019-10-10 ENCOUNTER — CONSULT (OUTPATIENT)
Dept: INTERNAL MEDICINE CLINIC | Age: 74
End: 2019-10-10
Payer: MEDICARE

## 2019-10-10 VITALS
WEIGHT: 181 LBS | SYSTOLIC BLOOD PRESSURE: 122 MMHG | OXYGEN SATURATION: 95 % | TEMPERATURE: 97 F | HEIGHT: 64 IN | DIASTOLIC BLOOD PRESSURE: 76 MMHG | HEART RATE: 82 BPM | BODY MASS INDEX: 30.9 KG/M2

## 2019-10-10 DIAGNOSIS — E78.00 HYPERCHOLESTEROLEMIA: ICD-10-CM

## 2019-10-10 DIAGNOSIS — Z12.11 SCREENING FOR COLON CANCER: ICD-10-CM

## 2019-10-10 DIAGNOSIS — M17.12 PRIMARY OSTEOARTHRITIS OF LEFT KNEE: ICD-10-CM

## 2019-10-10 DIAGNOSIS — Z01.818 PRE-OP EXAMINATION: Primary | ICD-10-CM

## 2019-10-10 DIAGNOSIS — F32.89 OTHER DEPRESSION: ICD-10-CM

## 2019-10-10 PROCEDURE — 99214 OFFICE O/P EST MOD 30 MIN: CPT | Performed by: PHYSICIAN ASSISTANT

## 2019-10-14 ENCOUNTER — ANESTHESIA EVENT (OUTPATIENT)
Dept: PERIOP | Facility: HOSPITAL | Age: 74
End: 2019-10-14
Payer: MEDICARE

## 2019-10-14 NOTE — PRE-PROCEDURE INSTRUCTIONS
Pre-Surgery Instructions:   Medication Instructions    ALPRAZolam (XANAX) 0 5 mg tablet Instructed patient per Anesthesia Guidelines   ascorbic acid (VITAMIN C) 500 mg tablet Instructed patient per Anesthesia Guidelines   B Complex Vitamins (B COMPLEX PO) Instructed patient per Anesthesia Guidelines   CALCIUM PO Instructed patient per Anesthesia Guidelines   Cholecalciferol (VITAMIN D3 PO) Instructed patient per Anesthesia Guidelines   ferrous sulfate 324 (65 Fe) mg Instructed patient per Anesthesia Guidelines   folic acid (FOLVITE) 1 mg tablet Instructed patient per Anesthesia Guidelines   Ibuprofen-Diphenhydramine Cit 200-38 MG TABS Instructed patient per Anesthesia Guidelines   sertraline (ZOLOFT) 100 mg tablet Instructed patient per Anesthesia Guidelines   simvastatin (ZOCOR) 20 mg tablet Instructed patient per Anesthesia Guidelines  Spoke to pt  Medication list reviewed & instructed   As of 68/33 pt taking folic acid, vit C, iron and will continue until 10/23  As of 10/17 pt to stop ibuprofen, b complex, calcium, vit D  Instructed on tylenol only   Pt takes zoloft & zocor @ hs  Am DOS no meds   Showering instructions soap and cloths given @ time of appt  All instructed  IS given and demonstrated   Patient picking up lovenox from pharmacy today  Aware for post op use only  All instructions verbally understood by patient  No further questions  Callback number given  Antidepressant Med Class     Continue to take this medication on your normal schedule  If this is an oral medication and you take it in the morning, then you may take this medicine with a sip of water  Benzodiazepine antagonist Med Class     If this medication is needed please continue to take on your normal schedule  If you take it in the morning, then you may take this medicine with a sip of water  Statin Med Class     Continue to take this medication on your normal schedule    If this is an oral medication and you take it in the morning, then you may take this medicine with a sip of water  Vitamin Med Class     You may continue to take any vitamin that your surgeon has prescribed to you up to the day before surgery  If your surgeon has not specifically prescribed this vitamin or instructed you to continue then stop taking 7 days prior to surgery

## 2019-10-15 NOTE — TELEPHONE ENCOUNTER
Preoperative Elective Admission Assessment       Living Situation: Pt reports living at home, Alone  Pt planning to stay at daughter, [place] home in [place] post op  Home Layout: Pt reports her home is a ranch with   Pt's daughters home is a multilevel with first floor walk in shower                    Steps: At daughters side entrance has 1 step, 4 to enter from front  First Floor Setup: Yes at daughters, and pt's home when she returns is a Ranch    Post-op Caregiver: Daughter, Guerrero Dominguez Transport: [facility]    [facility]    DME: Pt reports having a cane, RW, BSC     Patient's Current Level of Function: Independent with ADLS and Ambulation    Medication Management: Pt reports self managing medications using a daily pillbox                      Preferred Pharmacy: [facility]                    Blood Management Vitamins: Pt reports taking folic acid, vitamin C and Iron                     Post-op anticoagulant: Lovenox at pharmacy, pt aware    DC Plan: Home with outpatient PT                     Barriers to DC identified preoperatively:     BMI: 31 24    [employer]: Enrolled on 10/8                    RAPT: 9                    ACE/ARB Form:  GFR>60                    HOOS/KOOS: 47 [phone]    Patient Education: Pt educated on post op pain, early mobilization (POD0), indication/use of incentive spirometer (10x/hr while awake), and indication for/use of foot/leg pumps  pt encouraged to call me with questions, concerns or issues

## 2019-10-24 ENCOUNTER — ANESTHESIA (OUTPATIENT)
Dept: PERIOP | Facility: HOSPITAL | Age: 74
End: 2019-10-24
Payer: MEDICARE

## 2019-10-24 ENCOUNTER — HOSPITAL ENCOUNTER (OUTPATIENT)
Facility: HOSPITAL | Age: 74
Setting detail: OUTPATIENT SURGERY
Discharge: HOME | End: 2019-10-25
Attending: ORTHOPAEDIC SURGERY | Admitting: ORTHOPAEDIC SURGERY
Payer: MEDICARE

## 2019-10-24 DIAGNOSIS — Z96.652 S/P TKR (TOTAL KNEE REPLACEMENT), LEFT: Primary | ICD-10-CM

## 2019-10-24 DIAGNOSIS — Z01.818 PRE-OP TESTING: ICD-10-CM

## 2019-10-24 DIAGNOSIS — M17.12 ARTHRITIS OF LEFT KNEE: ICD-10-CM

## 2019-10-24 PROCEDURE — C1776 JOINT DEVICE (IMPLANTABLE): HCPCS | Performed by: ORTHOPAEDIC SURGERY

## 2019-10-24 PROCEDURE — G8979 MOBILITY GOAL STATUS: HCPCS

## 2019-10-24 PROCEDURE — G8978 MOBILITY CURRENT STATUS: HCPCS

## 2019-10-24 PROCEDURE — 99024 POSTOP FOLLOW-UP VISIT: CPT | Performed by: ORTHOPAEDIC SURGERY

## 2019-10-24 PROCEDURE — C1713 ANCHOR/SCREW BN/BN,TIS/BN: HCPCS | Performed by: ORTHOPAEDIC SURGERY

## 2019-10-24 PROCEDURE — 27447 TOTAL KNEE ARTHROPLASTY: CPT | Performed by: ORTHOPAEDIC SURGERY

## 2019-10-24 PROCEDURE — 97163 PT EVAL HIGH COMPLEX 45 MIN: CPT

## 2019-10-24 DEVICE — ATTUNE KNEE SYSTEM TIBIAL BASE FIXED BEARING SIZE 5 CEMENTED
Type: IMPLANTABLE DEVICE | Site: KNEE | Status: FUNCTIONAL
Brand: ATTUNE

## 2019-10-24 DEVICE — ATTUNE KNEE SYSTEM FEMORAL POSTERIOR STABILIZED NARROW SIZE 5N LEFT CEMENTED
Type: IMPLANTABLE DEVICE | Site: KNEE | Status: FUNCTIONAL
Brand: ATTUNE

## 2019-10-24 DEVICE — ATTUNE KNEE SYSTEM TIBIAL INSERT FIXED BEARING POSTERIOR STABILIZED 5 8MM AOX
Type: IMPLANTABLE DEVICE | Site: KNEE | Status: FUNCTIONAL
Brand: ATTUNE

## 2019-10-24 DEVICE — SMARTSET HV HIGH VISCOSITY BONE CEMENT 40G
Type: IMPLANTABLE DEVICE | Site: KNEE | Status: FUNCTIONAL
Brand: SMARTSET

## 2019-10-24 DEVICE — ATTUNE PATELLA MEDIALIZED DOME 32MM CEMENTED AOX
Type: IMPLANTABLE DEVICE | Site: KNEE | Status: FUNCTIONAL
Brand: ATTUNE

## 2019-10-24 RX ORDER — SERTRALINE HYDROCHLORIDE 100 MG/1
100 TABLET, FILM COATED ORAL
Status: DISCONTINUED | OUTPATIENT
Start: 2019-10-24 | End: 2019-10-25 | Stop reason: HOSPADM

## 2019-10-24 RX ORDER — SENNOSIDES 8.6 MG
1 TABLET ORAL DAILY
Status: DISCONTINUED | OUTPATIENT
Start: 2019-10-25 | End: 2019-10-25 | Stop reason: HOSPADM

## 2019-10-24 RX ORDER — DEXAMETHASONE SODIUM PHOSPHATE 10 MG/ML
INJECTION, SOLUTION INTRAMUSCULAR; INTRAVENOUS AS NEEDED
Status: DISCONTINUED | OUTPATIENT
Start: 2019-10-24 | End: 2019-10-24 | Stop reason: SURG

## 2019-10-24 RX ORDER — SODIUM CHLORIDE, SODIUM LACTATE, POTASSIUM CHLORIDE, CALCIUM CHLORIDE 600; 310; 30; 20 MG/100ML; MG/100ML; MG/100ML; MG/100ML
125 INJECTION, SOLUTION INTRAVENOUS CONTINUOUS
Status: DISCONTINUED | OUTPATIENT
Start: 2019-10-24 | End: 2019-10-24 | Stop reason: SDUPTHER

## 2019-10-24 RX ORDER — OXYCODONE HYDROCHLORIDE 5 MG/1
10 TABLET ORAL EVERY 4 HOURS PRN
Status: DISCONTINUED | OUTPATIENT
Start: 2019-10-24 | End: 2019-10-25 | Stop reason: HOSPADM

## 2019-10-24 RX ORDER — PROPOFOL 10 MG/ML
INJECTION, EMULSION INTRAVENOUS AS NEEDED
Status: DISCONTINUED | OUTPATIENT
Start: 2019-10-24 | End: 2019-10-24 | Stop reason: SURG

## 2019-10-24 RX ORDER — ACETAMINOPHEN 325 MG/1
975 TABLET ORAL ONCE
Status: COMPLETED | OUTPATIENT
Start: 2019-10-24 | End: 2019-10-24

## 2019-10-24 RX ORDER — TRAMADOL HYDROCHLORIDE 50 MG/1
50 TABLET ORAL EVERY 6 HOURS PRN
Qty: 60 TABLET | Refills: 0 | Status: SHIPPED | OUTPATIENT
Start: 2019-10-24 | End: 2019-11-06

## 2019-10-24 RX ORDER — DOCUSATE SODIUM 100 MG/1
100 CAPSULE, LIQUID FILLED ORAL 2 TIMES DAILY
Status: DISCONTINUED | OUTPATIENT
Start: 2019-10-24 | End: 2019-10-25 | Stop reason: HOSPADM

## 2019-10-24 RX ORDER — MIDAZOLAM HYDROCHLORIDE 1 MG/ML
INJECTION INTRAMUSCULAR; INTRAVENOUS AS NEEDED
Status: DISCONTINUED | OUTPATIENT
Start: 2019-10-24 | End: 2019-10-24 | Stop reason: SURG

## 2019-10-24 RX ORDER — MAGNESIUM HYDROXIDE 1200 MG/15ML
LIQUID ORAL AS NEEDED
Status: DISCONTINUED | OUTPATIENT
Start: 2019-10-24 | End: 2019-10-24 | Stop reason: HOSPADM

## 2019-10-24 RX ORDER — CALCIUM CARBONATE 200(500)MG
1000 TABLET,CHEWABLE ORAL DAILY PRN
Status: DISCONTINUED | OUTPATIENT
Start: 2019-10-24 | End: 2019-10-25 | Stop reason: HOSPADM

## 2019-10-24 RX ORDER — PRAVASTATIN SODIUM 40 MG
40 TABLET ORAL
Status: DISCONTINUED | OUTPATIENT
Start: 2019-10-24 | End: 2019-10-25 | Stop reason: HOSPADM

## 2019-10-24 RX ORDER — SODIUM CHLORIDE, SODIUM LACTATE, POTASSIUM CHLORIDE, CALCIUM CHLORIDE 600; 310; 30; 20 MG/100ML; MG/100ML; MG/100ML; MG/100ML
125 INJECTION, SOLUTION INTRAVENOUS CONTINUOUS
Status: DISCONTINUED | OUTPATIENT
Start: 2019-10-24 | End: 2019-10-24

## 2019-10-24 RX ORDER — FENTANYL CITRATE 50 UG/ML
INJECTION, SOLUTION INTRAMUSCULAR; INTRAVENOUS AS NEEDED
Status: DISCONTINUED | OUTPATIENT
Start: 2019-10-24 | End: 2019-10-24 | Stop reason: SURG

## 2019-10-24 RX ORDER — CHLORHEXIDINE GLUCONATE 0.12 MG/ML
15 RINSE ORAL ONCE
Status: COMPLETED | OUTPATIENT
Start: 2019-10-24 | End: 2019-10-24

## 2019-10-24 RX ORDER — HYDROMORPHONE HCL/PF 1 MG/ML
0.2 SYRINGE (ML) INJECTION
Status: COMPLETED | OUTPATIENT
Start: 2019-10-24 | End: 2019-10-24

## 2019-10-24 RX ORDER — CEFAZOLIN SODIUM 1 G/50ML
1000 SOLUTION INTRAVENOUS EVERY 8 HOURS
Status: COMPLETED | OUTPATIENT
Start: 2019-10-24 | End: 2019-10-25

## 2019-10-24 RX ORDER — SODIUM CHLORIDE, SODIUM LACTATE, POTASSIUM CHLORIDE, CALCIUM CHLORIDE 600; 310; 30; 20 MG/100ML; MG/100ML; MG/100ML; MG/100ML
20 INJECTION, SOLUTION INTRAVENOUS CONTINUOUS
Status: DISCONTINUED | OUTPATIENT
Start: 2019-10-24 | End: 2019-10-24

## 2019-10-24 RX ORDER — GABAPENTIN 300 MG/1
300 CAPSULE ORAL ONCE
Status: COMPLETED | OUTPATIENT
Start: 2019-10-24 | End: 2019-10-24

## 2019-10-24 RX ORDER — SODIUM CHLORIDE, SODIUM LACTATE, POTASSIUM CHLORIDE, CALCIUM CHLORIDE 600; 310; 30; 20 MG/100ML; MG/100ML; MG/100ML; MG/100ML
1.5 INJECTION, SOLUTION INTRAVENOUS CONTINUOUS
Status: DISCONTINUED | OUTPATIENT
Start: 2019-10-24 | End: 2019-10-25 | Stop reason: HOSPADM

## 2019-10-24 RX ORDER — ONDANSETRON 2 MG/ML
INJECTION INTRAMUSCULAR; INTRAVENOUS AS NEEDED
Status: DISCONTINUED | OUTPATIENT
Start: 2019-10-24 | End: 2019-10-24 | Stop reason: SURG

## 2019-10-24 RX ORDER — ROPIVACAINE HYDROCHLORIDE 5 MG/ML
INJECTION, SOLUTION EPIDURAL; INFILTRATION; PERINEURAL AS NEEDED
Status: DISCONTINUED | OUTPATIENT
Start: 2019-10-24 | End: 2019-10-24 | Stop reason: SURG

## 2019-10-24 RX ORDER — OXYCODONE HYDROCHLORIDE 5 MG/1
5 TABLET ORAL EVERY 4 HOURS PRN
Status: DISCONTINUED | OUTPATIENT
Start: 2019-10-24 | End: 2019-10-25 | Stop reason: HOSPADM

## 2019-10-24 RX ORDER — LIDOCAINE HYDROCHLORIDE 10 MG/ML
INJECTION, SOLUTION INFILTRATION; PERINEURAL AS NEEDED
Status: DISCONTINUED | OUTPATIENT
Start: 2019-10-24 | End: 2019-10-24 | Stop reason: SURG

## 2019-10-24 RX ORDER — ONDANSETRON 2 MG/ML
4 INJECTION INTRAMUSCULAR; INTRAVENOUS ONCE AS NEEDED
Status: DISCONTINUED | OUTPATIENT
Start: 2019-10-24 | End: 2019-10-24 | Stop reason: HOSPADM

## 2019-10-24 RX ORDER — HYDROMORPHONE HCL/PF 1 MG/ML
0.5 SYRINGE (ML) INJECTION EVERY 2 HOUR PRN
Status: DISCONTINUED | OUTPATIENT
Start: 2019-10-24 | End: 2019-10-25 | Stop reason: HOSPADM

## 2019-10-24 RX ORDER — CEFAZOLIN SODIUM 2 G/50ML
2000 SOLUTION INTRAVENOUS ONCE
Status: COMPLETED | OUTPATIENT
Start: 2019-10-24 | End: 2019-10-24

## 2019-10-24 RX ORDER — OXYCODONE HYDROCHLORIDE 5 MG/1
TABLET ORAL
Qty: 30 TABLET | Refills: 0 | Status: SHIPPED | OUTPATIENT
Start: 2019-10-24 | End: 2020-01-17

## 2019-10-24 RX ORDER — ACETAMINOPHEN 325 MG/1
975 TABLET ORAL EVERY 8 HOURS
Status: DISCONTINUED | OUTPATIENT
Start: 2019-10-24 | End: 2019-10-25 | Stop reason: HOSPADM

## 2019-10-24 RX ORDER — ALPRAZOLAM 0.5 MG/1
0.5 TABLET ORAL DAILY PRN
Status: DISCONTINUED | OUTPATIENT
Start: 2019-10-24 | End: 2019-10-25 | Stop reason: HOSPADM

## 2019-10-24 RX ADMIN — FENTANYL CITRATE 25 MCG: 50 INJECTION, SOLUTION INTRAMUSCULAR; INTRAVENOUS at 08:23

## 2019-10-24 RX ADMIN — ACETAMINOPHEN 975 MG: 325 TABLET ORAL at 13:46

## 2019-10-24 RX ADMIN — SODIUM CHLORIDE, SODIUM LACTATE, POTASSIUM CHLORIDE, AND CALCIUM CHLORIDE 1.5 ML/KG/HR: .6; .31; .03; .02 INJECTION, SOLUTION INTRAVENOUS at 17:26

## 2019-10-24 RX ADMIN — HYDROMORPHONE HYDROCHLORIDE 0.2 MG: 1 INJECTION, SOLUTION INTRAMUSCULAR; INTRAVENOUS; SUBCUTANEOUS at 10:16

## 2019-10-24 RX ADMIN — HYDROMORPHONE HYDROCHLORIDE 0.2 MG: 1 INJECTION, SOLUTION INTRAMUSCULAR; INTRAVENOUS; SUBCUTANEOUS at 10:52

## 2019-10-24 RX ADMIN — ONDANSETRON 4 MG: 2 INJECTION INTRAMUSCULAR; INTRAVENOUS at 08:40

## 2019-10-24 RX ADMIN — SODIUM CHLORIDE, SODIUM LACTATE, POTASSIUM CHLORIDE, AND CALCIUM CHLORIDE: .6; .31; .03; .02 INJECTION, SOLUTION INTRAVENOUS at 07:19

## 2019-10-24 RX ADMIN — MIDAZOLAM 1 MG: 1 INJECTION INTRAMUSCULAR; INTRAVENOUS at 07:58

## 2019-10-24 RX ADMIN — ACETAMINOPHEN 975 MG: 325 TABLET ORAL at 21:18

## 2019-10-24 RX ADMIN — PRAVASTATIN SODIUM 40 MG: 40 TABLET ORAL at 17:26

## 2019-10-24 RX ADMIN — CEFAZOLIN SODIUM 2000 MG: 2 SOLUTION INTRAVENOUS at 07:45

## 2019-10-24 RX ADMIN — ENOXAPARIN SODIUM 40 MG: 40 INJECTION SUBCUTANEOUS at 21:18

## 2019-10-24 RX ADMIN — HYDROMORPHONE HYDROCHLORIDE 0.2 MG: 1 INJECTION, SOLUTION INTRAMUSCULAR; INTRAVENOUS; SUBCUTANEOUS at 10:00

## 2019-10-24 RX ADMIN — ACETAMINOPHEN 975 MG: 325 TABLET ORAL at 06:01

## 2019-10-24 RX ADMIN — FENTANYL CITRATE 25 MCG: 50 INJECTION, SOLUTION INTRAMUSCULAR; INTRAVENOUS at 07:30

## 2019-10-24 RX ADMIN — IRON SUCROSE 300 MG: 20 INJECTION, SOLUTION INTRAVENOUS at 15:28

## 2019-10-24 RX ADMIN — HYDROMORPHONE HYDROCHLORIDE 0.2 MG: 1 INJECTION, SOLUTION INTRAMUSCULAR; INTRAVENOUS; SUBCUTANEOUS at 10:10

## 2019-10-24 RX ADMIN — GABAPENTIN 300 MG: 300 CAPSULE ORAL at 06:02

## 2019-10-24 RX ADMIN — OXYCODONE HYDROCHLORIDE 10 MG: 10 TABLET ORAL at 10:56

## 2019-10-24 RX ADMIN — PHENYLEPHRINE HYDROCHLORIDE 100 MCG: 10 INJECTION INTRAVENOUS at 09:20

## 2019-10-24 RX ADMIN — FENTANYL CITRATE 50 MCG: 50 INJECTION, SOLUTION INTRAMUSCULAR; INTRAVENOUS at 08:20

## 2019-10-24 RX ADMIN — CEFAZOLIN SODIUM 1000 MG: 1 SOLUTION INTRAVENOUS at 17:54

## 2019-10-24 RX ADMIN — MIDAZOLAM 1 MG: 1 INJECTION INTRAMUSCULAR; INTRAVENOUS at 07:30

## 2019-10-24 RX ADMIN — OXYCODONE HYDROCHLORIDE 10 MG: 10 TABLET ORAL at 21:18

## 2019-10-24 RX ADMIN — DOCUSATE SODIUM 100 MG: 100 CAPSULE, LIQUID FILLED ORAL at 17:26

## 2019-10-24 RX ADMIN — PROPOFOL 150 MG: 10 INJECTION, EMULSION INTRAVENOUS at 07:58

## 2019-10-24 RX ADMIN — SODIUM CHLORIDE, SODIUM LACTATE, POTASSIUM CHLORIDE, AND CALCIUM CHLORIDE 1.5 ML/KG/HR: .6; .31; .03; .02 INJECTION, SOLUTION INTRAVENOUS at 11:00

## 2019-10-24 RX ADMIN — HYDROMORPHONE HYDROCHLORIDE 0.2 MG: 1 INJECTION, SOLUTION INTRAMUSCULAR; INTRAVENOUS; SUBCUTANEOUS at 10:05

## 2019-10-24 RX ADMIN — DEXAMETHASONE SODIUM PHOSPHATE 10 MG: 10 INJECTION, SOLUTION INTRAMUSCULAR; INTRAVENOUS at 07:32

## 2019-10-24 RX ADMIN — ROPIVACAINE HYDROCHLORIDE 30 ML: 5 INJECTION, SOLUTION EPIDURAL; INFILTRATION; PERINEURAL at 07:32

## 2019-10-24 RX ADMIN — SERTRALINE HYDROCHLORIDE 100 MG: 100 TABLET ORAL at 21:19

## 2019-10-24 RX ADMIN — LIDOCAINE HYDROCHLORIDE 30 MG: 10 INJECTION, SOLUTION INFILTRATION; PERINEURAL at 07:58

## 2019-10-24 RX ADMIN — PHENYLEPHRINE HYDROCHLORIDE 100 MCG: 10 INJECTION INTRAVENOUS at 08:38

## 2019-10-24 RX ADMIN — TRANEXAMIC ACID 1000 MG: 1 INJECTION, SOLUTION INTRAVENOUS at 08:07

## 2019-10-24 RX ADMIN — PHENYLEPHRINE HYDROCHLORIDE 200 MCG: 10 INJECTION INTRAVENOUS at 09:23

## 2019-10-24 RX ADMIN — PHENYLEPHRINE HYDROCHLORIDE 100 MCG: 10 INJECTION INTRAVENOUS at 08:08

## 2019-10-24 RX ADMIN — PROPOFOL 50 MG: 10 INJECTION, EMULSION INTRAVENOUS at 07:59

## 2019-10-24 RX ADMIN — PHENYLEPHRINE HYDROCHLORIDE 200 MCG: 10 INJECTION INTRAVENOUS at 08:11

## 2019-10-24 RX ADMIN — CHLORHEXIDINE GLUCONATE 0.12% ORAL RINSE 15 ML: 1.2 LIQUID ORAL at 06:01

## 2019-10-24 NOTE — INTERVAL H&P NOTE
H&P reviewed  After examining the patient I find no changes in the patients condition since the H&P had been written      Vitals:    10/24/19 0536   BP: 123/73   Pulse: 93   Resp: 20   Temp: 98 1 °F (36 7 °C)   SpO2: 96%     Preop for left total knee arthroplasty

## 2019-10-24 NOTE — OP NOTE
OPERATIVE REPORT  PATIENT NAME: Jenifer Parker    :  1945  MRN: [id number]  Pt Location: [facility] OR ROOM 04    SURGERY DATE: 10/24/2019    Surgeon(s) and Role:     * Ling Washington MD - Primary     * Algis Cowden, PA-C - Assisting     * Anthony Lopez MD - Assisting    Preop Diagnosis:  Arthritis of left knee [M17 12]  Pain in left leg [M79 605]    Post-Op Diagnosis Codes:     * Arthritis of left knee [M17 12]     * Pain in left leg [M79 605]    Procedure(s) (LRB):  ARTHROPLASTY KNEE TOTAL (Left)    Specimen(s):  * No specimens in log *    Estimated Blood Loss:   100 mL    Drains:  Closed/Suction Drain Left Knee Accordion 10 Fr  (Active)   Number of days: 0       Urethral Catheter Latex 16 Fr  (Active)   Number of days: 0       Anesthesia Type:   Choice    Operative Indications:  Arthritis of left knee [M17 12]  Pain in left leg [M79 605]      Operative Findings:  depuy attune   Femur-5N   Poly-8   Tibia-5   [id number]    Complications:   None    Procedure and Technique: Following induction of adequate level of general anesthesia, Jackson catheters and sterilely introduced this patient's bladder  Antibiotics were administered  The left thigh was then fitted a thigh-high tourniquet  The left lower extremity then underwent sterile prep and drape  The left lower extremity was exsanguinated gravity, the tourniquet inflated throughout mmHg  A midline knee incision was created the knee in flexion  Full-thickness flaps raised in order to access the extensor mechanism  A medial arthrotomy was created open up the knee joint  Bony soft tissue releases were performed where necessary  The distal femoral cut was made 6° valgus  This is made of a long intramedullary waylon  The proximal tibia cut was made next  As this was a varus knee, 2 mm reference medially  Care was taken in order to protect the integrity medial collateral, lateral collateral, posterior structures during these maneuvers    The distal femoral sizing guide was used, the appropriate 4 in 1 cutting blocks impacted  The anterior, posterior, chamfer cuts then made  The box cut was made for the posterior stabilized unit  Flexion extension gaps were checked at this point time noted be well balanced with a 7 mm insert  The patella was then resurfaced will utilize the manufacture's equipment, found to be a size 32 mm button  The trial components removed and the knee was prepared for insertion of cemented components  The cemented tibia, cemented femur, trial poly, cemented patella placed  Excess cement was removed, the knee was brought into extension  The cement was allowed to cure  The trial poly was taken out, the knee was packed off  The tourniquet was deflated, hemostasis was secured  The insert polyethylene was then snapped into position  The knee was taken through a final range of motion, found to be capable full extension, good flexion, no mid flexion valgus instability, and excellent patellar tracking  Satisfied with the extent of surgery, the wounds then flushed with saline closed  A Betadine soak was initiated  A drain was placed deep brought out via separate lateral stab incision  The arthrotomy was closed number Vicryl suture  The subcu tissue closed 2 Vicryl suture  The skin was closed staples  Sterile dressings were applied  The drains constituted    She was awakened from general anesthesia, taken recovery room stable condition plans to include physical therapy weight-bearing to tolerance, she will require DVT prophylaxis with Lovenox   I was present for the entire procedure    Patient Disposition:  PACU     SIGNATURE: Pawel Ryan MD  DATE: October 24, 2019  TIME: 9:35 AM

## 2019-10-24 NOTE — PLAN OF CARE
Problem: PAIN - ADULT  Goal: Verbalizes/displays adequate comfort level or baseline comfort level  Description  Interventions:  - Encourage patient to monitor pain and request assistance  - Assess pain using appropriate pain scale  - Administer analgesics based on type and severity of pain and evaluate response  - Implement non-pharmacological measures as appropriate and evaluate response  - Consider cultural and social influences on pain and pain management  - Notify physician/advanced practitioner if interventions unsuccessful or patient reports new pain  Outcome: Progressing     Problem: INFECTION - ADULT  Goal: Absence or prevention of progression during hospitalization  Description  INTERVENTIONS:  - Assess and monitor for signs and symptoms of infection  - Monitor lab/diagnostic results  - Monitor all insertion sites, i e  indwelling lines, tubes, and drains  - Monitor endotracheal if appropriate and nasal secretions for changes in amount and color  - [place] appropriate cooling/warming therapies per order  - Administer medications as ordered  - Instruct and encourage patient and family to use good hand hygiene technique  - Identify and instruct in appropriate isolation precautions for identified infection/condition  Outcome: Progressing  Goal: Absence of fever/infection during neutropenic period  Description  INTERVENTIONS:  - Monitor WBC    Outcome: Progressing     Problem: SAFETY ADULT  Goal: Patient will remain free of falls  Description  INTERVENTIONS:  - Assess patient frequently for physical needs  -  Identify cognitive and physical deficits and behaviors that affect risk of falls    -  [place] fall precautions as indicated by assessment   - Educate patient/family on patient safety including physical limitations  - Instruct patient to call for assistance with activity based on assessment  - Modify environment to reduce risk of injury  - Consider OT/PT consult to assist with strengthening/mobility  Outcome: Progressing  Goal: Maintain or return to baseline ADL function  Description  INTERVENTIONS:  -  Assess patient's ability to carry out ADLs; assess patient's baseline for ADL function and identify physical deficits which impact ability to perform ADLs (bathing, care of mouth/teeth, toileting, grooming, dressing, etc )  - Assess/evaluate cause of self-care deficits   - Assess range of motion  - Assess patient's mobility; develop plan if impaired  - Assess patient's need for assistive devices and provide as appropriate  - Encourage maximum independence but intervene and supervise when necessary  - Involve family in performance of ADLs  - Assess for home care needs following discharge   - Consider OT consult to assist with ADL evaluation and planning for discharge  - Provide patient education as appropriate  Outcome: Progressing  Goal: Maintain or return mobility status to optimal level  Description  INTERVENTIONS:  - Assess patient's baseline mobility status (ambulation, transfers, stairs, etc )    - Identify cognitive and physical deficits and behaviors that affect mobility  - Identify mobility aids required to assist with transfers and/or ambulation (gait belt, sit-to-stand, lift, walker, cane, etc )  - [place] fall precautions as indicated by assessment  - Record patient progress and toleration of activity level on Mobility SBAR; progress patient to next Phase/Stage  - Instruct patient to call for assistance with activity based on assessment  - Consider rehabilitation consult to assist with strengthening/weightbearing, etc   Outcome: Progressing     Problem: DISCHARGE PLANNING  Goal: Discharge to home or other facility with appropriate resources  Description  INTERVENTIONS:  - Identify barriers to discharge w/patient and caregiver  - Arrange for needed discharge resources and transportation as appropriate  - Identify discharge learning needs (meds, wound care, etc )  - Arrange for interpretive services to assist at discharge as needed  - Refer to Case Management Department for coordinating discharge planning if the patient needs post-hospital services based on physician/advanced practitioner order or complex needs related to functional status, cognitive ability, or social support system  Outcome: Progressing     Problem: Knowledge Deficit  Goal: Patient/family/caregiver demonstrates understanding of disease process, treatment plan, medications, and discharge instructions  Description  Complete learning assessment and assess knowledge base    Interventions:  - Provide teaching at level of understanding  - Provide teaching via preferred learning methods  Outcome: Progressing

## 2019-10-24 NOTE — CONSULTS
Office Visit     10/2/2019  [address] [place] [address] Specialists Mehdi Jones MD   Orthopedic Surgery   Pain in left leg +1 more   Dx   Left Knee - Follow-up   Reason for Visit    Progress Notes   Zakiya Kumar PA-C (Physician Assistant) [facility]   Cosigned by: Rosario Jones MD at 10/2/2019  5:45 PM   Attestation signed by Rosario Jones MD at 10/2/2019 5:45 PM   Patient was seen and examined today  Case was reviewed with the physician assistant  I agree the history, exam, assessment and plan as documented by the physician assistant  80-year-old female with arthritis left knee that remained symptomatic despite recent injections corticosteroid and viscosupplementation  She describes life altering pain in left knee joint  She has pain level joint, the pain is made worse bearing weight, the pain increases with increased activities  Examination confirms antalgic gait pattern  Left hip is good mobility  Left thigh has no atrophy  Left knee is not effused  It is in varus  There is bony enlargement tendons medially  There is crepitation flexion extension  There is no palpable warmth the synovium  Calf compartments are soft and supple  Toes on left foot are warm, sensate, mobile  I have personally reviewed x-rays left knee that show bone-on-bone apposition in the medial compartment  I personally reviewed x-rays left hip that were done today my interpretation is as follows: No fracture, dislocation, joint space narrowing seen within the left hip joint  Assessment/plan:  80-year-old female who has osteoarthritis left knee that remained symptomatic despite appropriate nonsurgical means  Treatment options discussed in detail including risks, benefits, alternatives  This point time elective left total knee replacement surgery is indicated  After review of the risks and benefits, consent was that the above-mentioned surgery  No guarantees given    I would welcome the opportunity see this patient back in the office a postoperative patient      Expand All Collapse All    Subjective;     79-year-old adult female patient well known to the practice  She has established osteoarthritic changes of her left knee  She recently had a round of lubricant or Visco supplement injections  She is attempted ADL alterations,   And has practiced generalized wellness  Her intention on arrival to the office today is to discuss elective total knee replacement arthroplasty pertaining to her left knee     In review she has had previous treatment for the soft tissues overlying the greater troch bursa of her left hip  She has discomfort in the lateral hip and lateral buttock with internal and external rotation of the hip therefore x-rays of the left hip were ordered at this time     Medical History        Past Medical History:   Diagnosis Date    Hyperlipidemia      Skin tag       last assessed 17, resolved 10/2/17             Surgical History         Past Surgical History:   Procedure Laterality Date    COLONOSCOPY         Complete, with Dr Meyers, resolved 13    KNEE ARTHROSCOPY Left       resolved     KNEE ARTHROSCOPY W/ MENISCAL REPAIR Left     TONSILLECTOMY AND ADENOIDECTOMY                      Family History   Problem Relation Age of Onset    Stroke Mother           CVA    Hyperlipidemia Mother      Stroke Maternal Grandfather           CVA    Stroke Family           CVA    Other Family           breast disorders    Prostate cancer Father 76    Other Maternal Aunt           breast disorders          Social History            Tobacco Use    Smoking status: Former Smoker       Last attempt to quit:        Years since quittin 7    Smokeless tobacco: Never Used   Substance Use Topics    Alcohol use: Yes       Frequency: 2-4 times a month       Drinks per session: 1 or 2       Comment: Social     Drug use:  No         Exam;     General; well-developed well-nourished adult female  Neck;   no JVD  Chest;  CTA  CVS;  RRR  Abdomen; soft nontender  Musculoskeletal;     Patient has predictable pain both medial and lateral compartments of the left knee she has no significant effusion of the left knee  There is no overlying erythema or bruising  She does have significant discomfort with weight-bearing as well as with flexion and extension      Range of motion of her hip hip flexion is done without pain however internal and external rotation of her left hip offer her modest discomfort in the lateral hip and posterior lateral buttock     Knee x-rays; tricompartmental osteoarthritis left knee     Left hip x-rays;     Impression;     Left knee osteoarthritis  Left knee chronic pain  History greater trochanteric bursitis left hip        Plan; Instructions         Return for As a postoperative patient  Additional Documentation     Vitals:    /72    Pulse 100    Ht 5' 4" (1 626 m)    Wt 82 6 kg (182 lb)    BMI 31 24 kg/m²    BSA 1 88 m²       More Vitals    SmartForms:     [facility] PRE-CHARTING Elaine Hunter PCMH/PCSP WRAP UP REQUIREMENTS ADVANCED       Encounter Info:    Billing Info,    History,    Allergies,    Detailed Report       Orders Placed        Labs       Comprehensive metabolic panel       APTT       C-reactive protein       CBC and differential       Iron Panel (Includes Iron Saturation, Iron, and TIBC)       Protime-INR      Hemoglobin A1C W/EAG Estimation      Isela Nguyễn  (5 more)     Imaging       XR hip/pelv 2-3 vws left if performed         Other Orders      Ambulatory referral to Family Practice Pending Review      Ambulatory referral to Physical Therapy Closed      Case request operating room: ARTHROPLASTY KNEE TOTAL Once         All Encounter Results    Medication Changes         Ascorbic Acid 500 mg Oral Daily       Enoxaparin Sodium 40 mg Subcutaneous Daily (early morning), Provided preop to be used after the surgery    Do not start or use this medication prior to your operation       Ferrous Sulfate 324 mg Oral 2 times daily before meals, Take 1 pill BID, PRE-OP with Vit C, may be constipatory       Folic Acid 1 mg Oral Daily, Take 1 pill po Qday PRE-OP, with Vit C, and Iron      Medication List    Visit Diagnoses         Pain in left leg      Arthritis of left knee      Problem List

## 2019-10-24 NOTE — ANESTHESIA PREPROCEDURE EVALUATION
Review of Systems/Medical History  Patient summary reviewed        Cardiovascular  Hyperlipidemia,    Pulmonary  Negative pulmonary ROS        GI/Hepatic    GERD well controlled,        Negative  ROS        Endo/Other  Negative endo/other ROS      GYN  Negative gynecology ROS          Hematology  Negative hematology ROS      Musculoskeletal    Arthritis     Neurology  Negative neurology ROS      Psychology   Anxiety, Depression ,              Physical Exam    Airway    Mallampati score: III  TM Distance: >3 FB  Neck ROM: full     Dental   No notable dental hx     Cardiovascular      Pulmonary      Other Findings        Anesthesia Plan  ASA Score- 2     Anesthesia Type- spinal and regional with ASA Monitors  Additional Monitors:   Airway Plan:     Comment: Plan for spinal/adductor canal block vs GA/LMA with adductor canal block  Plan Factors-    Induction- intravenous  Postoperative Plan- Plan for postoperative opioid use  Informed Consent- Anesthetic plan and risks discussed with patient  I personally reviewed this patient with the CRNA  Discussed and agreed on the Anesthesia Plan with the CRNA  Shayy Fernandez

## 2019-10-24 NOTE — ANESTHESIA POSTPROCEDURE EVALUATION
Post-Op Assessment Note    CV Status:  Stable  Pain Score: 0    Pain management: adequate     Mental Status:  Alert and awake   Hydration Status:  Euvolemic   PONV Controlled:  Controlled   Airway Patency:  Patent   Post Op Vitals Reviewed: Yes      Staff: CRNA           BP   150/101   Temp   97 5   Pulse  97%   Resp   17   SpO2   95%

## 2019-10-24 NOTE — PLAN OF CARE
Problem: PHYSICAL THERAPY ADULT  Goal: Performs mobility at highest level of function for planned discharge setting  See evaluation for individualized goals  Description  Treatment/Interventions: Functional transfer training, LE strengthening/ROM, Therapeutic exercise, Elevations, Endurance training, Gait training, Spoke to nursing  Equipment Recommended: William Paget       See flowsheet documentation for full assessment, interventions and recommendations  Note:   Prognosis: Good  Problem List: Decreased strength, Decreased range of motion, Decreased endurance, Impaired balance, Decreased coordination, Decreased mobility, Pain  Assessment: Pt is a 68 y/o female who is seen for high complexity PT evaluation s/p elective (L) TKA  Pt co-morbidities/PMHx consist of (L) meniscal repair and hyperlipidemia  [place]  However, pt stated that she will be living with her daughter temporarily while she recovers  PTA, pt reports that she was (I) with all ADLs and IADLs  She does not have any DME installed in the house, but stated that she is planning on having them installed  Pt also owns both a cane and a rolling walker  Upon evaluation, pt was found in her chair  Pt required supervision during transfers and min A x 1 for ambulation but tolerated the treatment well  Pt's vitals remained stable throughout the therapy session with no evidence of post-op orthostatic intolerance  At the end of the session, all devices were reconnected and the patient was left with all needs within reach  Note unstable clinical picture due to recent orthopedic surgery, presentation as a fall risk, and limited mobility  Recommend that pt receive skilled PT until medically cleared for D/C  Once medically cleared, recommend pt receive OPPT  Barriers to Discharge: None     Recommendation: Outpatient PT     PT - OK to Discharge: Yes(once medically cleared)    See flowsheet documentation for full assessment

## 2019-10-24 NOTE — DISCHARGE INSTRUCTIONS
Discharge Instructions - Joaquin Alarcon 69 y o  female MRN: [id number]  Unit/Bed#: PACU 04    Weight Bearing Status:                                           Weight Bearing as tolerated to the left lower extremity  DVT prophylaxis:  Complete course of Lovenox as directed    Pain:  Continue analgesics as directed    Showering Instructions:   Do not shower until follow-up appointment    Dressing Instructions:   Keep dressing clean, dry and intact until follow up appointment  Driving Instructions:  No driving until cleared by Orthopaedic Surgery  PT/OT:  Continue PT/OT on outpatient basis as directed    Appt Instructions:    If you do not have your appointment, please call the clinic at [phone]  Otherwise followup as scheduled below:

## 2019-10-24 NOTE — H&P (VIEW-ONLY)
Office Visit     10/2/2019  [address] [place] [address] Specialists [facility]      Ronald Pulido MD   Orthopedic Surgery   Pain in left leg +1 more   Dx   Left Knee - Follow-up   Reason for Visit    Progress Notes   Uziel Milligan PA-C (Physician Assistant) [facility]   Cosigned by: Ronald Pulido MD at 10/2/2019  5:45 PM   Attestation signed by Ronald Pulido MD at 10/2/2019 5:45 PM   Patient was seen and examined today  Case was reviewed with the physician assistant  I agree the history, exam, assessment and plan as documented by the physician assistant  79-year-old female with arthritis left knee that remained symptomatic despite recent injections corticosteroid and viscosupplementation  She describes life altering pain in left knee joint  She has pain level joint, the pain is made worse bearing weight, the pain increases with increased activities  Examination confirms antalgic gait pattern  Left hip is good mobility  Left thigh has no atrophy  Left knee is not effused  It is in varus  There is bony enlargement tendons medially  There is crepitation flexion extension  There is no palpable warmth the synovium  Calf compartments are soft and supple  Toes on left foot are warm, sensate, mobile  I have personally reviewed x-rays left knee that show bone-on-bone apposition in the medial compartment  I personally reviewed x-rays left hip that were done today my interpretation is as follows: No fracture, dislocation, joint space narrowing seen within the left hip joint  Assessment/plan:  79-year-old female who has osteoarthritis left knee that remained symptomatic despite appropriate nonsurgical means  Treatment options discussed in detail including risks, benefits, alternatives  This point time elective left total knee replacement surgery is indicated  After review of the risks and benefits, consent was that the above-mentioned surgery  No guarantees given    I would welcome the opportunity see this patient back in the office a postoperative patient      Expand All Collapse All    Subjective;     72-year-old adult female patient well known to the practice  She has established osteoarthritic changes of her left knee  She recently had a round of lubricant or Visco supplement injections  She is attempted ADL alterations,   And has practiced generalized wellness  Her intention on arrival to the office today is to discuss elective total knee replacement arthroplasty pertaining to her left knee     In review she has had previous treatment for the soft tissues overlying the greater troch bursa of her left hip  She has discomfort in the lateral hip and lateral buttock with internal and external rotation of the hip therefore x-rays of the left hip were ordered at this time     Medical History        Past Medical History:   Diagnosis Date    Hyperlipidemia      Skin tag       last assessed 17, resolved 10/2/17             Surgical History         Past Surgical History:   Procedure Laterality Date    COLONOSCOPY         Complete, with Dr Meyers, resolved 13    KNEE ARTHROSCOPY Left       resolved     KNEE ARTHROSCOPY W/ MENISCAL REPAIR Left     TONSILLECTOMY AND ADENOIDECTOMY                      Family History   Problem Relation Age of Onset    Stroke Mother           CVA    Hyperlipidemia Mother      Stroke Maternal Grandfather           CVA    Stroke Family           CVA    Other Family           breast disorders    Prostate cancer Father 76    Other Maternal Aunt           breast disorders          Social History            Tobacco Use    Smoking status: Former Smoker       Last attempt to quit: 1980       Years since quittin 7    Smokeless tobacco: Never Used   Substance Use Topics    Alcohol use: Yes       Frequency: 2-4 times a month       Drinks per session: 1 or 2       Comment: Social     Drug use:  No         Exam;     General; well-developed well-nourished adult female  Neck;   no JVD  Chest;  CTA  CVS;  RRR  Abdomen; soft nontender  Musculoskeletal;     Patient has predictable pain both medial and lateral compartments of the left knee she has no significant effusion of the left knee  There is no overlying erythema or bruising  She does have significant discomfort with weight-bearing as well as with flexion and extension      Range of motion of her hip hip flexion is done without pain however internal and external rotation of her left hip offer her modest discomfort in the lateral hip and posterior lateral buttock     Knee x-rays; tricompartmental osteoarthritis left knee     Left hip x-rays;     Impression;     Left knee osteoarthritis  Left knee chronic pain  History greater trochanteric bursitis left hip        Plan; Instructions         Return for As a postoperative patient  Additional Documentation     Vitals:    /72    Pulse 100    Ht 5' 4" (1 626 m)    Wt 82 6 kg (182 lb)    BMI 31 24 kg/m²    BSA 1 88 m²       More Vitals    SmartForms:     [facility] PRE-CHARTING Ocie Laws PCMH/PCSP WRAP UP REQUIREMENTS ADVANCED       Encounter Info:    Billing Info,    History,    Allergies,    Detailed Report       Orders Placed        Labs       Comprehensive metabolic panel       APTT       C-reactive protein       CBC and differential       Iron Panel (Includes Iron Saturation, Iron, and TIBC)       Protime-INR      Hemoglobin A1C W/EAG Estimation      [place]  (5 more)     Imaging       XR hip/pelv 2-3 vws left if performed         Other Orders      Ambulatory referral to Family Practice Pending Review      Ambulatory referral to Physical Therapy Closed      Case request operating room: ARTHROPLASTY KNEE TOTAL Once         All Encounter Results    Medication Changes         Ascorbic Acid 500 mg Oral Daily       Enoxaparin Sodium 40 mg Subcutaneous Daily (early morning), Provided preop to be used after the surgery    Do not start or use this medication prior to your operation       Ferrous Sulfate 324 mg Oral 2 times daily before meals, Take 1 pill BID, PRE-OP with Vit C, may be constipatory       Folic Acid 1 mg Oral Daily, Take 1 pill po Qday PRE-OP, with Vit C, and Iron      Medication List    Visit Diagnoses         Pain in left leg      Arthritis of left knee      Problem List

## 2019-10-24 NOTE — CONSULTS
Internal Medicine Consult Note  Patient: Wilfrido Dyer  Age/sex: 69 y o  female  Medical Record #: [id number]      ASSESSMENT/PLAN: (Interval History)  Wilfrido Dyer is seen and examined and management for following issues:    Status post total left knee replacement  Assessment & Plan  · Continue post op pain control measures as prescribed  Follow bowel regimen to help decrease narcotic induced constipation  Follow post operative hemoglobin with serial CBC and treat accordingly  Monitor WBC and fever curve post op while encouraging use of incentive spirometer  DVT prophylaxis in place and reviewed  Depression  Assessment & Plan  · Cont sertraline/xanax    Gastroesophageal reflux disease without esophagitis  Assessment & Plan  · Cont PPI    Hypercholesterolemia  Assessment & Plan  · Cont low fat low cholesterol diet  · Cont statin therapy          Subjective/ HPI: Patient seen and examined  Patients overnight issues or events were reviewed with nursing or staff during rounds or morning huddle session  New or overnight issues include the following:     ROS:   A 10 point ROS was performed; negative except as noted above         Review of Scheduled Meds:    Current Facility-Administered Medications:  acetaminophen 975 mg Oral Q8H Sharon Lynn MD    ALPRAZolam 0 5 mg Oral Daily PRN Sharon Lynn MD    calcium carbonate 1,000 mg Oral Daily PRN Sharon Lynn MD    cefazolin 1,000 mg Intravenous Q8H Sharon Lynn MD    docusate sodium 100 mg Oral BID Sharon Lynn MD    enoxaparin 40 mg Subcutaneous Q24H Sharon Lynn MD    HYDROmorphone 0 5 mg Intravenous Q2H PRN Sharon Lynn MD    iron sucrose 300 mg Intravenous Once Sharon Lynn MD    lactated ringers 1,000 mL Intravenous Once PRN Sharon Lynn MD    And        lactated ringers 1,000 mL Intravenous Once PRN Sharon Lynn MD    lactated ringers 1 5 mL/kg/hr Intravenous Continuous Sharon Lynn MD Last Rate: 1 5 mL/kg/hr (10/24/19 1100) Spouse Gordo is calling regarding Rossy.  Rossy's brother passed away and Gordo is calling regarding stress and  labor.    Rossy has no symptoms currently.  No triage.     oxyCODONE 10 mg Oral Q4H PRN Hollie Osborn MD    oxyCODONE 5 mg Oral Q4H PRN Hollie Osborn MD    pravastatin 40 mg Oral After Junior Pope MD    [START ON 10/25/2019] senna 1 tablet Oral Daily Hollie Osborn MD    sertraline 100 mg Oral HS Hollie Osborn MD    sodium chloride 1,000 mL Intravenous Once PRN Hollie Osborn MD    And        sodium chloride 1,000 mL Intravenous Once PRN Hollie Osborn MD        Labs: Invalid input(s): LABGLOM, CMP                   No results found for: Inna Ax, WOUNDCULT, SPUTUMCULTUR    Input and Output Summary (last 24 hours): Intake/Output Summary (Last 24 hours) at 10/24/2019 1348  Last data filed at 10/24/2019 1259  Gross per 24 hour   Intake 1780 ml   Output 625 ml   Net 1155 ml       Imaging:     No orders to display       *Labs /Radiology studiesReviewed  *Medications reviewed and reconciled as needed  *Please refer to order section for additional ordered labs studies  *Case discussed with primary attending during morning huddle case rounds    Vitals:   Temp (24hrs), Av 9 °F (36 6 °C), Min:97 5 °F (36 4 °C), Max:98 4 °F (36 9 °C)    Temp:  [97 5 °F (36 4 °C)-98 4 °F (36 9 °C)] 97 7 °F (36 5 °C)  HR:  [] 101  Resp:  [18-35] 35  BP: (102-152)/() 108/62  SpO2:  [91 %-97 %] 92 %  Body mass index is 31 07 kg/m²  Physical Exam:   HEENT:  Head: Normocephalic, no lesions, without obvious abnormality  Head: Normal, normocephalic, atraumatic  Eye: Normal external eye, conjunctiva, lids cornea, GERSON  CARDIAC:  regular rate and rhythm, S1, S2 normal, no murmur, click, rub or gallop  LUNGS:  normal air entry, lungs clear to auscultation  ABDOMEN:  soft, non-tender   Bowel sounds normal  No masses, no organomegaly  EXTREMITIES:  extremities normal, warm and well-perfused; no cyanosis, clubbing, or edema  NEURO:   normal without focal findings, mental status, speech normal, alert and oriented x3, DIAN and reflexes normal and symmetric  INCISION:  C/D/I and dressings present      Invasive Devices     Peripheral Intravenous Line            Peripheral IV 10/24/19 Left Hand less than 1 day          Drain            Closed/Suction Drain Left Knee Accordion 10 Fr  less than 1 day    Urethral Catheter Latex 16 Fr  less than 1 day                   Code Status: Level 1 - Full Code  Current Length of Stay: 0 day(s)    Total floor / unit time spent today 30 minutes with more than 50% spent counseling/coordinating care  Counseling includes discussion with patient re: progress  and discussion with patient of his/her current medical state/information  Coordination of patient's care was performed in conjunction with primary service  Time invested included review of patient's labs, vitals, and management of their comorbidities with continued monitoring  In addition, this patient was discussed with medical team including physician and advanced extenders  The care of the patient was extensively discussed and appropriate treatment plan was formulated unique for this patient  ** Please Note: Fluency Direct voice to text software may have been used in the creation of this document   Audio transcription errors may occur**

## 2019-10-24 NOTE — ANESTHESIA PROCEDURE NOTES
Peripheral Block    Patient location during procedure: holding area  Start time: 10/24/2019 7:32 AM  Reason for block: at surgeon's request and post-op pain management  Staffing  Anesthesiologist: Leesa Willis MD  Performed: anesthesiologist   Preanesthetic Checklist  Completed: patient identified, site marked, surgical consent, pre-op evaluation, timeout performed, IV checked, risks and benefits discussed and monitors and equipment checked  Peripheral Block  Patient position: prone  Prep: ChloraPrep  Patient monitoring: continuous pulse ox and frequent blood pressure checks  Block type: adductor canal block  Laterality: left  Injection technique: single-shot  Procedures: ultrasound guided, Ultrasound guidance required for the procedure to increase accuracy and safety of medication placement and decrease risk of complications    Needle  Needle type: Stimuplex   Needle gauge: 22 G  Needle length: 5 cm  Needle localization: anatomical landmarks and ultrasound guidance  Test dose: negative  Assessment  Injection assessment: incremental injection, local visualized surrounding nerve on ultrasound, negative aspiration for heme and no paresthesia on injection  Paresthesia pain: none  patient tolerated the procedure well with no immediate complications

## 2019-10-24 NOTE — PHYSICAL THERAPY NOTE
Physical Therapy Evaluation    Patient's Name: Surekha Thomas    Admitting Diagnosis  Arthritis of left knee [M17 12]  Pain in left leg [M79 625]    Problem List  Patient Active Problem List   Diagnosis    Hypercholesterolemia    Gastroesophageal reflux disease without esophagitis    Depression    Rosacea    Left knee pain    History of arthroscopy of left knee    Arthritis of left knee    Trochanteric bursitis, left hip    Primary osteoarthritis of left knee    Pain in left leg    Status post total left knee replacement       Past Medical History  Past Medical History:   Diagnosis Date    Hyperlipidemia     Skin tag     last assessed 4/21/17, resolved 10/2/17        Past Surgical History  Past Surgical History:   Procedure Laterality Date    COLONOSCOPY      KNEE ARTHROSCOPY Left     KNEE ARTHROSCOPY W/ MENISCAL REPAIR Left 2000    TONSILLECTOMY AND ADENOIDECTOMY          10/24/19 1505   Note Type   Note type Eval only   Pain Assessment   Pain Assessment 0-10   Pain Score 3   Pain Type Surgical pain   Pain Location Knee   Pain Orientation Left   Pain Descriptors [facility] Pain Intervention(s) Ambulation/increased activity   Response to Interventions tolerated   Home Living   Type of [address] One [address]  (Ranch; [address])   Bathroom Equipment   (none; pt is planning on having DME installed; unspecified)   [address] Cane;Walker   Additional Comments Pt did state that she was going to live with her dtr while she recovers   Prior Function   Level of [place] Independent with ADLs and functional mobility   Lives With Alone   Receives Help From Family  (2 daughters)   ADL Assistance Independent   IADLs Independent   Falls in the last 6 months 1 to 4   Vocational Retired   Restrictions/Precautions   [place] Bearing Precautions Per Order Yes   LLE [place] Bearing Per Order WBAT   Other Precautions Multiple lines;Telemetry; Fall Risk;Pain General   Family/Caregiver Present Yes   Cognition   Overall Cognitive Status WFL   Arousal/Participation Alert   Orientation Level Oriented X4   Memory Within functional limits   Following Commands Follows all commands and directions without difficulty   RLE Assessment   RLE Assessment WFL   LLE Assessment   LLE Assessment   (~3/5; post-op (L) knee TKA)   Transfers   Sit to Stand 4  Minimal assistance   Additional items Assist x 1; Increased time required;Verbal cues   Stand to Sit 4  Minimal assistance   Additional items Assist x 1; Increased time required;Verbal cues   Ambulation/Elevation   Gait pattern Antalgic;Decreased L stance; Excessively slow   Gait Assistance 4  Minimal assist   Additional items Assist x 1   Assistive Device Rolling walker   Distance 110'   Balance   Static Sitting Fair   Dynamic Sitting Fair   Static Standing Fair -   Dynamic Standing Fair -   Ambulatory Poor +   Endurance Deficit   Endurance Deficit Yes   Endurance Deficit Description secondary to pain   Activity Tolerance   Activity Tolerance Patient limited by pain; Patient tolerated treatment well   Nurse Made Aware Yes   Assessment   Prognosis Good   Problem List Decreased strength;Decreased range of motion;Decreased endurance; Impaired balance;Decreased coordination;Decreased mobility;Pain   Assessment Pt is a 68 y/o female who is seen for high complexity PT evaluation s/p elective (L) TKA  Pt co-morbidities/PMHx consist of (L) meniscal repair and hyperlipidemia  Pt [place] alone [place] ranch  However, pt stated that she will be living with her daughter temporarily while she recovers  PTA, pt reports that she was (I) with all ADLs and IADLs  She does not have any DME installed in the house, but stated that she is planning on having them installed  Pt also owns both a cane and a rolling walker  Upon evaluation, pt was found in her chair   Pt required supervision during transfers and min A x 1 for ambulation but tolerated the treatment well  Pt's vitals remained stable throughout the therapy session with no evidence of post-op orthostatic intolerance  At the end of the session, all devices were reconnected and the patient was left with all needs within reach  Note unstable clinical picture due to recent orthopedic surgery, presentation as a fall risk, and limited mobility  Recommend that pt receive skilled PT until medically cleared for D/C  Once medically cleared, recommend pt receive OPPT  Barriers to Discharge None   Goals   Patient Goals get home   STG Expiration Date 11/07/19   Short Term Goal #1 In 10-14 days, patient will be able to ambulate 250-300' w/RW at supervision to improve functional independence; pt will be able to negotiate 1 step at supervision in order to improve ability to get in and out of her house; pt will be able to perform all bed mobility and transfer tasks independently to improve ability to negotiate various surfaces; pt will be able to improve knee flexion ROM > 90*; pt will be independent w/HEP to improve patient's overall recovery   PT Treatment Day 0   Plan   Treatment/Interventions Functional transfer training;LE strengthening/ROM; Therapeutic exercise;Elevations; Endurance training;Gait training;Spoke to nursing   PT Frequency Twice a day;7x/wk   Recommendation   Recommendation Outpatient PT   Equipment Recommended Walker   PT - OK to Discharge Yes  (once medically cleared)   Modified Rochester Scale   Modified [place] Scale 4   Barthel Index   Feeding 10   Bathing 0   Grooming Score 5   Dressing Score 5   Bladder Score 0   Bowels Score 10   Toilet Use Score 5   Transfers (Bed/Chair) Score 5   Mobility (Level Surface) Score 10   Stairs Score 0  (did not perform)   Barthel Index Score 50       Yuriy Hollingsworth, SPT

## 2019-10-25 VITALS
DIASTOLIC BLOOD PRESSURE: 84 MMHG | RESPIRATION RATE: 18 BRPM | BODY MASS INDEX: 30.9 KG/M2 | TEMPERATURE: 98.2 F | WEIGHT: 181 LBS | HEIGHT: 64 IN | SYSTOLIC BLOOD PRESSURE: 123 MMHG | OXYGEN SATURATION: 92 % | HEART RATE: 104 BPM

## 2019-10-25 PROBLEM — M17.12 PRIMARY OSTEOARTHRITIS OF LEFT KNEE: Status: RESOLVED | Noted: 2019-07-19 | Resolved: 2019-10-25

## 2019-10-25 PROBLEM — M25.562 LEFT KNEE PAIN: Status: RESOLVED | Noted: 2019-03-26 | Resolved: 2019-10-25

## 2019-10-25 PROBLEM — M79.605 PAIN IN LEFT LEG: Status: RESOLVED | Noted: 2019-10-02 | Resolved: 2019-10-25

## 2019-10-25 PROBLEM — M17.12 ARTHRITIS OF LEFT KNEE: Status: RESOLVED | Noted: 2019-05-31 | Resolved: 2019-10-25

## 2019-10-25 LAB
ANION GAP SERPL CALCULATED.3IONS-SCNC: 5 MMOL/L (ref 4–13)
BUN SERPL-MCNC: 9 MG/DL (ref 5–25)
CALCIUM SERPL-MCNC: 8.6 MG/DL (ref 8.3–10.1)
CHLORIDE SERPL-SCNC: 109 MMOL/L (ref 100–108)
CO2 SERPL-SCNC: 25 MMOL/L (ref 21–32)
CREAT SERPL-MCNC: 0.56 MG/DL (ref 0.6–1.3)
ERYTHROCYTE [DISTWIDTH] IN BLOOD BY AUTOMATED COUNT: 12.3 % (ref 11.6–15.1)
GFR SERPL CREATININE-BSD FRML MDRD: 93 ML/MIN/1.73SQ M
GLUCOSE SERPL-MCNC: 137 MG/DL (ref 65–140)
HCT VFR BLD AUTO: 35.1 % (ref 34.8–46.1)
HGB BLD-MCNC: 11.4 G/DL (ref 11.5–15.4)
MCH RBC QN AUTO: 31.7 PG (ref 26.8–34.3)
MCHC RBC AUTO-ENTMCNC: 32.5 G/DL (ref 31.4–37.4)
MCV RBC AUTO: 98 FL (ref 82–98)
PLATELET # BLD AUTO: 189 THOUSANDS/UL (ref 149–390)
PMV BLD AUTO: 10.7 FL (ref 8.9–12.7)
POTASSIUM SERPL-SCNC: 4 MMOL/L (ref 3.5–5.3)
RBC # BLD AUTO: 3.6 MILLION/UL (ref 3.81–5.12)
SODIUM SERPL-SCNC: 139 MMOL/L (ref 136–145)
WBC # BLD AUTO: 14.81 THOUSAND/UL (ref 4.31–10.16)

## 2019-10-25 PROCEDURE — NC001 PR NO CHARGE: Performed by: ORTHOPAEDIC SURGERY

## 2019-10-25 PROCEDURE — 97530 THERAPEUTIC ACTIVITIES: CPT

## 2019-10-25 PROCEDURE — 80048 BASIC METABOLIC PNL TOTAL CA: CPT | Performed by: ORTHOPAEDIC SURGERY

## 2019-10-25 PROCEDURE — G8988 SELF CARE GOAL STATUS: HCPCS

## 2019-10-25 PROCEDURE — 99024 POSTOP FOLLOW-UP VISIT: CPT | Performed by: PHYSICIAN ASSISTANT

## 2019-10-25 PROCEDURE — G8987 SELF CARE CURRENT STATUS: HCPCS

## 2019-10-25 PROCEDURE — 97116 GAIT TRAINING THERAPY: CPT

## 2019-10-25 PROCEDURE — 85027 COMPLETE CBC AUTOMATED: CPT | Performed by: NURSE PRACTITIONER

## 2019-10-25 PROCEDURE — 97110 THERAPEUTIC EXERCISES: CPT

## 2019-10-25 PROCEDURE — G8989 SELF CARE D/C STATUS: HCPCS

## 2019-10-25 PROCEDURE — 97166 OT EVAL MOD COMPLEX 45 MIN: CPT

## 2019-10-25 RX ORDER — GABAPENTIN 100 MG/1
100 CAPSULE ORAL 3 TIMES DAILY
Qty: 42 CAPSULE | Refills: 0 | Status: SHIPPED | OUTPATIENT
Start: 2019-10-25 | End: 2019-11-06

## 2019-10-25 RX ORDER — METHOCARBAMOL 500 MG/1
500 TABLET, FILM COATED ORAL 4 TIMES DAILY
Qty: 32 TABLET | Refills: 0 | Status: SHIPPED | OUTPATIENT
Start: 2019-10-25 | End: 2019-11-06

## 2019-10-25 RX ORDER — METHOCARBAMOL 500 MG/1
500 TABLET, FILM COATED ORAL EVERY 6 HOURS SCHEDULED
Status: DISCONTINUED | OUTPATIENT
Start: 2019-10-25 | End: 2019-10-25 | Stop reason: HOSPADM

## 2019-10-25 RX ORDER — DOCUSATE SODIUM 100 MG/1
100 CAPSULE, LIQUID FILLED ORAL 2 TIMES DAILY
Qty: 10 CAPSULE | Refills: 0 | Status: SHIPPED | OUTPATIENT
Start: 2019-10-25 | End: 2019-11-06

## 2019-10-25 RX ORDER — GABAPENTIN 100 MG/1
100 CAPSULE ORAL 3 TIMES DAILY
Status: DISCONTINUED | OUTPATIENT
Start: 2019-10-25 | End: 2019-10-25 | Stop reason: HOSPADM

## 2019-10-25 RX ADMIN — OXYCODONE HYDROCHLORIDE 5 MG: 5 TABLET ORAL at 07:51

## 2019-10-25 RX ADMIN — DOCUSATE SODIUM 100 MG: 100 CAPSULE, LIQUID FILLED ORAL at 08:35

## 2019-10-25 RX ADMIN — OXYCODONE HYDROCHLORIDE 10 MG: 10 TABLET ORAL at 02:23

## 2019-10-25 RX ADMIN — CEFAZOLIN SODIUM 1000 MG: 1 SOLUTION INTRAVENOUS at 00:13

## 2019-10-25 RX ADMIN — SODIUM CHLORIDE, SODIUM LACTATE, POTASSIUM CHLORIDE, AND CALCIUM CHLORIDE 1.5 ML/KG/HR: .6; .31; .03; .02 INJECTION, SOLUTION INTRAVENOUS at 00:13

## 2019-10-25 RX ADMIN — ACETAMINOPHEN 975 MG: 325 TABLET ORAL at 05:30

## 2019-10-25 RX ADMIN — METHOCARBAMOL TABLETS 500 MG: 500 TABLET, COATED ORAL at 12:37

## 2019-10-25 RX ADMIN — SENNOSIDES 8.6 MG: 8.6 TABLET, FILM COATED ORAL at 08:35

## 2019-10-25 RX ADMIN — GABAPENTIN 100 MG: 100 CAPSULE ORAL at 10:34

## 2019-10-25 NOTE — PHYSICAL THERAPY NOTE
Physical Therapy Progress Note     10/25/19 0905   Pain Assessment   Pain Assessment 0-10   Pain Score 5   Pain Location Knee   Pain Orientation Left   Hospital Pain Intervention(s) Repositioned; Ambulation/increased activity; Elevated;Rest;Cold applied   Response to Interventions tolerated   Restrictions/Precautions   LLE Weight Bearing Per Order WBAT   Other Precautions WBS;Pain; Fall Risk   Subjective   Subjective Pt encountered seated in recliner, pleasant and agreeable to treatment  Reports controlled pain at this time  States she has slight lightheadedness during ambulation that resided with time  Also reported knee felt "buckly" with initial ambulation  Eliseo Gunning to go home today  Transfers   Sit to Stand 5  Supervision   Additional items Assist x 1; Armrests; Increased time required   Stand to Sit 5  Supervision   Additional items Assist x 1; Armrests; Increased time required   Toilet transfer 5  Supervision   Additional items Assist x 1; Armrests; Increased time required;Raised toilet seat   Ambulation/Elevation   Gait pattern Excessively slow; Short stride; Step to;Decreased R stance;Decreased foot clearance; Antalgic; Improper Weight shift   Gait Assistance 5  Supervision   Additional items Assist x 1   Assistive Device Rolling walker   Distance 30', 200'   Curbs x3 curb steps beckwards ascending/forwards descending with RW & min A   Balance   Static Sitting Fair +   Static Standing Fair   Ambulatory Fair -   Endurance Deficit   Endurance Deficit Yes   Endurance Deficit Description pain, fatigue   Activity Tolerance   Activity Tolerance Patient tolerated treatment well   Nurse Made Aware yes   Exercises   TKR Sitting;10 reps;AROM;AAROM; Left   Assessment   Prognosis Good   Problem List Decreased strength;Decreased range of motion;Decreased endurance; Impaired balance;Decreased coordination;Decreased mobility;Pain   Assessment Pt demonstrated improved mobility this session    Ambulated increased distances without complaints of excessive pain  Instructions given for reciprocal gait pattern and increased knee flexion during toe off phase of gait  Pt able to demonstrate improved carryover with practice  Pt performed transfers without assist, but required instructions for RW control to maximize safety  Pt ambulated on curb steps as noted above  No LOB noted, but pt required increased  instructions for RW positioning & sequencing during ascent to improve safety  After repeated trials, pt demonstrated improve safety and reported confidence in ability to do so at home  Upon return to room, pt performed L TKR protocol exercises to improve strength & AROM to facilitate improved mobility  Pt has demonstrated sufficient progress to return home with family support and outpatient PT follow up to progress to PLOF  Barriers to Discharge None   Goals   Patient Goals to go home   STG Expiration Date 11/07/19   PT Treatment Day 1   Plan   Treatment/Interventions Functional transfer training;LE strengthening/ROM; Elevations; Therapeutic exercise; Endurance training;Patient/family training;Equipment eval/education; Bed mobility;Gait training   Progress Progressing toward goals   PT Frequency 7x/wk; Twice a day   Recommendation   Recommendation Home with family support; Outpatient PT   Equipment Recommended Brady Palacios   PT - OK to Discharge Yes     Agustina Ball, PTA

## 2019-10-25 NOTE — PROGRESS NOTES
Progress Note - Dae Rush 69 y o  female MRN: [id number]  Unit/Bed#: -01 Encounter: [id number]    Assessment:  Postop day 1 left total knee replacement    Plan:  Left total knee replacement protocol  Potentially discontinue Hemovac drain, later this morning  Physical therapy as ordered  Discharge planning    Weight bearing:   Weight-bearing as tolerated left lower extremity    VTE Pharmacologic Prophylaxis: Enoxaparin (Lovenox)  VTE Mechanical Prophylaxis: foot pump applied    Subjective:   81-year-old adult female who is postop day 1 from left total knee replacement  She is awake alert and reports minimal discomfort    Vitals: Blood pressure 123/85, pulse 104, temperature 98 2 °F (36 8 °C), resp  rate 18, height 5' 4" (1 626 m), weight 82 1 kg (181 lb), SpO2 97 %  ,Body mass index is 31 07 kg/m²  Intake/Output Summary (Last 24 hours) at 10/25/2019 0643  Last data filed at 10/25/2019 0531  Gross per 24 hour   Intake 3762 96 ml   Output 5150 ml   Net -1387 04 ml       Invasive Devices     Peripheral Intravenous Line            Peripheral IV 10/24/19 Left Hand less than 1 day          Drain            Closed/Suction Drain Left Knee Accordion 10 Fr  less than 1 day                Physical Exam:   Awake alert adult female with tachy heart rate, no chest pain or discomfort  Ortho Exam: Knee:  Left lower extremity has Ace wraps from the mid thigh to her foot  Hemovac drain secured to the dressing  Left toes are warm and mobile to command    No calf pain to palpation and negative Homans test    Lab, Imaging and other studies:   CBC:   Lab Results   Component Value Date    WBC 14 81 (H) 10/25/2019    HGB 11 4 (L) 10/25/2019    HCT 35 1 10/25/2019    MCV 98 10/25/2019     10/25/2019    MCH 31 7 10/25/2019    MCHC 32 5 10/25/2019    RDW 12 3 10/25/2019    MPV 10 7 10/25/2019     CMP:   Lab Results   Component Value Date    SODIUM 139 10/25/2019     (H) 10/25/2019    CO2 25 10/25/2019    BUN 9 10/25/2019    CREATININE 0 56 (L) 10/25/2019    CALCIUM 8 6 10/25/2019    EGFR 93 10/25/2019

## 2019-10-25 NOTE — PLAN OF CARE
Problem: PHYSICAL THERAPY ADULT  Goal: Performs mobility at highest level of function for planned discharge setting  See evaluation for individualized goals  Description  Treatment/Interventions: Functional transfer training, LE strengthening/ROM, Therapeutic exercise, Elevations, Endurance training, Gait training, Spoke to nursing  Equipment Recommended: Jennifer Villanueva       See flowsheet documentation for full assessment, interventions and recommendations  Outcome: Progressing  Note:   Prognosis: Good  Problem List: Decreased strength, Decreased range of motion, Decreased endurance, Impaired balance, Decreased coordination, Decreased mobility, Pain  Assessment: Pt demonstrated improved mobility this session  Ambulated increased distances without complaints of excessive pain  Instructions given for reciprocal gait pattern and increased knee flexion during toe off phase of gait  Pt able to demonstrate improved carryover with practice  Pt performed transfers without assist, but required instructions for RW control to maximize safety  Pt ambulated on curb steps as noted above  No LOB noted, but pt required increased  instructions for RW positioning & sequencing during ascent to improve safety  After repeated trials, pt demonstrated improve safety and reported confidence in ability to do so at home  Upon return to room, pt performed L TKR protocol exercises to improve strength & AROM to facilitate improved mobility  Pt has demonstrated sufficient progress to return home with family support and outpatient PT follow up to progress to PLOF  Barriers to Discharge: None     Recommendation: Home with family support, Outpatient PT     PT - OK to Discharge: Yes    See flowsheet documentation for full assessment

## 2019-10-25 NOTE — DISCHARGE SUMMARY
ORTHOPEDICS DISCHARGE SUMMARY  Brooke Griffith 69 y o  female MRN: [id number]  Unit/Bed#:     Attending Physician: Leanne Chacon    Admitting diagnosis: Arthritis of left knee [M17 12]  Pain in left leg [M79 605]    Discharge diagnosis: Arthritis of left knee [M17 12]  Pain in left leg [M79 605]    Date of admission: 10/24/2019    Date of discharge: 10/26/19         Procedure: L TKA    HPI:  This is a 69y o  year old female that presented to the office with signs and symptoms of left knee osteoarthritis  They tried and failed conservative treatment measures and wished to proceed with surgical intervention  The risks, benefits, and complications of the procedure were discussed with the patient and informed consent was obtained  Hospital Course: The patient was admitted to the hospital on 10/24/2019 and underwent an uncomplicated left total knee arthroplasty  They were transferred to the floor after a brief stay in the post-anesthesia care unit  Their pain was well managed with IV and oral pain medications  They began therapy on post operative day #1  Lovenox was also started for DVT prophylaxis 12 hours post operatively  Hemovac drain was removed on POD1  On discharge date pt was cleared by PT and the medicine team and determined to be safe for discharge  Daily discussion was had with the patient, nursing staff, orthopaedic team, and family members if present  All questions were answered to the patients satisifaction  0   Lab Value Date/Time    HGB 11 4 (L) 10/25/2019 0513    HGB 13 8 10/09/2019 0755    HGB 13 3 02/08/2019 0730    HGB 13 8 02/03/2016 0955    HGB 13 4 05/04/2015 1403     Acute blood loss anemia, as evidenced by greater than 2 gram drop of Hgb  Vital signs remained stable and pt was resuscitated with IVF as needed   Body mass index is 31 07 kg/m²  mildly obese  Recommend behavior modifications, nutrition and physical activity  Discharge Instructions:   The patient was discharged weight bearing as tolerated to the left lower extremity  Lovenox will be continued for 28 days  Continue PT/OT  Take pain medications as instructed  Discharge Medications: For the complete list of discharge medications, please refer to the patient's medication reconciliation

## 2019-10-25 NOTE — UTILIZATION REVIEW
Initial Clinical Review    Elective Outpatient surgical procedure  Age/Sex: 69 y o  female  Surgery Date: 10/24  Procedure: S/P ARTHROPLASTY KNEE TOTAL (Left  Anesthesia: Choice    Admission Orders: Date/Time/Statement: Outpatient No Charge Bed 10/24 @ 1518    Vital Signs: /78   Pulse 93   Temp 97 7 °F (36 5 °C)   Resp 18   Ht 5' 4" (1 626 m)   Wt 82 1 kg (181 lb)   SpO2 94%   BMI 31 07 kg/m²      Diet: Regular  Mobility: WBAT LLE  DVT Prophylaxis: Foot Pump  Medications/Pain Control:     Medications:  acetaminophen 975 mg Oral Q8H   docusate sodium 100 mg Oral BID   enoxaparin 40 mg Subcutaneous Q24H   pravastatin 40 mg Oral After Dinner   senna 1 tablet Oral Daily   sertraline 100 mg Oral HS       lactated ringers 1 5 mL/kg/hr Intravenous Continuous       ALPRAZolam 0 5 mg Oral Daily PRN   calcium carbonate 1,000 mg Oral Daily PRN   HYDROmorphone 0 5 mg Intravenous Q2H PRN   lactated ringers 1,000 mL Intravenous Once PRN   And      lactated ringers 1,000 mL Intravenous Once PRN   oxyCODONE 10 mg Oral Q4H PRN 10/24 x2, 10/25 x1   oxyCODONE 5 mg Oral Q4H PRN 10/24 x1   sodium chloride 1,000 mL Intravenous Once PRN   And      sodium chloride 1,000 mL Intravenous Once PRN     Network Utilization Review Department  [email or web address]  ATTENTION: Please call with any questions or concerns to [phone] and carefully listen to the prompts so that you are directed to the right person  All voicemails are confidential   Audrey Patton all requests for admission clinical reviews, approved or denied determinations and any other requests to dedicated fax number below belonging to the campus where the patient is receiving treatment    FACILITY NAME UR FAX NUMBER   ADMISSION DENIALS (Administrative/Medical Necessity) [phone]   PARENT CHILD HEALTH (Maternity/NICU/Pediatrics) [phone]   [facility] [address] [facility] [place] [phone]   [facility] [phone]   [facility] [phone]   [place] [address] [address] [facility] [phone]

## 2019-10-25 NOTE — SOCIAL WORK
CM met with pt to discuss CM role in D/C planning  Pt reported the following:    Emergency Contact: Daughters, Luann Lanes and Tamica Quintana  Level of assist with ADL's PTA: IND  House or Apt: Pt will stay with dtr, Corinn Check post discharge  OMEGA to enter: [address]  BATH on 1st floor: Yes, 1st floor set up at [address]  DME: Kameron Contreras  VNA: None  SNF/Rehab: None    Transportation: Drives Self  Help at home: Daughter    Pharmacy/Rx Coverage: [facility] in [place]  Name of PCP: Dr Tiffany Lynn tx for MH/SA: None    Employment/Income: Retired    Anticipated D/C Plan: Pt will return home with her dtr, Corinn Check to assist and transport  Pt already has Lovenox filled at home  Pt will attend OP therapy at [address] [place] [address] on [address] [facility]  A post acute care recommendation was made by the care team for a RW and BSC  Discussed Freedom of Choice with pt who had no DME company preference and chose [facility]  Josué made referral to [facility]  CM reviewed d/c planning process including the following: identifying help at home, patient preference for d/c planning needs, Discharge Lounge, [facility] Meds to Bed program, availability of treatment team to discuss questions or concerns patient and/or family may have regarding understanding medications and recognizing signs and symptoms once discharged  CM also encouraged patient to follow up with all recommended appointments after discharge  Patient advised of importance for patient and family to participate in managing patients medical well being

## 2019-10-25 NOTE — PROGRESS NOTES
Internal Medicine Progress Note  Patient: Nader Wilkinson  Age/sex: 69 y o  female  Medical Record #: [id number]      ASSESSMENT/PLAN:  Nader Wilkinson is seen and examined and mangement for following issues:    Status post total left knee replacement  Assessment & Plan  · Continue post op pain control measures as prescribed  Follow bowel regimen to help decrease narcotic induced constipation  Follow post operative hemoglobin with serial CBC and treat accordingly  Monitor WBC and fever curve post op while encouraging use of incentive spirometer  DVT prophylaxis in place and reviewed  Labs pending  Depression  Assessment & Plan  · Cont sertraline/xanax     Gastroesophageal reflux disease without esophagitis  Assessment & Plan  · Cont PPI     Hypercholesterolemia  Assessment & Plan  · Cont low fat low cholesterol diet  · Cont statin therapy      Subjective: Patient seen and examined  Patients overnight issues or events were reviewed with nursing or staff during rounds or morning huddle session      ROS:   GI: denies abdominal pain, change bowel habits or reflux symptoms  Neuro: Denies any headache, new vision changes, new neuropathies,new weaknesses   Respiratory: No Cough, SOB, denies wheeze  Cardiovascular: No CP, palpitations , denies perception of rapid heartbeat  : denies any new urinary burning or frequency    Review of Scheduled Meds:    Current Facility-Administered Medications:  acetaminophen 975 mg Oral Q8H Jose Vargas MD    ALPRAZolam 0 5 mg Oral Daily PRN Jose Vargas MD    calcium carbonate 1,000 mg Oral Daily PRN Jose Vargas MD    docusate sodium 100 mg Oral BID Jose Vargas MD    enoxaparin 40 mg Subcutaneous Q24H Jose Vargas MD    HYDROmorphone 0 5 mg Intravenous Q2H PRN Jose Vargas MD    lactated ringers 1,000 mL Intravenous Once PRN Jose Vargas MD    And        lactated ringers 1,000 mL Intravenous Once PRN Jose Vargas MD    lactated ringers 1 5 mL/kg/hr Intravenous Continuous Richard Terry MD Last Rate: Stopped (10/25/19 0531)   oxyCODONE 10 mg Oral Q4H PRN Richard Terry MD    oxyCODONE 5 mg Oral Q4H PRN Richard Terry MD    pravastatin 40 mg Oral After Jesus Stanley MD    senna 1 tablet Oral Daily Richard Terry MD    sertraline 100 mg Oral HS Richard Terry MD    sodium chloride 1,000 mL Intravenous Once PRN Richard Terry MD    And        sodium chloride 1,000 mL Intravenous Once PRN Richard Terry MD        Labs: Invalid input(s): LABGLOM, CMP                   Invasive Devices     Peripheral Intravenous Line            Peripheral IV 10/24/19 Left Hand less than 1 day          Drain            Closed/Suction Drain Left Knee Accordion 10 Fr  less than 1 day    Urethral Catheter Latex 16 Fr  less than 1 day                 *Labs reviewed  *Radiology studies reviewed  *Medications reviewed and reconciled as needed  *Please refer to order section for additional ordered labs studies    Physical Examination:  Vitals:   Vitals:    10/24/19 2000 10/24/19 2303 10/25/19 0125 10/25/19 0210   BP:  131/87  123/85   Pulse:  101  104   Resp:  18  18   Temp:  98 4 °F (36 9 °C)  98 2 °F (36 8 °C)   TempSrc:       SpO2: 90% 92% 92% 97%   Weight:       Height:           GEN: NAD  RESP: CTAB, no R/R/W, good expiratory effort, breath sounds equal  CV: +S1 S2, regular rate, no rubs, PMI normal  ABD: soft, NT, ND, normal BS   : catheter removed;   EXT: DP pulses intact b/l; good cap refill;   Skin: no rashes , no lesions  Neuro: AAOx3 no focality on exam;    Total time spent: At least 35 minutes, with more than 50% spent counseling/coordinating care  Counseling includes discussion with patient re: progress  and discussion with patient of his/her current medical state/information  Coordination of patient's care was performed in conjunction with primary service   Time invested included review of patient's labs, vitals, and management of their comorbidities with continued monitoring  In addition, this patient was discussed with medical team including physician and advanced extenders  The care of the patient was extensively discussed and appropriate treatment plan was formulated unique for this patient  ** Please Note: Dragon 360 Dictation voice to text software may have been used in the creation of this document   **

## 2019-10-25 NOTE — OCCUPATIONAL THERAPY NOTE
[address]  Evaluation     Patient Name: Tanya Glynn  LZZPH'H Date: 10/25/2019  Problem List  Principal Problem:    Status post total left knee replacement  Active Problems:    Hypercholesterolemia    Gastroesophageal reflux disease without esophagitis    Depression    Past Medical History  Past Medical History:   Diagnosis Date    Hyperlipidemia     Skin tag     last assessed 4/21/17, resolved 10/2/17      Past Surgical History  Past Surgical History:   Procedure Laterality Date    COLONOSCOPY      KNEE ARTHROSCOPY Left     KNEE ARTHROSCOPY W/ MENISCAL REPAIR Left 2000    [place] TOTAL KNEE ARTHROPLASTY Left 10/24/2019    Procedure: ARTHROPLASTY KNEE TOTAL;  Surgeon: Kendall Bahena MD;  [facility];  Service: Orthopedics    TONSILLECTOMY AND ADENOIDECTOMY           10/25/19 0830   Note Type   Note type Eval only   Restrictions/Precautions   Weight Bearing Precautions Per Order Yes   RUE Weight Bearing Per Order WBAT   LUE Weight Bearing Per Order WBAT   RLE Weight Bearing Per Order WBAT   LLE Weight Bearing Per Order WBAT   Pain Assessment   Pain Assessment 0-10   Pain Score 5   Pain Type Acute pain   Pain Location Knee   Pain Orientation Left   Hospital Pain Intervention(s) Repositioned; Ambulation/increased activity; Emotional support   Home Living   Type of [address] level   Additional Comments plans to stay with dtr post d/c    Prior Function   Level of [place] Independent with ADLs and functional mobility   Lives With Alone   Receives Help From Family   ADL Assistance Independent   IADLs Independent   Falls in the last 6 months 1 to 4   Vocational Retired   [address] and mobility - i iadls    Reciprocal Relationships supportive family - plans to stay with dtr post d/c    Service to Others retired   [address] offers no c/o    ADL   Eating Assistance 7  Independent   Grooming Assistance 5 Supervision/Setup   UB Bathing Assistance 5  Supervision/Setup   LB Bathing Assistance 5  Supervision/Setup   UB Dressing Assistance 5  Supervision/Setup   LB Dressing Assistance 5  Supervision/Setup   Toileting Assistance  5  Supervision/Setup   Bed Mobility   Additional Comments oob in chair    Transfers   Sit to Stand 5  Supervision   Stand to Sit 5  Supervision   Stand pivot 5  Supervision   Functional Mobility   Functional Mobility 5  Supervision   Additional items Rolling walker   Balance   Static Sitting Good   Dynamic Sitting Fair +   Static Standing Fair   Dynamic Standing Fair   Ambulatory Fair   Activity Tolerance   Activity Tolerance Patient limited by fatigue;Patient limited by pain   RUE Assessment   RUE Assessment WFL   LUE Assessment   LUE Assessment WFL   Cognition   Overall Cognitive Status WFL   Assessment   Limitation Decreased ADL status; Decreased endurance;Decreased self-care trans;Decreased high-level ADLs   Prognosis Good   Assessment Pt is a 69 y o  female who was admitted to [facility] on 10/24/2019 with Status post total left knee replacement   Pt's problem list also includes PMH of previous surgery and hyperlipidemia, L hip bursitis, OA, depression, L tkr  At baseline pt was completing adls and mobility independently - I iadls  Pt lives alone in 1 story home - plans to stay with dtr post d/c   Currently pt requires sba for overall ADLS and sba for functional mobility/transfers  Pt currently presents with impairments in the following categories -limited home support, difficulty performing ADLS and difficulty performing IADLS  activity tolerance, endurance and standing balance/tolerance   These impairments, as well as pt's fatigue, pain, orthopedic restricitions  and WBS   limit pt's ability to safely engage in all baseline areas of occupation, includingbathing, dressing, toileting, functional mobility/transfers, community mobility, laundry , driving, house maintenance, meal prep, cleaning, social participation  and leisure activities  From OT standpoint, recommend home with family support upon D/C    Will need RW and BSC for home - No further acute OT needs indicated at this time - Recommend continued oob for meals, ambulation to/from BR, setup for self care tasks and mobility in hallway with nursing/restorative - d/c from caseload with above recommendations   Goals   Patient Goals go home    Recommendation   OT Discharge Recommendation Home with family support   OT - OK to Discharge Yes   Barthel Index   Feeding 10   Bathing 0   Grooming Score 5   Dressing Score 5   Bladder Score 10   Bowels Score 10   Toilet Use Score 5   Transfers (Bed/Chair) Score 10   Mobility (Level Surface) Score 10   Stairs Score 5   Barthel Index Score [place] [address], [place]

## 2019-10-28 ENCOUNTER — TELEPHONE (OUTPATIENT)
Dept: OBGYN CLINIC | Facility: HOSPITAL | Age: 74
End: 2019-10-28

## 2019-10-28 ENCOUNTER — OFFICE VISIT (OUTPATIENT)
Dept: PHYSICAL THERAPY | Facility: REHABILITATION | Age: 74
End: 2019-10-28
Payer: MEDICARE

## 2019-10-28 DIAGNOSIS — Z96.652 S/P TOTAL KNEE ARTHROPLASTY, LEFT: Primary | ICD-10-CM

## 2019-10-28 PROCEDURE — 97110 THERAPEUTIC EXERCISES: CPT | Performed by: PHYSICAL THERAPIST

## 2019-10-28 PROCEDURE — 97164 PT RE-EVAL EST PLAN CARE: CPT | Performed by: PHYSICAL THERAPIST

## 2019-10-28 NOTE — PROGRESS NOTES
PT Re-Evaluation     Today's date: 10/28/2019  Patient name: Walter Tobar  : 1945  MRN: [id number]  Referring provider: Luzmaria Pena  Dx:   Encounter Diagnosis     ICD-10-CM    1  S/P total knee arthroplasty, left [id number]                   Assessment  Assessment details: Patient presents with pain, decreased ROM, decreased strength, decreased LE flexibility, and decreased function secondary to s/p L TKA  Patient would benefit from skilled PT intervention to address these issues and to maximize function  Thank you for the referral   Impairments: abnormal gait, abnormal or restricted ROM, activity intolerance, impaired balance, impaired physical strength, lacks appropriate home exercise program, pain with function and weight-bearing intolerance  Understanding of Dx/Px/POC: good   Prognosis: good    Goals  Short term goals - to be achieved in 4 weeks:     Decrease pain 20-50%  Increase strength by 1/2 grade  Improve range of motion by 25%  Long term goals - to be achieved by discharge:    Ambulation is improved to maximal level of function  Squatting is improved to maximal level of function  Stair climbing is improved to maximal level of function  IADL performance in related activities is improved to maximal level of function  Performance in related household activities is improved to maximal level of function  Plan  Plan details: Patient was educated in [employer]  All questions were answered to pt's satisfaction      Patient would benefit from: skilled physical therapy  Planned modality interventions: cryotherapy  Planned therapy interventions: manual therapy, joint mobilization, neuromuscular re-education, balance, patient education, therapeutic activities, flexibility, therapeutic exercise, gait training and home exercise program  Frequency: 2x week  Duration in weeks: 12  Plan of Care beginning date: 10/28/2019  Plan of Care expiration date: 2020  Treatment plan discussed with: patient        Subjective Evaluation    History of Present Illness  Mechanism of injury: Pt is a 69 y o female who underwent L TKA on 10/24/2019  Pt denies complications with the surgery and was d/c home the next day  Pt is now referred for OPPT  Pt is scheduled to see Surgeon on 19  Pt is currently staying at her Daughter's house  Pt reports pain/difficulty with full weight bearing on left LE, ambulation (currentlyusing RW at all times and the goal is to not use an assistive device), household activities, sleep (currently using recliner), showering (not yet and is currently sponge bathing)  Pt only has 1 step to get in/out of the house and has been doing the same at her daughter's house  Pt would like to resume some gardening; taking care of her yardwork  Pain  At best pain ratin  At worst pain ratin  Location: left knee  Relieving factors: medications and ice    Treatments  Discharged from (in last 30 days): inpatient hospitalization  Patient Goals  Patient goals for therapy: decreased pain, increased motion, improved balance, decreased edema, increased strength, independence with ADLs/IADLs and return to sport/leisure activities          Objective     Observations     Additional Observation Details  Incision C/D/I with staples intact  No s/s of infection       Tenderness     Additional Tenderness Details  Mild TTP diffusely t/o knee    Neurological Testing     Additional Neurological Details  Pt denies N/T    Active Range of Motion   Left Knee   Flexion: 109 degrees with pain  Extension: 12 degrees with pain    Passive Range of Motion   Left Knee   Flexion: 111 degrees   Extension: 8 degrees     Mobility   Patellar Mobility:   Left Knee   Hypomobile: left superior and left inferior    Strength/Myotome Testing     Left Hip   Planes of Motion   Flexion: 4-    Left Knee   Flexion: 4  Extension: 3+  Quadriceps contraction: fair    Left Ankle/Foot   Dorsiflexion: 5    Tests Additional Tests Details  (-)homans    General Comments:      Knee Comments  Decreased flexibility gastroc, HS               Precautions: s/p L TKA    Daily Treatment Diary     Manual              Left Knee PROM and patellar ROM                                                                     Exercise Diary              Bike             Quad set             Heel slides             SLR             Bridges             clamshells             Step ups             gastroc towel stretch             HS stretch             LAQ             HS curls             Mini squats (pain free)             Gait training PRN                                                                                                            Modalities              CP PRN

## 2019-10-28 NOTE — DISCHARGE SUMMARY
ORTHOPEDICS DISCHARGE SUMMARY  Suzanne Griffith 69 y o  female MRN: [id number]  Unit/Bed#:      Attending Physician: Ashley Davis     Admitting diagnosis: Arthritis of left knee [M17 12]  Pain in left leg [M79 605]     Discharge diagnosis: Arthritis of left knee [M17 12]  Pain in left leg [M79 605]     Date of admission: 10/24/2019     Date of discharge: 10/25/19          Procedure: L TKA     HPI:  This is a 69y o  year old female that presented to the office with signs and symptoms of left knee osteoarthritis  They tried and failed conservative treatment measures and wished to proceed with surgical intervention  The risks, benefits, and complications of the procedure were discussed with the patient and informed consent was obtained   [facility] Course: The patient was admitted to the hospital on 10/24/2019 and underwent an uncomplicated left total knee arthroplasty  They were transferred to the floor after a brief stay in the post-anesthesia care unit  Their pain was well managed with IV and oral pain medications  They began therapy on post operative day #1  Lovenox was also started for DVT prophylaxis 12 hours post operatively  Hemovac drain was removed on POD1  On discharge date pt was cleared by PT and the medicine team and determined to be safe for discharge  Daily discussion was had with the patient, nursing staff, orthopaedic team, and family members if present  All questions were answered to the patients satisifaction               0   Lab Value Date/Time     HGB 11 4 (L) 10/25/2019 0513     HGB 13 8 10/09/2019 0755     HGB 13 3 02/08/2019 0730     HGB 13 8 02/03/2016 0955     HGB 13 4 05/04/2015 1403      Acute blood loss anemia, as evidenced by greater than 2 gram drop of Hgb  Vital signs remained stable and pt was resuscitated with IVF as needed   Body mass index is 31 07 kg/m²  mildly obese  Recommend behavior modifications, nutrition and physical activity      Discharge Instructions:   The patient was discharged weight bearing as tolerated to the left lower extremity  Lovenox will be continued for 28 days  Continue PT/OT  Take pain medications as instructed      Discharge Medications: For the complete list of discharge medications, please refer to the patient's medication reconciliation

## 2019-10-28 NOTE — TELEPHONE ENCOUNTER
Spoke with the patient who reports she is currently residing with her daughter  She reports mild pain and is taking Tylenol only, 650 mg every 6 hours  Patient denies swelling  Patient denies any gabapentin, oxycodone, Robaxin, tramadol use  Patient confirms she is walking with a walker and denies falls  Patient confirms her dressing is clean and dry and denies bleeding  AVS, AVS med list and F/Us reviewed with pt  Patient confirms she is taking her medications as listed on her AVS except for the ones listed above  Patient reports her last bowel movement was today  Patient denies bloody stools  Patient reports she is aware of her outpatient PT appointment night and denies barriers to attending  Patient denies any chest pain, shortness of breath, dizziness, nausea, fevers, or calf pain  She denies having any issues at this time  Patient is aware of her surgeon follow-up  Patient denies having any questions or concerns at this point in time

## 2019-11-01 ENCOUNTER — OFFICE VISIT (OUTPATIENT)
Dept: OBGYN CLINIC | Facility: MEDICAL CENTER | Age: 74
End: 2019-11-01

## 2019-11-01 ENCOUNTER — APPOINTMENT (OUTPATIENT)
Dept: RADIOLOGY | Facility: MEDICAL CENTER | Age: 74
End: 2019-11-01
Payer: MEDICARE

## 2019-11-01 VITALS
BODY MASS INDEX: 30.9 KG/M2 | SYSTOLIC BLOOD PRESSURE: 137 MMHG | HEART RATE: 105 BPM | DIASTOLIC BLOOD PRESSURE: 81 MMHG | HEIGHT: 64 IN | WEIGHT: 181 LBS

## 2019-11-01 DIAGNOSIS — Z96.652 STATUS POST LEFT KNEE REPLACEMENT: ICD-10-CM

## 2019-11-01 DIAGNOSIS — Z96.652 STATUS POST LEFT KNEE REPLACEMENT: Primary | ICD-10-CM

## 2019-11-01 PROCEDURE — 73560 X-RAY EXAM OF KNEE 1 OR 2: CPT

## 2019-11-01 PROCEDURE — 99024 POSTOP FOLLOW-UP VISIT: CPT | Performed by: ORTHOPAEDIC SURGERY

## 2019-11-01 NOTE — PROGRESS NOTES
Subjective; First postoperative appointment for this 62-year-old lady  She is status post left total knee replacement  She is accompanied by her daughter  She has significant range of motion without pain  She is doing physical therapy  Her only discomfort is that which she finds from her hip laterally and posteriorly in the area of her greater troch bursa    She is an individual that has had her trochanteric bursa injected in the past  X-rays left knee were performed on arrival to the office    Past Medical History:   Diagnosis Date    Hyperlipidemia     Skin tag     last assessed 17, resolved 10/2/17        Past Surgical History:   Procedure Laterality Date    COLONOSCOPY      KNEE ARTHROSCOPY Left     KNEE ARTHROSCOPY W/ MENISCAL REPAIR Left     [place] TOTAL KNEE ARTHROPLASTY Left 10/24/2019    Procedure: ARTHROPLASTY KNEE TOTAL;  Surgeon: Jai Cordova MD;  [facility];  Service: Orthopedics    TONSILLECTOMY AND ADENOIDECTOMY         Family History   Problem Relation Age of Onset    Stroke Mother         CVA    Hyperlipidemia Mother     Stroke Maternal Grandfather         CVA    Stroke Family         CVA    Other Family         breast disorders    Prostate cancer Father 76    Other Maternal Aunt         breast disorders        Social History     Tobacco Use    Smoking status: Former Smoker     Types: Cigarettes     Last attempt to quit: 1980     Years since quittin 8    Smokeless tobacco: Never Used   Substance Use Topics    Alcohol use: Yes     Frequency: Monthly or less     Binge frequency: Less than monthly    Drug use: No     Exam;    She has a well appearing vertical incision overlying the left knee  Her staples are intact there is no drainage  She has no calf pain  Range of motion full extension when assisted by the examiner 5 degree lag when done actively by the patient  Flexion greater than 90°  Discomfort right lateral hip focal to the greater trochanteric bursa    X-rays; left knee series finds total knee components intact    Impression;     One week status post left total knee replacement, with excellent success thus far    Plan;    Return next week for staple removal  At that time we will give her a joint recipient identification card  At that time we will give her dental antibiotic prophylaxis  She is to continue physical therapy  She is to continue DVT prophylaxis  Her exam was supervised by and plan formulated by the attending  It was my privilege to assist him in its delivery

## 2019-11-04 NOTE — PROGRESS NOTES
Pt cancelled all appointments and is transferring to the [facility] due to it being closer to her  Pt will be d/c from [facility] at this time

## 2019-11-05 ENCOUNTER — OFFICE VISIT (OUTPATIENT)
Dept: PHYSICAL THERAPY | Age: 74
End: 2019-11-05
Payer: MEDICARE

## 2019-11-05 DIAGNOSIS — Z96.652 S/P TOTAL KNEE ARTHROPLASTY, LEFT: Primary | ICD-10-CM

## 2019-11-05 DIAGNOSIS — M17.12 ARTHRITIS OF LEFT KNEE: ICD-10-CM

## 2019-11-05 PROCEDURE — 97140 MANUAL THERAPY 1/> REGIONS: CPT | Performed by: PHYSICAL THERAPIST

## 2019-11-05 PROCEDURE — 97110 THERAPEUTIC EXERCISES: CPT | Performed by: PHYSICAL THERAPIST

## 2019-11-05 PROCEDURE — 97112 NEUROMUSCULAR REEDUCATION: CPT | Performed by: PHYSICAL THERAPIST

## 2019-11-05 NOTE — PROGRESS NOTES
Daily Note     Today's date: 2019  Patient name: Ximena Ríos  : 1945  MRN: [id number]  Referring provider: Christy Marie  Dx:   Encounter Diagnosis     ICD-10-CM    1  S/P total knee arthroplasty, left [id number]    2  Arthritis of left knee M17 12        Start Time: 0800  Stop Time: 0845  Total time in clinic (min): 45 minutes    Subjective: Pt reports 4/10 pain in operated L knee, mainly "stiffness"  Pt also reports problems sleeping at night but is walking "as much as possible"       Objective: See treatment diary below    TU secs SPC  5 STS: 16 seconds raised height no UE support  Knee ROM     AROM:   Flex: 100 degrees  Ext: - 8 degrees    PROM:  Flex: 110 degrees  Ext: -5 degrees    MMT:  L knee: 4-/5 in all planes of motion   L hip: 4-/5 in all planes of motion         Assessment: Tolerated treatment well  Patient demonstrated fatigue post treatment and would benefit from continued PT Pt transferred to [facility] this session due to location  Pt is motivated and should do well in therapy  Plan: Continue per plan of care        Precautions: s/p L TKA    Daily Treatment Diary     Manual              Left Knee PROM and patellar ROM JF                                                                    Exercise Diary              Bike 5'            Quad set 3*10*5"            Heel slides 30*5"            SLR 3*10            Bridges nv            clamshells nv            Step ups nv            gastroc towel stretch D/c            HS stretch D/c            LAQ nv            HS curls nv            Mini squats (pain free) 3*10            Gait training PRN nv                                                                                                           Modalities              CP PRN

## 2019-11-06 ENCOUNTER — OFFICE VISIT (OUTPATIENT)
Dept: INTERNAL MEDICINE CLINIC | Facility: CLINIC | Age: 74
End: 2019-11-06
Payer: MEDICARE

## 2019-11-06 VITALS
TEMPERATURE: 98.2 F | DIASTOLIC BLOOD PRESSURE: 66 MMHG | WEIGHT: 176 LBS | HEIGHT: 64 IN | SYSTOLIC BLOOD PRESSURE: 96 MMHG | BODY MASS INDEX: 30.05 KG/M2 | OXYGEN SATURATION: 96 % | HEART RATE: 107 BPM

## 2019-11-06 DIAGNOSIS — Z96.652 STATUS POST TOTAL LEFT KNEE REPLACEMENT: Primary | ICD-10-CM

## 2019-11-06 PROCEDURE — 99214 OFFICE O/P EST MOD 30 MIN: CPT | Performed by: NURSE PRACTITIONER

## 2019-11-06 NOTE — PROGRESS NOTES
Assessment/Plan:    Patient presents for hospital follow-up  Unfortunately this cannot be a TCM visit as patient was not contacted by nursing staff in the set amount of time  Patient is status post hospitalization for a left total knee arthroplasty  She is doing well and has her follow-up with Orthopedic last week as well as an upcoming appointment this Friday for staple removal   She is continue with physical therapy  She is using a cane at all times  She is now driving  She is continue with her Lovenox injections  Patient did have questions in regard to her pre surgery medications such as folic acid, ferrous sulfate and Dulcolax if she needs to continue to take them  Discussed with patient that she may stop these at this time  She was previously prescribed methocarbamol and gabapentin  She is not currently taking knees as she completed her hospital course  Do not necessarily see the need for patient to restart and she can discuss with Ortho further at the follow-up appointment  There is extremely mild erythema near incision site  There is no warmth, no drainage, no dehiscence  Discussed with patient to continue to monitor site  She reports that it is itching as result of the healing of the incision staples  Some pain associated with that she is taking oxycodone as needed for  She is experiencing some difficulties in sleeping at night which I discussed that she may try taking her Xanax to help relax her cherry  She should not take this with the oxycodone  Problem List Items Addressed This Visit        Other    Status post total left knee replacement - Primary        M*Modal software was used to dictate this note  It may contain errors with dictating incorrect words or incorrect spelling  Please contact the provider directly with any questions  Subjective:      Patient ID: Wilfrido Dyer is a 69 y o  female  HPI    Patient presents for hospital follow-up    Patient was admitted to the hospital 10/24 and underwent a total knee arthroplasty -left  Patient was discharged on 10/25  Details of her hospitalization are noted below  [facility] discharge summary 10/25/19:  Bobby Chen:  "The patient was admitted to the hospital on 10/24/2019 and underwent an uncomplicated left total knee arthroplasty  They were transferred to the floor after a brief stay in the post-anesthesia care unit  Their pain was well managed with IV and oral pain medications  They began therapy on post operative day #1  Lovenox was also started for DVT prophylaxis 12 hours post operatively   Hemovac drain was removed on POD1  On discharge date pt was cleared by PT and the medicine team and determined to be safe for discharge  Sylvie Hernandez discussion was had with the patient, nursing staff, orthopaedic team, and family members if present  Aditya Mason questions were answered to the patients satisifaction "    Patient was seen by Orthopedics 11/1  Plan to remove staples next week  Patient is to continue physical therapy and DVT prophylaxis  Reviewing Orthopedics note they wish for patient to continue with dental antibiotic prophylaxis  Patient is doing well with no concerns    The following portions of the patient's history were reviewed and updated as appropriate: allergies, current medications, past family history, past medical history, past social history, past surgical history and problem list     Review of Systems   Constitutional: Negative for appetite change, chills, fatigue and fever  Respiratory: Negative for chest tightness, shortness of breath and wheezing  Cardiovascular: Negative for chest pain and palpitations  Gastrointestinal: Negative for abdominal pain, constipation, diarrhea, nausea and vomiting  Genitourinary: Negative for dysuria and hematuria  Musculoskeletal: Positive for arthralgias and gait problem  Skin: Positive for wound  Negative for rash     Neurological: Negative for dizziness, syncope, light-headedness and headaches           Past Medical History:   Diagnosis Date    Hyperlipidemia     Skin tag     last assessed 4/21/17, resolved 10/2/17          Current Outpatient Medications:     ascorbic acid (VITAMIN C) 500 mg tablet, Take 1 tablet (500 mg total) by mouth daily for 60 doses, Disp: 30 tablet, Rfl: 1    B Complex Vitamins (B COMPLEX PO), Take 1 capsule by mouth daily , Disp: , Rfl:     CALCIUM PO, Take 1 capsule by mouth daily , Disp: , Rfl:     Cholecalciferol (VITAMIN D3 PO), Take 1 capsule by mouth daily , Disp: , Rfl:     enoxaparin (LOVENOX) 40 mg/0 4 mL, Inject under the skin daily, Disp: , Rfl:     oxyCODONE (ROXICODONE) 5 mg immediate release tablet, 1 pill po Q4 Hrs prn, Disp: 30 tablet, Rfl: 0    sertraline (ZOLOFT) 100 mg tablet, Take 1 tablet (100 mg total) by mouth daily (Patient taking differently: Take 100 mg by mouth daily at bedtime ), Disp: 90 tablet, Rfl: 1    simvastatin (ZOCOR) 20 mg tablet, Take 1 tablet (20 mg total) by mouth daily at bedtime, Disp: 90 tablet, Rfl: 1    ALPRAZolam (XANAX) 0 5 mg tablet, Take 1 tablet (0 5 mg total) by mouth daily as needed (prn) (Patient not taking: Reported on 11/6/2019), Disp: 30 tablet, Rfl: 1    Allergies   Allergen Reactions    Tetracyclines & Related      Blisters, yeast infection       Social History   Past Surgical History:   Procedure Laterality Date    COLONOSCOPY      KNEE ARTHROSCOPY Left     KNEE ARTHROSCOPY W/ MENISCAL REPAIR Left 2000    [place] TOTAL KNEE ARTHROPLASTY Left 10/24/2019    Procedure: ARTHROPLASTY KNEE TOTAL;  Surgeon: Jamie Trimble MD;  [facility];  Service: Orthopedics    TONSILLECTOMY AND ADENOIDECTOMY       Family History   Problem Relation Age of Onset    Stroke Mother         CVA    Hyperlipidemia Mother     Stroke Maternal Grandfather         CVA    Stroke Family         CVA    Other Family         breast disorders    Prostate cancer Father 76    Other Maternal Aunt breast disorders        Objective:  BP 96/66 (BP Location: Left arm, Patient Position: Sitting, Cuff Size: Standard)   Pulse (!) 107   Temp 98 2 °F (36 8 °C) (Oral)   Ht 5' 4" (1 626 m)   Wt 79 8 kg (176 lb)   SpO2 96%   BMI 30 21 kg/m²      Physical Exam   Constitutional: She is oriented to person, place, and time  She appears well-developed and well-nourished  No distress  Cardiovascular: Normal rate, regular rhythm and normal heart sounds  No murmur heard  No pedal edema   Pulmonary/Chest: Effort normal and breath sounds normal  No respiratory distress  She has no wheezes  Musculoskeletal:   Left knee with staples incision that are healing well  Mild erythema  No warmth  No dehiscence   + diffuse swelling noted  Ambulating with walker   Neurological: She is alert and oriented to person, place, and time  No focal deficits   Skin: Skin is warm and dry  Psychiatric: She has a normal mood and affect  Her behavior is normal  Judgment and thought content normal    Nursing note and vitals reviewed

## 2019-11-06 NOTE — PATIENT INSTRUCTIONS
May try melatonin to help sleep  Keep follow-up with ortho  Continue to ice  Continue with lovenox    May take your xanax to help you sleep  Do not take with your oxycodone    Monitor your BP    Call for consistent elevations or dizziness/lightheadedness

## 2019-11-07 ENCOUNTER — OFFICE VISIT (OUTPATIENT)
Dept: PHYSICAL THERAPY | Age: 74
End: 2019-11-07
Payer: MEDICARE

## 2019-11-07 DIAGNOSIS — M17.12 ARTHRITIS OF LEFT KNEE: ICD-10-CM

## 2019-11-07 DIAGNOSIS — Z96.652 S/P TOTAL KNEE ARTHROPLASTY, LEFT: Primary | ICD-10-CM

## 2019-11-07 PROCEDURE — 97140 MANUAL THERAPY 1/> REGIONS: CPT | Performed by: PHYSICAL THERAPIST

## 2019-11-07 PROCEDURE — 97110 THERAPEUTIC EXERCISES: CPT | Performed by: PHYSICAL THERAPIST

## 2019-11-07 PROCEDURE — 97112 NEUROMUSCULAR REEDUCATION: CPT | Performed by: PHYSICAL THERAPIST

## 2019-11-07 NOTE — PROGRESS NOTES
Daily Note     Today's date: 2019  Patient name: Suly Acevedo  : 1945  MRN: [id number]  Referring provider: Earnestine Munoz  Dx:   Encounter Diagnosis     ICD-10-CM    1  S/P total knee arthroplasty, left Z96 652    2  Arthritis of left knee M17 12        Start Time: 5283  Stop Time: 7674  Total time in clinic (min): 50 minutes    Subjective: Pt reports no new symptoms  Objective: See treatment diary below      Assessment: Tolerated treatment well  Pt's POC was progressed to include more extension based interventions to increase ROM  Patient demonstrated fatigue post treatment and would benefit from continued PT      Plan: Continue per plan of care        Precautions: s/p L TKA    Daily Treatment Diary     Manual             Left Knee PROM and patellar ROM JF JF                                                                   Exercise Diary             Bike 5' 5'           Quad set 3*10*5" 30*5"           Heel slides 30*5" 30*5"           SLR 3*10 3*10           Bridges nv            clamshells nv            Step ups nv 3*10           STS D/c 2*10            HS stretch D/c            LAQ nv 3*10           HS curls nv 3*10           Mini squats (pain free) 3*10 3*10           Gait training PRN nv                                                                                                           Modalities              CP PRN

## 2019-11-08 ENCOUNTER — OFFICE VISIT (OUTPATIENT)
Dept: OBGYN CLINIC | Facility: MEDICAL CENTER | Age: 74
End: 2019-11-08

## 2019-11-08 VITALS
BODY MASS INDEX: 30.05 KG/M2 | WEIGHT: 176 LBS | SYSTOLIC BLOOD PRESSURE: 138 MMHG | HEIGHT: 64 IN | HEART RATE: 101 BPM | DIASTOLIC BLOOD PRESSURE: 83 MMHG

## 2019-11-08 DIAGNOSIS — Z96.652 STATUS POST TOTAL LEFT KNEE REPLACEMENT: Primary | ICD-10-CM

## 2019-11-08 PROCEDURE — 99024 POSTOP FOLLOW-UP VISIT: CPT | Performed by: ORTHOPAEDIC SURGERY

## 2019-11-08 NOTE — PROGRESS NOTES
69 y o female 2 weeks following left total knee replacement who describes slight redness at her left knee incision  She has no febrile episodes    Review of Systems  Review of systems negative unless otherwise specified in HPI    Past Medical History  Past Medical History:   Diagnosis Date    Hyperlipidemia     Skin tag     last assessed 4/21/17, resolved 10/2/17        Past Surgical History  Past Surgical History:   Procedure Laterality Date    COLONOSCOPY      KNEE ARTHROSCOPY Left     KNEE ARTHROSCOPY W/ MENISCAL REPAIR Left 2000    ME TOTAL KNEE ARTHROPLASTY Left 10/24/2019    Procedure: ARTHROPLASTY KNEE TOTAL;  Surgeon: Martha Garvin MD;  [facility];  Service: Orthopedics    TONSILLECTOMY AND ADENOIDECTOMY         Current Medications  Current Outpatient Medications on File Prior to Visit   Medication Sig Dispense Refill    ALPRAZolam (XANAX) 0 5 mg tablet Take 1 tablet (0 5 mg total) by mouth daily as needed (prn) 30 tablet 1    B Complex Vitamins (B COMPLEX PO) Take 1 capsule by mouth daily       CALCIUM PO Take 1 capsule by mouth daily       Cholecalciferol (VITAMIN D3 PO) Take 1 capsule by mouth daily       enoxaparin (LOVENOX) 40 mg/0 4 mL Inject under the skin daily      oxyCODONE (ROXICODONE) 5 mg immediate release tablet 1 pill po Q4 Hrs prn 30 tablet 0    sertraline (ZOLOFT) 100 mg tablet Take 1 tablet (100 mg total) by mouth daily (Patient taking differently: Take 100 mg by mouth daily at bedtime ) 90 tablet 1    simvastatin (ZOCOR) 20 mg tablet Take 1 tablet (20 mg total) by mouth daily at bedtime 90 tablet 1    ascorbic acid (VITAMIN C) 500 mg tablet Take 1 tablet (500 mg total) by mouth daily for 60 doses (Patient not taking: Reported on 11/8/2019) 30 tablet 1     No current facility-administered medications on file prior to visit          Recent Labs [facility])  0   Lab Value Date/Time    HCT 35 1 10/25/2019 0513    HCT 40 9 05/04/2015 1403 HGB 11 4 (L) 10/25/2019 0513    HGB 13 4 05/04/2015 1403    WBC 14 81 (H) 10/25/2019 0513    WBC 5 96 05/04/2015 1403    INR 0 98 10/09/2019 0755    CRP <3 0 10/09/2019 0755    GLUCOSE 96 05/04/2015 1403    HGBA1C 5 7 10/09/2019 0755         Physical exam  · General: Awake, Alert, Oriented  · Eyes: Pupils equal, round and reactive to light  · Heart: regular rate and rhythm  · Lungs: No audible wheezing  · Abdomen: soft  Examination confirms a healed anterior left knee incision  Extension is within 5° of full, flexion is greater than 95°  There is staple line erythema without any kind of spread medial laterally  There is no effusion left knee  There is no tenderness left calf    Imaging  None accompany her today    Procedure  Staples removed, Steri-Strips were applied    Assessment/Plan:   69 y  o female 2 weeks following left total knee replacement with well clinical radiographic appearance  She will continue outpatient therapy designed to promote return of motion and strength to the left knee    Next follow-up will be in 4 weeks time for repeat clinical exam only

## 2019-11-12 ENCOUNTER — OFFICE VISIT (OUTPATIENT)
Dept: PHYSICAL THERAPY | Age: 74
End: 2019-11-12
Payer: MEDICARE

## 2019-11-12 DIAGNOSIS — M17.12 ARTHRITIS OF LEFT KNEE: ICD-10-CM

## 2019-11-12 DIAGNOSIS — Z96.652 S/P TOTAL KNEE ARTHROPLASTY, LEFT: Primary | ICD-10-CM

## 2019-11-12 PROCEDURE — 97110 THERAPEUTIC EXERCISES: CPT | Performed by: PHYSICAL THERAPIST

## 2019-11-12 PROCEDURE — 97140 MANUAL THERAPY 1/> REGIONS: CPT | Performed by: PHYSICAL THERAPIST

## 2019-11-12 PROCEDURE — 97112 NEUROMUSCULAR REEDUCATION: CPT | Performed by: PHYSICAL THERAPIST

## 2019-11-12 NOTE — PROGRESS NOTES
Daily Note     Today's date: 2019  Patient name: Ximena Ríos  : 1945  MRN: [id number]  Referring provider: Christy Marie  Dx:   Encounter Diagnosis     ICD-10-CM    1  S/P total knee arthroplasty, left Z96 652    2  Arthritis of left knee M17 12        Start Time:   Stop Time: 1530  Total time in clinic (min): 45 minutes    Subjective: Pt reports improvements in symptoms  Objective: See treatment diary below      Assessment: Tolerated treatment well  Pt's POC was progressed to include more res of closed chain strengthening exercises to increase tolerance to community ambulation  Patient demonstrated fatigue post treatment and would benefit from continued PT      Plan: Continue per plan of care        Precautions: s/p L TKA    Daily Treatment Diary     Manual            Left Knee PROM and patellar ROM JF JF JF                                                                  Exercise Diary            Bike 5' 5' 5'          Quad set 3*10*5" 30*5" 30*5"          Heel slides 30*5" 30*5" 30*5"          SLR 3*10 3*10 3*10          Bridges nv            clamshells nv            Step ups nv 3*10 3*10          STS D/c 2*10  3*10          HS stretch D/c            LAQ nv 3*10 3*10          HS curls nv 3*10           Mini squats (pain free) 3*10 3*10 3*10          Gait training PRN nv                                                                                                           Modalities              CP PRN

## 2019-11-14 ENCOUNTER — OFFICE VISIT (OUTPATIENT)
Dept: PHYSICAL THERAPY | Age: 74
End: 2019-11-14
Payer: MEDICARE

## 2019-11-14 DIAGNOSIS — M17.12 ARTHRITIS OF LEFT KNEE: ICD-10-CM

## 2019-11-14 DIAGNOSIS — Z96.652 S/P TOTAL KNEE ARTHROPLASTY, LEFT: Primary | ICD-10-CM

## 2019-11-14 PROCEDURE — 97110 THERAPEUTIC EXERCISES: CPT | Performed by: PHYSICAL THERAPIST

## 2019-11-14 PROCEDURE — 97112 NEUROMUSCULAR REEDUCATION: CPT | Performed by: PHYSICAL THERAPIST

## 2019-11-14 PROCEDURE — 97140 MANUAL THERAPY 1/> REGIONS: CPT | Performed by: PHYSICAL THERAPIST

## 2019-11-14 NOTE — PROGRESS NOTES
Daily Note     Today's date: 2019  Patient name: Yesenia Pritchett  : 1945  MRN: [id number]  Referring provider: Sharee Covarrubias  Dx:   Encounter Diagnosis     ICD-10-CM    1  S/P total knee arthroplasty, left Z96 652    2  Arthritis of left knee M17 12        Start Time: 0800  Stop Time: 0845  Total time in clinic (min): 45 minutes    Subjective: Pt reports no new symptoms  Objective: See treatment diary below      Assessment: Tolerated treatment well  Pt's POC was progressed to include more closed chain strengtheing interventions to increase quad activation  Patient demonstrated fatigue post treatment and would benefit from continued PT      Plan: Continue per plan of care        Precautions: s/p L TKA    Daily Treatment Diary     Manual            Left Knee PROM and patellar ROM JF JF JF                                                                  Exercise Diary            Bike 5' 5' 5'          Quad set 3*10*5" 30*5" 30*5"          Heel slides 30*5" 30*5" 30*5"          SLR 3*10 3*10 3*10          Bridges nv            clamshells nv            Step ups nv 3*10 3*10          STS D/c 2*10  3*10          HS stretch D/c            LAQ nv 3*10 3*10          HS curls nv 3*10           Mini squats (pain free) 3*10 3*10 3*10          Gait training PRN nv                                                                                                           Modalities              CP PRN

## 2019-11-19 ENCOUNTER — OFFICE VISIT (OUTPATIENT)
Dept: PHYSICAL THERAPY | Age: 74
End: 2019-11-19
Payer: MEDICARE

## 2019-11-19 ENCOUNTER — TELEPHONE (OUTPATIENT)
Dept: INTERNAL MEDICINE CLINIC | Age: 74
End: 2019-11-19

## 2019-11-19 DIAGNOSIS — M17.12 ARTHRITIS OF LEFT KNEE: ICD-10-CM

## 2019-11-19 DIAGNOSIS — Z96.652 S/P TOTAL KNEE ARTHROPLASTY, LEFT: Primary | ICD-10-CM

## 2019-11-19 PROCEDURE — 97140 MANUAL THERAPY 1/> REGIONS: CPT | Performed by: PHYSICAL THERAPIST

## 2019-11-19 PROCEDURE — 97112 NEUROMUSCULAR REEDUCATION: CPT | Performed by: PHYSICAL THERAPIST

## 2019-11-19 PROCEDURE — 97116 GAIT TRAINING THERAPY: CPT | Performed by: PHYSICAL THERAPIST

## 2019-11-19 PROCEDURE — 97110 THERAPEUTIC EXERCISES: CPT | Performed by: PHYSICAL THERAPIST

## 2019-11-19 NOTE — PROGRESS NOTES
Daily Note     Today's date: 2019  Patient name: Phoebe Singer  : 1945  MRN: [id number]  Referring provider: Velvet Gamboa  Dx:   Encounter Diagnosis     ICD-10-CM    1  S/P total knee arthroplasty, left Z96 652    2  Arthritis of left knee M17 12        Start Time: 0800  Stop Time: 0855  Total time in clinic (min): 55 minutes    Subjective: Pt reports some scabbing at proximal portion of incision  Objective: See treatment diary below      Assessment: Tolerated treatment well  Pt incision was inspected and patient was educated on proper care at this time and instructed not to use topical creams at this time  Incision shows no signs of infection  Pt was educated on  Patient demonstrated fatigue post treatment and would benefit from continued PT      Plan: Continue per plan of care        Precautions: s/p L TKA    Daily Treatment Diary     Manual           Left Knee PROM and patellar ROM JF JF JF JF                                                                 Exercise Diary           Bike 5' 5' 5' 5'         Quad set 3*10*5" 30*5" 30*5" 30*5"         Heel slides 30*5" 30*5" 30*5" 30*5"         SLR 3*10 3*10 3*10 3*10         Bridges nv            clamshells nv            Step ups nv 3*10 3*10 3*10         STS D/c 2*10  3*10 3*10         HS stretch D/c            LAQ nv 3*10 3*10 3*10         HS curls nv 3*10           Mini squats (pain free) 3*10 3*10 3*10 3*10         Gait training PRN nv   10 min         Wall ball    2*10*5"                                                                                           Modalities              CP PRN

## 2019-11-21 ENCOUNTER — OFFICE VISIT (OUTPATIENT)
Dept: PHYSICAL THERAPY | Age: 74
End: 2019-11-21
Payer: MEDICARE

## 2019-11-21 DIAGNOSIS — Z96.652 S/P TOTAL KNEE ARTHROPLASTY, LEFT: Primary | ICD-10-CM

## 2019-11-21 DIAGNOSIS — M17.12 ARTHRITIS OF LEFT KNEE: ICD-10-CM

## 2019-11-21 PROCEDURE — 97112 NEUROMUSCULAR REEDUCATION: CPT | Performed by: PHYSICAL THERAPIST

## 2019-11-21 PROCEDURE — 97140 MANUAL THERAPY 1/> REGIONS: CPT | Performed by: PHYSICAL THERAPIST

## 2019-11-21 PROCEDURE — 97110 THERAPEUTIC EXERCISES: CPT | Performed by: PHYSICAL THERAPIST

## 2019-11-21 NOTE — PROGRESS NOTES
Daily Note     Today's date: 2019  Patient name: Surekha Thomas  : 1945  MRN: [id number]  Referring provider: Avtar Roberto  Dx:   Encounter Diagnosis     ICD-10-CM    1  S/P total knee arthroplasty, left Z96 652    2  Arthritis of left knee M17 12        Start Time: 0800  Stop Time: 0845  Total time in clinic (min): 45 minutes    Subjective: Pt reports improvements in sleeping  Objective: See treatment diary below      Assessment: Tolerated treatment well  Pt's POC was progressed to include more closed chain proximal strengthening to increase tolerance to community ambulation  Patient demonstrated fatigue post treatment and would benefit from continued PT      Plan: Continue per plan of care        Precautions: s/p L TKA    Daily Treatment Diary     Manual          Left Knee PROM and patellar ROM JF JF JF JF JF                                                                Exercise Diary          Bike 5' 5' 5' 5' 5'        Quad set 3*10*5" 30*5" 30*5" 30*5" 30*5"        Heel slides 30*5" 30*5" 30*5" 30*5" 30*5"        SLR 3*10 3*10 3*10 3*10 3*10        Bridges nv    3*10        clamshells nv            Step ups nv 3*10 3*10 3*10 3*10        STS D/c 2*10  3*10 3*10 3*10        HS stretch D/c            LAQ nv 3*10 3*10 3*10         HS curls nv 3*10           Mini squats (pain free) 3*10 3*10 3*10 3*10 3*10        Gait training PRN nv   10 min 10 min        Wall ball    2*10*5" 2*10*5"                                                                                          Modalities              CP PRN

## 2019-11-25 ENCOUNTER — OFFICE VISIT (OUTPATIENT)
Dept: PHYSICAL THERAPY | Age: 74
End: 2019-11-25
Payer: MEDICARE

## 2019-11-25 DIAGNOSIS — Z96.652 S/P TOTAL KNEE ARTHROPLASTY, LEFT: Primary | ICD-10-CM

## 2019-11-25 DIAGNOSIS — M17.12 ARTHRITIS OF LEFT KNEE: ICD-10-CM

## 2019-11-25 PROCEDURE — 97112 NEUROMUSCULAR REEDUCATION: CPT | Performed by: PHYSICAL THERAPIST

## 2019-11-25 PROCEDURE — 97110 THERAPEUTIC EXERCISES: CPT | Performed by: PHYSICAL THERAPIST

## 2019-11-25 PROCEDURE — 97140 MANUAL THERAPY 1/> REGIONS: CPT | Performed by: PHYSICAL THERAPIST

## 2019-11-25 NOTE — PROGRESS NOTES
Daily Note     Today's date: 2019  Patient name: Omar Martino  : 1945  MRN: [id number]  Referring provider: Adriana River  Dx:   Encounter Diagnosis     ICD-10-CM    1  S/P total knee arthroplasty, left Z96 652    2  Arthritis of left knee M17 12        Start Time: 0800  Stop Time: 0845  Total time in clinic (min): 45 minutes    Subjective: Pt reports improvements in symptoms  Objective: See treatment diary below      Assessment: Tolerated treatment well  Pt's POC was progressed to include more closed chain strengtheing to increase tolerance to community ambulation  Patient demonstrated fatigue post treatment and would benefit from continued PT      Plan: Continue per plan of care        Precautions: s/p L TKA    Daily Treatment Diary     Manual         Left Knee PROM and patellar ROM JF JF JF JF JF JF                                                               Exercise Diary         Bike 5' 5' 5' 5' 5' 5'       Quad set 3*10*5" 30*5" 30*5" 30*5" 30*5" 20*5"       Heel slides 30*5" 30*5" 30*5" 30*5" 30*5"        SLR 3*10 3*10 3*10 3*10 3*10 3*10       Bridges nv    3*10 3*10       clamshells nv            Step ups nv 3*10 3*10 3*10 3*10 3*10       STS D/c 2*10  3*10 3*10 3*10 3*10       HS stretch D/c            LAQ nv 3*10 3*10 3*10  3*10       HS curls nv 3*10           Mini squats (pain free) 3*10 3*10 3*10 3*10 3*10 4*10       Gait training PRN nv   10 min 10 min        Wall ball    2*10*5" 2*10*5" 3*10*5"                                                                                         Modalities              CP PRN

## 2019-11-27 ENCOUNTER — OFFICE VISIT (OUTPATIENT)
Dept: PHYSICAL THERAPY | Age: 74
End: 2019-11-27
Payer: MEDICARE

## 2019-11-27 DIAGNOSIS — M17.12 ARTHRITIS OF LEFT KNEE: ICD-10-CM

## 2019-11-27 DIAGNOSIS — Z96.652 S/P TOTAL KNEE ARTHROPLASTY, LEFT: Primary | ICD-10-CM

## 2019-11-27 PROCEDURE — 97110 THERAPEUTIC EXERCISES: CPT | Performed by: PHYSICAL THERAPIST

## 2019-11-27 PROCEDURE — 97140 MANUAL THERAPY 1/> REGIONS: CPT | Performed by: PHYSICAL THERAPIST

## 2019-11-27 PROCEDURE — 97112 NEUROMUSCULAR REEDUCATION: CPT | Performed by: PHYSICAL THERAPIST

## 2019-11-27 NOTE — PROGRESS NOTES
Daily Note     Today's date: 2019  Patient name: Jennifer Laureano  : 1945  MRN: [id number]  Referring provider: Alexy Cheema  Dx:   Encounter Diagnosis     ICD-10-CM    1  S/P total knee arthroplasty, left Z96 652    2  Arthritis of left knee M17 12        Start Time: 930  Stop Time: 1015  Total time in clinic (min): 45 minutes    Subjective: Pt reports improvements with symptoms  Objective: See treatment diary below      Assessment: Tolerated treatment well  Patient demonstrated fatigue post treatment and would benefit from continued PT      Plan: Continue per plan of care        Precautions: s/p L TKA    Daily Treatment Diary     Manual        Left Knee PROM and patellar ROM JF MARY JANE HINTON JF JF                                                              Exercise Diary        Bike 5' 5' 5' 5' 5' 5' 5'      Quad set 3*10*5" 30*5" 30*5" 30*5" 30*5" 20*5"       Heel slides 30*5" 30*5" 30*5" 30*5" 30*5"  30*5"      SLR 3*10 3*10 3*10 3*10 3*10 3*10 3*10      Bridges nv    3*10 3*10 3*10      clamshells nv      3*10      Step ups nv 3*10 3*10 3*10 3*10 3*10 3*10      STS D/c 2*10  3*10 3*10 3*10 3*10 3*10      HS stretch D/c            LAQ nv 3*10 3*10 3*10  3*10       HS curls nv 3*10           Mini squats (pain free) 3*10 3*10 3*10 3*10 3*10 4*10 4*10      Gait training PRN nv   10 min 10 min        Wall ball    2*10*5" 2*10*5" 3*10*5" 3*10*5"      Leg                                                                                  Modalities              CP PRN

## 2019-12-03 ENCOUNTER — APPOINTMENT (OUTPATIENT)
Dept: PHYSICAL THERAPY | Age: 74
End: 2019-12-03
Payer: MEDICARE

## 2019-12-05 ENCOUNTER — OFFICE VISIT (OUTPATIENT)
Dept: PHYSICAL THERAPY | Age: 74
End: 2019-12-05
Payer: MEDICARE

## 2019-12-05 DIAGNOSIS — Z96.652 S/P TOTAL KNEE ARTHROPLASTY, LEFT: Primary | ICD-10-CM

## 2019-12-05 DIAGNOSIS — M17.12 ARTHRITIS OF LEFT KNEE: ICD-10-CM

## 2019-12-05 PROCEDURE — 97112 NEUROMUSCULAR REEDUCATION: CPT | Performed by: PHYSICAL THERAPIST

## 2019-12-05 PROCEDURE — 97110 THERAPEUTIC EXERCISES: CPT | Performed by: PHYSICAL THERAPIST

## 2019-12-05 PROCEDURE — 97140 MANUAL THERAPY 1/> REGIONS: CPT | Performed by: PHYSICAL THERAPIST

## 2019-12-05 NOTE — PROGRESS NOTES
Daily Note     Today's date: 2019  Patient name: Aliyah Lagunas  : 1945  MRN: [id number]  Referring provider: Domi Neal  Dx:   Encounter Diagnosis     ICD-10-CM    1  S/P total knee arthroplasty, left Z96 652    2  Arthritis of left knee M17 12        Start Time: 0845  Stop Time: 0930  Total time in clinic (min): 45 minutes    Subjective: Pt reports improvements in symptoms and being able to work for hours at [employer] [facility] [employer] [facility] [employer]  Objective: See treatment diary below    Left knee MMT:  Flexion: 4+/5  Extension: 4+/5    Left Knee ROM:    AROM: --115  PROM: -     Functional Assessment:  5 STS: 10 secs   TUG 8 secs          Assessment: Tolerated treatment well  Pt demonstrates great functional mobility and gait demonstrated in 5 STS and TUG  Pt was educated to focus on improving terminal extension to further improve functional mobility  Patient demonstrated fatigue post treatment and would benefit from continued PT      Plan: Continue per plan of care        Precautions: s/p L TKA    Daily Treatment Diary     Manual   12      Left Knee PROM and patellar ROM MARY JANE HINTON JF                                                              Exercise Diary   12/      Bike 5' 5' 5' 5' 5' 5' 5'      Quad set 3*10*5" 30*5" 30*5" 30*5" 30*5" 20*5"       Heel slides 30*5" 30*5" 30*5" 30*5" 30*5"  30*5"      SLR 3*10 3*10 3*10 3*10 3*10 3*10 3*10      Bridges nv    3*10 3*10 3*10      clamshells nv      3*10      Step ups nv 3*10 3*10 3*10 3*10 3*10 3*10      STS D/c 2*10  3*10 3*10 3*10 3*10 3*10      HS stretch D/c            LAQ nv 3*10 3*10 3*10  3*10       HS curls nv 3*10           Mini squats (pain free) 3*10 3*10 3*10 3*10 3*10 4*10 4*10      Gait training PRN nv   10 min 10 min        Wall ball    2*10*5" 2*10*5" 3*10*5" 3*10*5"      Leg Modalities              CP PRN

## 2019-12-06 ENCOUNTER — OFFICE VISIT (OUTPATIENT)
Dept: OBGYN CLINIC | Facility: MEDICAL CENTER | Age: 74
End: 2019-12-06

## 2019-12-06 VITALS
SYSTOLIC BLOOD PRESSURE: 111 MMHG | HEART RATE: 96 BPM | BODY MASS INDEX: 30.05 KG/M2 | WEIGHT: 176 LBS | HEIGHT: 64 IN | DIASTOLIC BLOOD PRESSURE: 79 MMHG

## 2019-12-06 DIAGNOSIS — Z96.652 STATUS POST TOTAL LEFT KNEE REPLACEMENT: Primary | ICD-10-CM

## 2019-12-06 PROCEDURE — 99024 POSTOP FOLLOW-UP VISIT: CPT | Performed by: ORTHOPAEDIC SURGERY

## 2019-12-06 NOTE — PROGRESS NOTES
Assessment:  1  Status post total left knee replacement         Plan:  The patient is doing well  She should keep her incision dry without ointment or cream   She should continue physical therapy  She should follow up in 6 weeks  To do next visit:  Return in about 6 weeks (around 1/17/2020)  The above stated was discussed in layman's terms and the patient expressed understanding  All questions were answered to the patient's satisfaction  Scribe Attestation    I,:   Cynthia Fuentes am acting as a scribe while in the presence of the attending physician :        I,:   Zelda Monroe MD personally performed the services described in this documentation    as scribed in my presence :              Subjective:   Walter Tobar is a 69 y o  female who presents 6 weeks s/p left TKA, 10/24/19  She is doing well  Today she complains of mild pain and stiffness  She does participate in physical therapy with benefit  She will use IBU on occasion  She denies fever or chills          Review of systems negative unless otherwise specified in HPI    Past Medical History:   Diagnosis Date    Hyperlipidemia     Skin tag     last assessed 4/21/17, resolved 10/2/17        Past Surgical History:   Procedure Laterality Date    COLONOSCOPY      KNEE ARTHROSCOPY Left     KNEE ARTHROSCOPY W/ MENISCAL REPAIR Left 2000    TX TOTAL KNEE ARTHROPLASTY Left 10/24/2019    Procedure: ARTHROPLASTY KNEE TOTAL;  Surgeon: Zelda Monroe MD;  [facility];  Service: Orthopedics    TONSILLECTOMY AND ADENOIDECTOMY         Family History   Problem Relation Age of Onset    Stroke Mother         CVA    Hyperlipidemia Mother     Stroke Maternal Grandfather         CVA    Stroke Family         CVA    Other Family         breast disorders    Prostate cancer Father 76    Other Maternal Aunt         breast disorders        Social History     Occupational History    Occupation: Caterer   Tobacco Use    Smoking status: Former Smoker     Types: Cigarettes     Last attempt to quit: 1980     Years since quittin 9    Smokeless tobacco: Never Used   Substance and Sexual Activity    Alcohol use: Yes     Frequency: Monthly or less     Binge frequency: Less than monthly    Drug use: No    Sexual activity: Not on file         Current Outpatient Medications:     ALPRAZolam (XANAX) 0 5 mg tablet, Take 1 tablet (0 5 mg total) by mouth daily as needed (prn), Disp: 30 tablet, Rfl: 1    B Complex Vitamins (B COMPLEX PO), Take 1 capsule by mouth daily , Disp: , Rfl:     CALCIUM PO, Take 1 capsule by mouth daily , Disp: , Rfl:     Cholecalciferol (VITAMIN D3 PO), Take 1 capsule by mouth daily , Disp: , Rfl:     sertraline (ZOLOFT) 100 mg tablet, Take 1 tablet (100 mg total) by mouth daily (Patient taking differently: Take 100 mg by mouth daily at bedtime ), Disp: 90 tablet, Rfl: 1    simvastatin (ZOCOR) 20 mg tablet, Take 1 tablet (20 mg total) by mouth daily at bedtime, Disp: 90 tablet, Rfl: 1    ascorbic acid (VITAMIN C) 500 mg tablet, Take 1 tablet (500 mg total) by mouth daily for 60 doses (Patient not taking: Reported on 2019), Disp: 30 tablet, Rfl: 1    enoxaparin (LOVENOX) 40 mg/0 4 mL, Inject under the skin daily, Disp: , Rfl:     oxyCODONE (ROXICODONE) 5 mg immediate release tablet, 1 pill po Q4 Hrs prn (Patient not taking: Reported on 2019), Disp: 30 tablet, Rfl: 0    Allergies   Allergen Reactions    Tetracyclines & Related      Blisters, yeast infection            Vitals:    19 1018   BP: 111/79   Pulse: 96       Objective:  Physical exam  · General: Awake, Alert, Oriented  · Eyes: Pupils equal, round and reactive to light  · Heart: regular rate and rhythm  · Lungs: No audible wheezing  · Abdomen: soft                    Ortho Exam   Left knee:  Well healed anterior incision with small are of non-healing wound over superior aspect  No erythema and no ecchymosis   Appropriate swelling of lower limb  Appropriate warmth of knee  Extensor mechanism intact  Extension full  Flexion 115  Calf compartments soft and supple  Sensation intact  Toes are warm sensate and mobile      Diagnostics, reviewed and taken today if performed as documented:    None performed    Procedures, if performed today:    Procedures    None performed      Portions of the record may have been created with voice recognition software  Occasional wrong word or "sound a like" substitutions may have occurred due to the inherent limitations of voice recognition software  Read the chart carefully and recognize, using context, where substitutions have occurred

## 2019-12-10 ENCOUNTER — EVALUATION (OUTPATIENT)
Dept: PHYSICAL THERAPY | Age: 74
End: 2019-12-10
Payer: MEDICARE

## 2019-12-10 DIAGNOSIS — Z96.652 S/P TOTAL KNEE ARTHROPLASTY, LEFT: Primary | ICD-10-CM

## 2019-12-10 DIAGNOSIS — M17.12 ARTHRITIS OF LEFT KNEE: ICD-10-CM

## 2019-12-10 PROCEDURE — 97112 NEUROMUSCULAR REEDUCATION: CPT | Performed by: PHYSICAL THERAPIST

## 2019-12-10 PROCEDURE — 97140 MANUAL THERAPY 1/> REGIONS: CPT | Performed by: PHYSICAL THERAPIST

## 2019-12-10 PROCEDURE — 97110 THERAPEUTIC EXERCISES: CPT | Performed by: PHYSICAL THERAPIST

## 2019-12-10 NOTE — PROGRESS NOTES
PT Re-Evaluation     Today's date: 12/10/2019  Patient name: Mikayla Oneal  : 1945  MRN: [id number]  Referring provider: Dave Scott  Dx:   Encounter Diagnosis     ICD-10-CM    1  S/P total knee arthroplasty, left [id number]                   Assessment  Assessment details: Patient presents with improvements in pain, ROM, strength,  LE flexibility, and  function secondary since IE  Pt still has difficulty with functional transfers and stair climbing  Patient would benefit from skilled PT intervention to address these issues and to maximize function  Thank you for the referral   Impairments: abnormal gait, abnormal or restricted ROM, activity intolerance, impaired balance, impaired physical strength, lacks appropriate home exercise program, pain with function and weight-bearing intolerance  Understanding of Dx/Px/POC: good   Prognosis: good    Goals  Short term goals - to be achieved in 4 weeks:  MET   Decrease pain 20-50%  Increase strength by 1/2 grade  Improve range of motion by 25%  Long term goals - to be achieved by discharge: PARTIALLY MET   Ambulation is improved to maximal level of function  Squatting is improved to maximal level of function  Stair climbing is improved to maximal level of function  IADL performance in related activities is improved to maximal level of function  Performance in related household activities is improved to maximal level of function  Plan  Plan details: Patient was educated in [employer]  All questions were answered to pt's satisfaction      Patient would benefit from: skilled physical therapy  Planned modality interventions: cryotherapy  Planned therapy interventions: manual therapy, joint mobilization, neuromuscular re-education, balance, patient education, therapeutic activities, flexibility, therapeutic exercise, gait training and home exercise program  Frequency: 2x week  Duration in weeks: 8   Plan of Care beginning date: 12/10/2019  Plan of Care expiration date: 2020  Treatment plan discussed with: patient        Subjective Evaluation    History of Present Illness  Mechanism of injury: Pt is a 69 y o female who underwent L TKA on 10/24/2019  Pt denies complications with the surgery and was d/c home the next day  Pt is now referred for OPPT  Pt is scheduled to see Surgeon on 19  Pt is currently staying at her Daughter's house  Pt reports pain/difficulty with full weight bearing on left LE, ambulation (currentlyusing RW at all times and the goal is to not use an assistive device), household activities, sleep (currently using recliner), showering (not yet and is currently sponge bathing)  Pt only has 1 step to get in/out of the house and has been doing the same at her daughter's house  Pt would like to resume some gardening; taking care of her yardwork  Pain  At best pain ratin  At worst pain rating: 3  Location: left knee  Relieving factors: medications and ice    Treatments  Discharged from (in last 30 days): inpatient hospitalization  Patient Goals  Patient goals for therapy: decreased pain, increased motion, improved balance, decreased edema, increased strength, independence with ADLs/IADLs and return to sport/leisure activities          Objective     Observations     Additional Observation Details  Incision C/D/I with staples intact  No s/s of infection       Tenderness     Additional Tenderness Details  Mild TTP diffusely t/o knee    Neurological Testing     Additional Neurological Details  Pt denies N/T    Active Range of Motion   Left Knee   Flexion: 130 degrees with pain  Extension: 8 degrees with pain    Passive Range of Motion   Left Knee   Flexion: 135 degrees   Extension: 2 degrees     Mobility   Patellar Mobility:   Left Knee   Hypomobile: left superior and left inferior    Strength/Myotome Testing     Left Hip   Planes of Motion   Flexion: 4+    Left Knee   Flexion: 4+  Extension: 4  Quadriceps contraction: fair    Left Ankle/Foot   Dorsiflexion: 5    Tests     Additional Tests Details  (-)homans    General Comments:      Knee Comments  Decreased flexibility gastroc, HS               Precautions: s/p L TKA    Daily Treatment Diary       Manual  11/5 11/7 11/12 11/18 11/21 11/25 12/5 12/10     Left Knee PROM and patellar ROM MARY JANE HINTON                                                             Exercise Diary  11/5 11/7 11/12 11/18 11/21 11/25 12/10      Bike 5' 5' 5' 5' 5' 5' 5'      Quad set 3*10*5" 30*5" 30*5" 30*5" 30*5" 20*5"       Heel slides 30*5" 30*5" 30*5" 30*5" 30*5"  30*5"      SLR 3*10 3*10 3*10 3*10 3*10 3*10 3*10      Bridges nv    3*10 3*10       clamshells nv            Step ups nv 3*10 3*10 3*10 3*10 3*10 3*10      STS D/c 2*10  3*10 3*10 3*10 3*10 3*10      HS stretch D/c            LAQ nv 3*10 3*10 3*10  3*10       HS curls nv 3*10           Mini squats (pain free) 3*10 3*10 3*10 3*10 3*10 4*10 4*10      Gait training PRN nv   10 min 10 min        Wall ball    2*10*5" 2*10*5" 3*10*5" 3*10*5"      Leg                                                                                  Modalities              CP PRN

## 2019-12-12 ENCOUNTER — OFFICE VISIT (OUTPATIENT)
Dept: PHYSICAL THERAPY | Age: 74
End: 2019-12-12
Payer: MEDICARE

## 2019-12-12 DIAGNOSIS — M17.12 ARTHRITIS OF LEFT KNEE: ICD-10-CM

## 2019-12-12 DIAGNOSIS — Z96.652 S/P TOTAL KNEE ARTHROPLASTY, LEFT: Primary | ICD-10-CM

## 2019-12-12 PROCEDURE — 97110 THERAPEUTIC EXERCISES: CPT | Performed by: PHYSICAL THERAPIST

## 2019-12-12 PROCEDURE — 97112 NEUROMUSCULAR REEDUCATION: CPT | Performed by: PHYSICAL THERAPIST

## 2019-12-12 PROCEDURE — 97140 MANUAL THERAPY 1/> REGIONS: CPT | Performed by: PHYSICAL THERAPIST

## 2019-12-12 NOTE — PROGRESS NOTES
Daily Note     Today's date: 2019  Patient name: Gabriel Lazar  : 1945  MRN: [id number]  Referring provider: Abigail Moncada  Dx:   Encounter Diagnosis     ICD-10-CM    1  S/P total knee arthroplasty, left Z96 652    2  Arthritis of left knee M17 12        Start Time: 0800  Stop Time: 0845  Total time in clinic (min): 45 minutes    Subjective: Pt reports improvements in hip symptoms  Objective: See treatment diary below      Assessment: Tolerated treatment well  Pt's POC was progressed to include more closed chain interventions to improve quad activation to increase tolerance for community ambulation  Patient demonstrated fatigue post treatment and would benefit from continued PT      Plan: Continue per plan of care        Precautions: s/p L TKA    Daily Treatment Diary     Manual       Left Knee PROM and patellar ROM MARY JANE HINTON JF                                                             Exercise Diary       Bike 5' 5' 5' 5' 5' 5' 5' 7'     Quad set 3*10*5" 30*5" 30*5" 30*5" 30*5" 20*5"       Heel slides 30*5" 30*5" 30*5" 30*5" 30*5"  30*5" 30*5"     SLR 3*10 3*10 3*10 3*10 3*10 3*10 3*10 3*10 2#     Bridges nv    3*10 3*10 3*10 3*10     clamshells nv      3*10 3*10     Step ups nv 3*10 3*10 3*10 3*10 3*10 3*10 3*10     STS D/c 2*10  3*10 3*10 3*10 3*10 3*10 3*10     HS stretch D/c       30*5"     LAQ nv 3*10 3*10 3*10  3*10       HS curls nv 3*10           Mini squats (pain free) 3*10 3*10 3*10 3*10 3*10 4*10 4*10      Gait training PRN nv   10 min 10 min        Wall ball    2*10*5" 2*10*5" 3*10*5" 3*10*5" 3*10*5 CC 20#     Quad eccentrics step foam        3*10                                                                          Modalities              CP PRN

## 2019-12-18 ENCOUNTER — OFFICE VISIT (OUTPATIENT)
Dept: PHYSICAL THERAPY | Age: 74
End: 2019-12-18
Payer: MEDICARE

## 2019-12-18 DIAGNOSIS — Z96.652 S/P TOTAL KNEE ARTHROPLASTY, LEFT: Primary | ICD-10-CM

## 2019-12-18 DIAGNOSIS — M17.12 ARTHRITIS OF LEFT KNEE: ICD-10-CM

## 2019-12-18 PROCEDURE — 97112 NEUROMUSCULAR REEDUCATION: CPT | Performed by: PHYSICAL THERAPIST

## 2019-12-18 PROCEDURE — 97110 THERAPEUTIC EXERCISES: CPT | Performed by: PHYSICAL THERAPIST

## 2019-12-18 PROCEDURE — 97140 MANUAL THERAPY 1/> REGIONS: CPT | Performed by: PHYSICAL THERAPIST

## 2019-12-18 NOTE — PROGRESS NOTES
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 and L knee is bothersome at times      Objective: See treatment diary below      Assessment: Tolerated treatment well  Patient demonstrated fatigue post treatment and would benefit from continued PT      Plan: Continue per plan of care  Progress treatment as tolerated         Precautions: s/p L TKA    Daily Treatment Diary     Manual  19    Left Knee PROM and patellar ROM MARY JANE HINTON JF VK                                                            Exercise Diary  19    Bike 5' 5' 5' 5' 5' 5' 5' 7'     Quad set 3*10*5" 30*5" 30*5" 30*5" 30*5" 20*5"       Heel slides 30*5" 30*5" 30*5" 30*5" 30*5"  30*5" 30*5" 30x5"    SLR 3*10 3*10 3*10 3*10 3*10 3*10 3*10 3*10 2# 2# 3x10    Bridges nv    3*10 3*10 3*10 3*10 3x10    clamshells nv      3*10 3*10 3x10    Step ups nv 3*10 3*10 3*10 3*10 3*10 3*10 3*10 3x10    STS D/c 2*10  3*10 3*10 3*10 3*10 3*10 3*10 3x10    HS stretch D/c       30*5" 5x30"    LAQ nv 3*10 3*10 3*10  3*10       HS curls nv 3*10           Mini squats (pain free) 3*10 3*10 3*10 3*10 3*10 4*10 4*10      Gait training PRN nv   10 min 10 min        Wall ball    2*10*5" 2*10*5" 3*10*5" 3*10*5" 3*10*5 CC 20# 20# 3x10 CC    Quad eccentrics step foam        3*10                                                                          Modalities              CP PRN

## 2019-12-18 NOTE — PROGRESS NOTES
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 and L knee is bothersome at times      Objective: See treatment diary below      Assessment: Tolerated treatment well  Patient demonstrated fatigue post treatment and would benefit from continued PT      Plan: Continue per plan of care  Progress treatment as tolerated         Precautions: s/p L TKA    Daily Treatment Diary     Manual  19    Left Knee PROM and patellar ROM MARY JANE HINTON VK                                                            Exercise Diary  19    Bike 5' 5' 5' 5' 5' 5' 5' 7'     Quad set 3*10*5" 30*5" 30*5" 30*5" 30*5" 20*5"       Heel slides 30*5" 30*5" 30*5" 30*5" 30*5"  30*5" 30*5" 30x5"    SLR 3*10 3*10 3*10 3*10 3*10 3*10 3*10 3*10 2# 2# 3x10    Bridges nv    3*10 3*10 3*10 3*10 3x10    clamshells nv      3*10 3*10 3x10    Step ups nv 3*10 3*10 3*10 3*10 3*10 3*10 3*10 3x10    STS D/c 2*10  3*10 3*10 3*10 3*10 3*10 3*10 3x10    HS stretch D/c       30*5" 5x30"    LAQ nv 3*10 3*10 3*10  3*10       HS curls nv 3*10           Mini squats (pain free) 3*10 3*10 3*10 3*10 3*10 4*10 4*10      Gait training PRN nv   10 min 10 min        Wall ball    2*10*5" 2*10*5" 3*10*5" 3*10*5" 3*10*5 CC 20# 20# 3x10 CC    Quad eccentrics step foam        3*10                                                                          Modalities              CP PRN

## 2019-12-19 ENCOUNTER — EVALUATION (OUTPATIENT)
Dept: PHYSICAL THERAPY | Age: 74
End: 2019-12-19
Payer: MEDICARE

## 2019-12-19 DIAGNOSIS — Z96.652 S/P TOTAL KNEE ARTHROPLASTY, LEFT: Primary | ICD-10-CM

## 2019-12-19 DIAGNOSIS — M17.12 ARTHRITIS OF LEFT KNEE: ICD-10-CM

## 2019-12-19 PROCEDURE — 97110 THERAPEUTIC EXERCISES: CPT | Performed by: PHYSICAL THERAPIST

## 2019-12-19 PROCEDURE — 97140 MANUAL THERAPY 1/> REGIONS: CPT | Performed by: PHYSICAL THERAPIST

## 2019-12-19 PROCEDURE — 97112 NEUROMUSCULAR REEDUCATION: CPT | Performed by: PHYSICAL THERAPIST

## 2019-12-19 NOTE — PROGRESS NOTES
PT D/C PT    Today's date: 2019  Patient name: Cathy Pena  : 1945  MRN: [id number]  Referring provider: Ziyad Bach  Dx:   Encounter Diagnosis     ICD-10-CM    1  S/P total knee arthroplasty, left [id number]                   Assessment  Assessment details: Patient presents with improvements in pain, ROM, strength,  LE flexibility, and  function secondary since IE  Pt feels confident with interventions and has demonstrated satisfactory progress at this time and will be discharged to fitness program      Impairments: abnormal gait, abnormal or restricted ROM, activity intolerance, impaired balance, impaired physical strength, lacks appropriate home exercise program, pain with function and weight-bearing intolerance  Understanding of Dx/Px/POC: good   Prognosis: good    Goals  Short term goals - to be achieved in 4 weeks:  MET   Decrease pain 20-50%  Increase strength by 1/2 grade  Improve range of motion by 25%  Long term goals - to be achieved by discharge:  MET   Ambulation is improved to maximal level of function  Squatting is improved to maximal level of function  Stair climbing is improved to maximal level of function  IADL performance in related activities is improved to maximal level of function  Performance in related household activities is improved to maximal level of function  Plan  Plan details: Patient was educated in [employer]  All questions were answered to pt's satisfaction      Patient would benefit from: skilled physical therapy  Planned modality interventions: cryotherapy  Planned therapy interventions: manual therapy, joint mobilization, neuromuscular re-education, balance, patient education, therapeutic activities, flexibility, therapeutic exercise, gait training and home exercise program  Frequency: D/C PT        Subjective Evaluation    History of Present Illness  Mechanism of injury: Pt is a 69 y o female who underwent L TKA on 10/24/2019  Pt denies complications with the surgery and was d/c home the next day  Pt is now referred for OPPT  Pt is scheduled to see Surgeon on 19  Pt is currently staying at her Daughter's house  Pt reports pain/difficulty with full weight bearing on left LE, ambulation (currentlyusing RW at all times and the goal is to not use an assistive device), household activities, sleep (currently using recliner), showering (not yet and is currently sponge bathing)  Pt only has 1 step to get in/out of the house and has been doing the same at her daughter's house  Pt would like to resume some gardening; taking care of her yardwork  Pain  At best pain ratin  At worst pain rating: 3  Location: left knee  Relieving factors: medications and ice    Treatments  Discharged from (in last 30 days): inpatient hospitalization  Patient Goals  Patient goals for therapy: decreased pain, increased motion, improved balance, decreased edema, increased strength, independence with ADLs/IADLs and return to sport/leisure activities          Objective     Observations     Additional Observation Details  Incision C/D/I with staples intact  No s/s of infection  Tenderness     Additional Tenderness Details  Mild TTP diffusely t/o knee    Neurological Testing     Additional Neurological Details  Pt denies N/T    Active Range of Motion   Left Knee   Flexion: 130 degrees with pain  Extension: 8 degrees with pain    Passive Range of Motion   Left Knee   Flexion: 135 degrees   Extension: 2 degrees     Mobility   Patellar Mobility:   Left Knee   Hypomobile: left superior and left inferior    Strength/Myotome Testing     Left Hip   Planes of Motion   Flexion: 4+    Left Knee   Flexion: 4+  Extension: 4  Quadriceps contraction: fair    Left Ankle/Foot   Dorsiflexion: 5    Tests     Additional Tests Details  (-)homans    General Comments:      Knee Comments  Decreased flexibility gastroc, HS               Precautions: s/p L TKA    Daily Treatment Diary       Manual  11/5 11/7 11/12 11/18 11/21 11/25 12/5 12/19     Left Knee PROM and patellar ROM JF MARY JANE HINTON JF JF JF JF JF                                                             Exercise Diary  11/5 11/7 11/12 11/18 11/21 11/25 12/19      Bike 5' 5' 5' 5' 5' 5' 5'      Quad set 3*10*5" 30*5" 30*5" 30*5" 30*5" 20*5"       Heel slides 30*5" 30*5" 30*5" 30*5" 30*5"  30*5"      SLR 3*10 3*10 3*10 3*10 3*10 3*10 3*10      Bridges nv    3*10 3*10       clamshells nv            Step ups nv 3*10 3*10 3*10 3*10 3*10 3*10      STS D/c 2*10  3*10 3*10 3*10 3*10 3*10      HS stretch D/c            LAQ nv 3*10 3*10 3*10  3*10       HS curls nv 3*10           Mini squats (pain free) 3*10 3*10 3*10 3*10 3*10 4*10 4*10      Gait training PRN nv   10 min 10 min        Wall ball    2*10*5" 2*10*5" 3*10*5" 3*10*5"      Leg                                                                                  Modalities              CP PRN

## 2019-12-20 ENCOUNTER — APPOINTMENT (OUTPATIENT)
Dept: PHYSICAL THERAPY | Age: 74
End: 2019-12-20
Payer: MEDICARE

## 2020-01-07 NOTE — TELEPHONE ENCOUNTER
Only 30 days filled of medication  Please call pt and have her schedule f/u appt for additional refills  Lab order in chart and must be completed before next appt  Thank you! Mr. Prado is a 52 year old male with ESRD on HD, now s/p ddrt on 12/8/2019 course complicated by TMA/profound ATN, who presents from a HD center with a fever and cough. He was initially admitted with influenza. He is s/p washout of infected collection and biopsy which revealed ATN.   He is now found to have a diffusely enlarging perinephric fluid collection and is awaiting drainage.    While on telemetry, he had an episode of a wide complex tachycardia. It initially appears irregular, suggesting an atrial arrhythmia with aberrancy, though the remainder appears more like an episode of non-sustained VT.  Regardless, has been on telemetry since, without any further events.    - continue to watch on telemetry  - if tolerated by his BP, would restart his lopressor at 12.5 mg po bid.  As of now, his BP remains too borderline to start this.  - watch creatinine and electrolytes. Keep K>4, Mg>2  - check echocardiogram. Last echocardiogram in 1/2019 with normal LV function   - the arrythmia likely occurred in the setting of his fever and infection.    - no sign of acute ischemia. He had a pharm stress test in 2018 that showed no evidence of ischemia  - s/p IR drainage.  Tolerated procedure well.  - will follow with you

## 2020-01-17 ENCOUNTER — APPOINTMENT (OUTPATIENT)
Dept: RADIOLOGY | Facility: MEDICAL CENTER | Age: 75
End: 2020-01-17
Payer: MEDICARE

## 2020-01-17 ENCOUNTER — OFFICE VISIT (OUTPATIENT)
Dept: OBGYN CLINIC | Facility: MEDICAL CENTER | Age: 75
End: 2020-01-17

## 2020-01-17 VITALS
WEIGHT: 164.2 LBS | SYSTOLIC BLOOD PRESSURE: 127 MMHG | BODY MASS INDEX: 28.03 KG/M2 | HEIGHT: 64 IN | HEART RATE: 90 BPM | DIASTOLIC BLOOD PRESSURE: 90 MMHG

## 2020-01-17 DIAGNOSIS — Z96.652 STATUS POST TOTAL LEFT KNEE REPLACEMENT: ICD-10-CM

## 2020-01-17 DIAGNOSIS — Z96.652 STATUS POST TOTAL LEFT KNEE REPLACEMENT: Primary | ICD-10-CM

## 2020-01-17 PROCEDURE — 73560 X-RAY EXAM OF KNEE 1 OR 2: CPT

## 2020-01-17 PROCEDURE — 99024 POSTOP FOLLOW-UP VISIT: CPT | Performed by: ORTHOPAEDIC SURGERY

## 2020-01-17 RX ORDER — ASCORBIC ACID 500 MG
500 TABLET ORAL DAILY
COMMUNITY
End: 2021-08-31 | Stop reason: ALTCHOICE

## 2020-01-17 RX ORDER — CEPHALEXIN 500 MG/1
CAPSULE ORAL
Qty: 12 CAPSULE | Refills: 3 | Status: SHIPPED | OUTPATIENT
Start: 2020-01-17 | End: 2020-01-24

## 2020-01-17 NOTE — PROGRESS NOTES
Subjective;     Three month follow-up status post left total knee replacement  This adult female comes the office today with no need for an assistant device for ambulation no need for a brace or knee sleeve and no need for medication for pain relief  Cee asked about the health and wellness of her left total knee she beam smiles and is enjoying freedom from her preprocedural pain    X-rays were obtained on arrival to the office    Past Medical History:   Diagnosis Date    Hyperlipidemia     Skin tag     last assessed 17, resolved 10/2/17        Past Surgical History:   Procedure Laterality Date    COLONOSCOPY      KNEE ARTHROSCOPY Left     KNEE ARTHROSCOPY W/ MENISCAL REPAIR Left     GA TOTAL KNEE ARTHROPLASTY Left 10/24/2019    Procedure: ARTHROPLASTY KNEE TOTAL;  Surgeon: Candido Thorpe MD;  Location: [facility];  Service: Orthopedics    TONSILLECTOMY AND ADENOIDECTOMY         Family History   Problem Relation Age of Onset    Stroke Mother         CVA    Hyperlipidemia Mother     Stroke Maternal Grandfather         CVA    Stroke Family         CVA    Other Family         breast disorders    Prostate cancer Father 76    Other Maternal Aunt         breast disorders        Social History     Tobacco Use    Smoking status: Former Smoker     Types: Cigarettes     Last attempt to quit: 1980     Years since quittin 0    Smokeless tobacco: Never Used   Substance Use Topics    Alcohol use: Yes     Frequency: Monthly or less     Binge frequency: Less than monthly    Drug use: No     Exam;    Again this is an adult female who smiles is in no acute distress and quite comfortable seated on the exam table  She has a vertical in line incision that is mature  She has left knee that extends fully actively and without extension lag or contracture  She flexes past 115° easily  She exhibits no mid flexion arc instability to varus or valgus stress  She has return of calf size definition and no swelling      X-ray left knee series two views, shows total knee components in appropriate position and no sequelae    Impression;     Follow-up for left total knee replacement    Plan;    She was given a joint recipient identification card  She was provided dental antibiotic prophylaxis medication  She is allowed to walk and use the knee as desired  Her next follow-up would be in 3 additional months x-rays left knee would be appropriate at that time  She understands that she has a 2 year commitment antibiotics for dental prophylaxis regarding this joint replacement    Her experience was supervised by and plan formulated by the attending surgeon it was my privilege to assist him in its delivery

## 2020-03-04 DIAGNOSIS — F41.9 ANXIETY: ICD-10-CM

## 2020-03-04 DIAGNOSIS — E78.5 HYPERLIPIDEMIA, UNSPECIFIED HYPERLIPIDEMIA TYPE: ICD-10-CM

## 2020-03-04 RX ORDER — SERTRALINE HYDROCHLORIDE 100 MG/1
100 TABLET, FILM COATED ORAL
Qty: 90 TABLET | Refills: 1 | Status: SHIPPED | OUTPATIENT
Start: 2020-03-04 | End: 2020-09-01 | Stop reason: SDUPTHER

## 2020-03-04 RX ORDER — SIMVASTATIN 20 MG
20 TABLET ORAL
Qty: 90 TABLET | Refills: 1 | Status: SHIPPED | OUTPATIENT
Start: 2020-03-04 | End: 2020-09-01 | Stop reason: SDUPTHER

## 2020-03-16 ENCOUNTER — TELEPHONE (OUTPATIENT)
Dept: INTERNAL MEDICINE CLINIC | Age: 75
End: 2020-03-16

## 2020-03-16 ENCOUNTER — OFFICE VISIT (OUTPATIENT)
Dept: INTERNAL MEDICINE CLINIC | Age: 75
End: 2020-03-16
Payer: MEDICARE

## 2020-03-16 VITALS
SYSTOLIC BLOOD PRESSURE: 102 MMHG | HEIGHT: 65 IN | TEMPERATURE: 97.8 F | DIASTOLIC BLOOD PRESSURE: 70 MMHG | OXYGEN SATURATION: 97 % | BODY MASS INDEX: 27.59 KG/M2 | HEART RATE: 96 BPM | WEIGHT: 165.6 LBS

## 2020-03-16 DIAGNOSIS — G56.03 BILATERAL CARPAL TUNNEL SYNDROME: ICD-10-CM

## 2020-03-16 DIAGNOSIS — F32.89 OTHER DEPRESSION: ICD-10-CM

## 2020-03-16 DIAGNOSIS — H61.22 EXCESSIVE CERUMEN IN LEFT EAR CANAL: ICD-10-CM

## 2020-03-16 DIAGNOSIS — E78.00 HYPERCHOLESTEROLEMIA: Primary | ICD-10-CM

## 2020-03-16 DIAGNOSIS — K21.9 GASTROESOPHAGEAL REFLUX DISEASE WITHOUT ESOPHAGITIS: ICD-10-CM

## 2020-03-16 DIAGNOSIS — R73.09 ABNORMAL GLUCOSE: ICD-10-CM

## 2020-03-16 DIAGNOSIS — H61.21 IMPACTED CERUMEN OF RIGHT EAR: Primary | ICD-10-CM

## 2020-03-16 PROCEDURE — 4040F PNEUMOC VAC/ADMIN/RCVD: CPT | Performed by: INTERNAL MEDICINE

## 2020-03-16 PROCEDURE — 1160F RVW MEDS BY RX/DR IN RCRD: CPT | Performed by: INTERNAL MEDICINE

## 2020-03-16 PROCEDURE — 1036F TOBACCO NON-USER: CPT | Performed by: INTERNAL MEDICINE

## 2020-03-16 PROCEDURE — 99213 OFFICE O/P EST LOW 20 MIN: CPT | Performed by: INTERNAL MEDICINE

## 2020-03-16 PROCEDURE — 3008F BODY MASS INDEX DOCD: CPT | Performed by: INTERNAL MEDICINE

## 2020-03-16 PROCEDURE — 69210 REMOVE IMPACTED EAR WAX UNI: CPT | Performed by: INTERNAL MEDICINE

## 2020-03-16 NOTE — PROGRESS NOTES
Assessment/Plan:    Impacted cerumen of right ear and excessive cerumen in left ear canal  - ear lavage done with improvement in hearing    Ear cerumen removal  Date/Time: 3/16/2020 3:41 PM  Performed by: Preet Hearn DO  Authorized by: Preet Hearn DO     Patient location:  Clinic  Other Assisting Provider: No    Consent:     Consent obtained:  Verbal    Consent given by:  Patient    Risks discussed:  Pain, incomplete removal, dizziness, TM perforation, infection and bleeding    Alternatives discussed:  No treatment and delayed treatment  Universal protocol:     Patient identity confirmed:  Verbally with patient  Procedure details:     Local anesthetic:  None    Location:  L ear and R ear    Procedure type: curette      Procedure type comment:  Irrigation with curette use    Approach:  Natural orifice  Post-procedure details:     Complication:  None    Hearing quality:  Improved    Patient tolerance of procedure: Tolerated well, no immediate complications        Bilateral carpal tunnel syndrome  - patient was counseled to obtain a wrist splint to be used at night  -follow-up with orthopedic surgery/Hand surgery     Diagnoses and all orders for this visit:    Impacted cerumen of right ear    Excessive cerumen in left ear canal    Bilateral carpal tunnel syndrome    Other orders  -     Ear cerumen removal          Subjective:      Patient ID: Pato Youssef is a 69 y o  female  HPI  Patient presents complaints of poor hearing in her ears, right worse than left  She states that she has a history of cerumen impaction and usually gets her ears cleaned about once a year, usually about this time of the year  She states that she has been using the Debrox eardrops for about 6-7 days    She admits to a clogged sensation in her ears and  but denies ear ache, tinnitus, sore throat, cough fever, chills, night sweats, chest pain, shortness of breath, palpitations, nausea, vomiting abdominal pain, diarrhea, constipation  She does not smoke when she has no exposure to secondhand smoke  Patient also complains of numbness of the fingers of her bilateral hands start been going on for months  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[address] where she keeps making repetitive motions with her hands  She states that the numbness is worse at night when she is sleeping and usually wakes her up  it resolves when she shakes off her hands  Symptoms are worse on the right hand compared to the left  Of note, she is right-handed  She states that she has an appointment with her orthopedic surgeon next week  The following portions of the patient's history were reviewed and updated as appropriate:   She  has a past medical history of Hyperlipidemia and Skin tag  She   Patient Active Problem List    Diagnosis Date Noted    Excessive cerumen in left ear canal 03/16/2020    Bilateral carpal tunnel syndrome 03/16/2020    Status post total left knee replacement 10/24/2019    Trochanteric bursitis, left hip 07/19/2019    History of arthroscopy of left knee 05/31/2019    Impacted cerumen of right ear 01/11/2019    Gastroesophageal reflux disease without esophagitis 02/05/2016    Rosacea 07/02/2014    Hypercholesterolemia 10/22/2013    Depression 10/22/2013     She  has a past surgical history that includes Tonsillectomy and adenoidectomy; Knee arthroscopy w/ meniscal repair (Left, 2000); Colonoscopy; Knee arthroscopy (Left); and pr total knee arthroplasty (Left, 10/24/2019)  Her family history includes Hyperlipidemia in her mother; Other in her family and maternal aunt; Prostate cancer (age of onset: 76) in her father; Stroke in her family, maternal grandfather, and mother  She  reports that she quit smoking about 40 years ago  Her smoking use included cigarettes  She has never used smokeless tobacco  She reports that she drinks alcohol  She reports that she does not use drugs    Current Outpatient Medications   Medication Sig Dispense Refill    ALPRAZolam (XANAX) 0 5 mg tablet Take 1 tablet (0 5 mg total) by mouth daily as needed (prn) 30 tablet 1    ascorbic acid (VITAMIN C) 500 mg tablet Take 500 mg by mouth daily      B Complex Vitamins (B COMPLEX PO) Take 1 capsule by mouth daily       CALCIUM PO Take 1 capsule by mouth daily       Cholecalciferol (VITAMIN D3 PO) Take 1 capsule by mouth daily       sertraline (ZOLOFT) 100 mg tablet Take 1 tablet (100 mg total) by mouth daily at bedtime 90 tablet 1    simvastatin (ZOCOR) 20 mg tablet Take 1 tablet (20 mg total) by mouth daily at bedtime 90 tablet 1     No current facility-administered medications for this visit  Current Outpatient Medications on File Prior to Visit   Medication Sig    ALPRAZolam (XANAX) 0 5 mg tablet Take 1 tablet (0 5 mg total) by mouth daily as needed (prn)    ascorbic acid (VITAMIN C) 500 mg tablet Take 500 mg by mouth daily    B Complex Vitamins (B COMPLEX PO) Take 1 capsule by mouth daily     CALCIUM PO Take 1 capsule by mouth daily     Cholecalciferol (VITAMIN D3 PO) Take 1 capsule by mouth daily     sertraline (ZOLOFT) 100 mg tablet Take 1 tablet (100 mg total) by mouth daily at bedtime    simvastatin (ZOCOR) 20 mg tablet Take 1 tablet (20 mg total) by mouth daily at bedtime     No current facility-administered medications on file prior to visit  She is allergic to tetracyclines & related       Review of Systems   Constitutional: Negative for activity change, chills, fatigue, fever and unexpected weight change  HENT: Positive for hearing loss (right worse than left)  Negative for ear pain, postnasal drip, rhinorrhea, sinus pressure and sore throat  Eyes: Positive for itching  Negative for pain  Tearing     Respiratory: Negative for cough, choking, chest tightness, shortness of breath and wheezing  Cardiovascular: Negative for chest pain, palpitations and leg swelling     Gastrointestinal: Negative for abdominal pain, constipation, diarrhea, nausea and vomiting  Genitourinary: Negative for dysuria and hematuria  Musculoskeletal: Negative for arthralgias, back pain, gait problem, joint swelling, myalgias and neck stiffness  Skin: Negative for pallor and rash  Neurological: Negative for dizziness, tremors, seizures, syncope, light-headedness and headaches  Hematological: Negative for adenopathy  Psychiatric/Behavioral: Negative for behavioral problems  Objective:      /70 (BP Location: Left arm, Patient Position: Sitting, Cuff Size: Adult)   Pulse 96   Temp 97 8 °F (36 6 °C) (Tympanic)   Ht 5' 4 65" (1 642 m)   Wt 75 1 kg (165 lb 9 6 oz)   SpO2 97%   BMI 27 86 kg/m²          Physical Exam   Constitutional: She is oriented to person, place, and time  She appears well-developed and well-nourished  No distress  HENT:   Head: Normocephalic and atraumatic  Right Ear: External ear normal  A foreign body (cerumen impaction) is present  Left Ear: External ear normal  A foreign body is present  Nose: Nose normal    Mouth/Throat: Oropharynx is clear and moist  Mucous membranes are dry  No oropharyngeal exudate  Eyes: Pupils are equal, round, and reactive to light  Conjunctivae and EOM are normal  Right eye exhibits no discharge  Left eye exhibits no discharge  No scleral icterus  Neck: Normal range of motion  Neck supple  No JVD present  No tracheal deviation present  No thyromegaly present  Cardiovascular: Normal rate, regular rhythm and intact distal pulses  Exam reveals no gallop and no friction rub  Murmur (2/6 systolic mumur maximal in aortic valve regin and radiating to the carotid) heard  Systolic murmur is present with a grade of 2/6  Pulmonary/Chest: Effort normal and breath sounds normal  No respiratory distress  She has no wheezes  She has no rales  She exhibits no tenderness  Abdominal: Soft   Bowel sounds are normal  She exhibits no distension and no mass  There is no tenderness  There is no rebound and no guarding  Musculoskeletal: Normal range of motion  She exhibits no edema or deformity  Right wrist: She exhibits tenderness (Positive Tinel sign at the right median nerve on the wrist with positive Phalen sign)  Left wrist: She exhibits tenderness (Positive Tinel sign on the left median nerve at the wrist with positive Phalen sign)  Lymphadenopathy:     She has no cervical adenopathy  Neurological: She is alert and oriented to person, place, and time  She has normal reflexes  No cranial nerve deficit  She exhibits normal muscle tone  Coordination normal    Skin: Skin is warm and dry  No rash noted  She is not diaphoretic  No erythema  No pallor  Psychiatric: She has a normal mood and affect  Her behavior is normal            Admission on 10/24/2019, Discharged on 10/25/2019   Component Date Value Ref Range Status    ABO Grouping 10/09/2019 O   Final    Rh Factor 10/09/2019 Positive   Final    Antibody Screen 10/09/2019 Negative   Final    Specimen Expiration Date 10/09/2019 [id number]   Final    Sodium 10/25/2019 139  136 - 145 mmol/L Final    Potassium 10/25/2019 4 0  3 5 - 5 3 mmol/L Final    Chloride 10/25/2019 109* 100 - 108 mmol/L Final    CO2 10/25/2019 25  21 - 32 mmol/L Final    ANION GAP 10/25/2019 5  4 - 13 mmol/L Final    BUN 10/25/2019 9  5 - 25 mg/dL Final    Creatinine 10/25/2019 0 56* 0 60 - 1 30 mg/dL Final    Standardized to IDMS reference method    Glucose 10/25/2019 137  65 - 140 mg/dL Final      If the patient is fasting, the ADA then defines impaired fasting glucose as > 100 mg/dL and diabetes as > or equal to 123 mg/dL  Specimen collection should occur prior to Sulfasalazine administration due to the potential for falsely depressed results  Specimen collection should occur prior to Sulfapyridine administration due to the potential for falsely elevated results      Calcium 10/25/2019 8 6  8 3 - 10 1 mg/dL Final    eGFR 10/25/2019 93  ml/min/1 73sq m Final    WBC 10/25/2019 14 81* 4 31 - 10 16 Thousand/uL Final    RBC 10/25/2019 3 60* 3 81 - 5 12 Million/uL Final    Hemoglobin 10/25/2019 11 4* 11 5 - 15 4 g/dL Final    Hematocrit 10/25/2019 35 1  34 8 - 46 1 % Final    MCV 10/25/2019 98  82 - 98 fL Final    MCH 10/25/2019 31 7  26 8 - 34 3 pg Final    MCHC 10/25/2019 32 5  31 4 - 37 4 g/dL Final    RDW 10/25/2019 12 3  11 6 - 15 1 % Final    Platelets 72/89/8551 189  149 - 390 Thousands/uL Final    MPV 10/25/2019 10 7  8 9 - 12 7 fL Final   Lab on 10/09/2019   Component Date Value Ref Range Status    Sodium 10/09/2019 140  136 - 145 mmol/L Final    Potassium 10/09/2019 3 9  3 5 - 5 3 mmol/L Final    Chloride 10/09/2019 110* 100 - 108 mmol/L Final    CO2 10/09/2019 26  21 - 32 mmol/L Final    ANION GAP 10/09/2019 4  4 - 13 mmol/L Final    BUN 10/09/2019 13  5 - 25 mg/dL Final    Creatinine 10/09/2019 0 59* 0 60 - 1 30 mg/dL Final    Standardized to IDMS reference method    Glucose, Fasting 10/09/2019 111* 65 - 99 mg/dL Final      Specimen collection should occur prior to Sulfasalazine administration due to the potential for falsely depressed results  Specimen collection should occur prior to Sulfapyridine administration due to the potential for falsely elevated results   Calcium 10/09/2019 9 6  8 3 - 10 1 mg/dL Final    AST 10/09/2019 16  5 - 45 U/L Final      Specimen collection should occur prior to Sulfasalazine administration due to the potential for falsely depressed results   ALT 10/09/2019 25  12 - 78 U/L Final      Specimen collection should occur prior to Sulfasalazine and/or Sulfapyridine administration due to the potential for falsely depressed results       Alkaline Phosphatase 10/09/2019 49  46 - 116 U/L Final    Total Protein 10/09/2019 7 1  6 4 - 8 2 g/dL Final    Albumin 10/09/2019 3 8  3 5 - 5 0 g/dL Final    Total Bilirubin 10/09/2019 0 53  0 20 - 1 00 mg/dL Final  eGFR 10/09/2019 92  ml/min/1 73sq m Final    WBC 10/09/2019 7 09  4 31 - 10 16 Thousand/uL Final    RBC 10/09/2019 4 40  3 81 - 5 12 Million/uL Final    Hemoglobin 10/09/2019 13 8  11 5 - 15 4 g/dL Final    Hematocrit 10/09/2019 43 2  34 8 - 46 1 % Final    MCV 10/09/2019 98  82 - 98 fL Final    MCH 10/09/2019 31 4  26 8 - 34 3 pg Final    MCHC 10/09/2019 31 9  31 4 - 37 4 g/dL Final    RDW 10/09/2019 12 5  11 6 - 15 1 % Final    MPV 10/09/2019 11 2  8 9 - 12 7 fL Final    Platelets 99/44/8198 206  149 - 390 Thousands/uL Final    nRBC 10/09/2019 0  /100 WBCs Final    Neutrophils Relative 10/09/2019 71  43 - 75 % Final    Immat GRANS % 10/09/2019 0  0 - 2 % Final    Lymphocytes Relative 10/09/2019 20  14 - 44 % Final    Monocytes Relative 10/09/2019 6  4 - 12 % Final    Eosinophils Relative 10/09/2019 2  0 - 6 % Final    Basophils Relative 10/09/2019 1  0 - 1 % Final    Neutrophils Absolute 10/09/2019 5 04  1 85 - 7 62 Thousands/µL Final    Immature Grans Absolute 10/09/2019 0 03  0 00 - 0 20 Thousand/uL Final    Lymphocytes Absolute 10/09/2019 1 40  0 60 - 4 47 Thousands/µL Final    Monocytes Absolute 10/09/2019 0 42  0 17 - 1 22 Thousand/µL Final    Eosinophils Absolute 10/09/2019 0 16  0 00 - 0 61 Thousand/µL Final    Basophils Absolute 10/09/2019 0 04  0 00 - 0 10 Thousands/µL Final    CRP 10/09/2019 <3 0  <3 0 mg/L Final    Protime 10/09/2019 12 6  11 6 - 14 5 seconds Final    INR 10/09/2019 0 98  0 84 - 1 19 Final    PTT 10/09/2019 28  23 - 37 seconds Final    Therapeutic Heparin Range =  60-90 seconds    Iron Saturation 10/09/2019 46  % Final    TIBC 10/09/2019 274  250 - 450 ug/dL Final    Iron 10/09/2019 127  50 - 170 ug/dL Final      Patients treated with metal-binding drugs (ie  Deferoxamine) may have depressed iron values      Ferritin 10/09/2019 159  8 - 388 ng/mL Final    Ventricular Rate 10/09/2019 78  BPM Final    Atrial Rate 10/09/2019 78  BPM Final    TX Interval 10/09/2019 154  ms Final    QRSD Interval 10/09/2019 80  ms Final    QT Interval 10/09/2019 420  ms Final    QTC Interval 10/09/2019 478  ms Final    P Axis 10/09/2019 48  degrees Final    QRS Axis 10/09/2019 54  degrees Final    T Wave [place] 10/09/2019 29  degrees Final   Appointment on 10/09/2019   Component Date Value Ref Range Status    Hemoglobin A1C 10/09/2019 5 7  4 2 - 6 3 % Final    EAG 10/09/2019 117  mg/dl Final   Appointment on 08/12/2019   Component Date Value Ref Range Status    Cholesterol 08/12/2019 187  50 - 200 mg/dL Final      Cholesterol:       Desirable         <200 mg/dl       Borderline         200-239 mg/dl       High              >239           Triglycerides 08/12/2019 109  <=150 mg/dL Final      Triglyceride:     Normal          <150 mg/dl     Borderline High 150-199 mg/dl     High            200-499 mg/dl        Very High       >499 mg/dl    Specimen collection should occur prior to N-Acetylcysteine or Metamizole administration due to the potential for falsely depressed results   HDL, Direct 08/12/2019 72* 40 - 60 mg/dL Final      HDL Cholesterol:       High    >60 mg/dL       Low     <41 mg/dL  Specimen collection should occur prior to Metamizole administration due to the potential for falsley depressed results   LDL Calculated 08/12/2019 93  0 - 100 mg/dL Final      LDL Cholesterol:     Optimal           <100 mg/dl     Near Optimal      100-129 mg/dl     Above Optimal       Borderline High 130-159 mg/dl       High            160-189 mg/dl       Very High       >189 mg/dl         This screening LDL is a calculated result  It does not have the accuracy of the Direct Measured LDL in the monitoring of patients with hyperlipidemia and/or statin therapy  Direct Measure LDL (KCG928) must be ordered separately in these patients      Non-HDL-Chol (CHOL-HDL) 08/12/2019 115  mg/dl Final    Sodium 08/12/2019 144  136 - 145 mmol/L Final    Potassium 08/12/2019 4 0  3 5 - 5 3 mmol/L Final    Chloride 08/12/2019 108  100 - 108 mmol/L Final    CO2 08/12/2019 27  21 - 32 mmol/L Final    ANION GAP 08/12/2019 9  4 - 13 mmol/L Final    BUN 08/12/2019 15  5 - 25 mg/dL Final    Creatinine 08/12/2019 0 64  0 60 - 1 30 mg/dL Final    Standardized to IDMS reference method    Glucose, Fasting 08/12/2019 98  65 - 99 mg/dL Final      Specimen collection should occur prior to Sulfasalazine administration due to the potential for falsely depressed results  Specimen collection should occur prior to Sulfapyridine administration due to the potential for falsely elevated results   Calcium 08/12/2019 9 5  8 3 - 10 1 mg/dL Final    AST 08/12/2019 17  5 - 45 U/L Final      Specimen collection should occur prior to Sulfasalazine administration due to the potential for falsely depressed results   ALT 08/12/2019 26  12 - 78 U/L Final      Specimen collection should occur prior to Sulfasalazine and/or Sulfapyridine administration due to the potential for falsely depressed results       Alkaline Phosphatase 08/12/2019 63  46 - 116 U/L Final    Total Protein 08/12/2019 7 4  6 4 - 8 2 g/dL Final    Albumin 08/12/2019 4 2  3 5 - 5 0 g/dL Final    Total Bilirubin 08/12/2019 0 47  0 20 - 1 00 mg/dL Final    eGFR 08/12/2019 89  ml/min/1 73sq m Final    Total CK 08/12/2019 122  26 - 192 U/L Final

## 2020-03-27 ENCOUNTER — TELEMEDICINE (OUTPATIENT)
Dept: INTERNAL MEDICINE CLINIC | Age: 75
End: 2020-03-27
Payer: MEDICARE

## 2020-03-27 DIAGNOSIS — G56.03 BILATERAL CARPAL TUNNEL SYNDROME: Primary | ICD-10-CM

## 2020-03-27 PROCEDURE — 99212 OFFICE O/P EST SF 10 MIN: CPT | Performed by: INTERNAL MEDICINE

## 2020-03-27 RX ORDER — GABAPENTIN 100 MG/1
300 CAPSULE ORAL
Qty: 90 CAPSULE | Refills: 1 | Status: SHIPPED | OUTPATIENT
Start: 2020-03-27 | End: 2020-05-28 | Stop reason: SDUPTHER

## 2020-03-27 NOTE — PROGRESS NOTES
Virtual Regular Visit    Carpal tunnel syndrome  - patient should continue wearing her wrist splints especially at night   -she has been counseled that this is  worsened by continuing with movements that trigger the symptoms  She was counseled to try and reduce her activities with her hands  She states that this is difficult for her   -will start patient on gabapentin which may help her nerve pain, at the dose of 100 mg at night and if her symptoms persist, she has been counseled that she may increase to 200 mg at night and finally to 300 mg at night time if she still does not respond   -she continue with multivitamins   -she follow-up with orthopedic surgeon as well as her PCP  Problem List Items Addressed This Visit        Nervous and Auditory    Bilateral carpal tunnel syndrome - Primary    Relevant Medications    gabapentin (NEURONTIN) 100 mg capsule               Reason for visit is numbness of the fingers of the bilateral hands for months  Encounter provider Mariana Muñoz DO    Provider located at [address] [place] [address]      Recent Visits  No visits were found meeting these conditions  Showing recent visits within past 7 days and meeting all other requirements     Today's Visits  Date Type Provider Dept   03/27/20 Telemedicine Mariana Muñoz DO [facility]   Showing today's visits and meeting all other requirements     Future Appointments  No visits were found meeting these conditions  Showing future appointments within next 150 days and meeting all other requirements        After connecting through [employer], the patient was identified by name and date of birth  Abigail Hurtado was informed that this is a telemedicine visit and that the visit is being conducted through [address] [place] [address] and patient was informed that this is not a secure, HIPAA-complaint platform  she agrees to proceed   which may not be secure and therefore, might not be HIPAA-compliant  My office door was closed  No one else was in the room  She acknowledged consent and understanding of privacy and security of the video platform  The patient has agreed to participate and understands they can discontinue the visit at any time  Subjective  Efrain Trimble is a 69 y o  female who complains of numbness of the fingers of her bilateral hands, especially the lateral fingers  Of note, patient had been seen in the office on March 16th, 2020 with similar complaints and testing was positive for Tinel test at the median nerve in the wrist bilaterally as well as a positive Phalen sign bilaterally but she had said at that time that she did have an appointment to see an orthopedic surgeon, Dr China Li  However she states that her symptoms have been bothering her a lot especially at night and with the corona pandemic, she has not been able to follow-up with orthopedic surgeon  She has been using her wrist splints but states that they do not work well enough  She still has to get up at night and shake her hands in other to get numbness and paresthesia to resolve  Of note, 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  She denies fever, chills, night sweats, chest pain, shortness of breath, palpitations, nausea, vomiting abdominal pain, diarrhea, constipation        Past Medical History:   Diagnosis Date    Hyperlipidemia     Skin tag     last assessed 4/21/17, resolved 10/2/17        Past Surgical History:   Procedure Laterality Date    COLONOSCOPY      KNEE ARTHROSCOPY Left     KNEE ARTHROSCOPY W/ MENISCAL REPAIR Left 2000    NC TOTAL KNEE ARTHROPLASTY Left 10/24/2019    Procedure: ARTHROPLASTY KNEE TOTAL;  Surgeon: Roxanne Pacheco MD;  Location: [facility];  Service: Orthopedics    TONSILLECTOMY AND ADENOIDECTOMY         Current Outpatient Medications   Medication Sig Dispense Refill    ALPRAZolam (XANAX) 0 5 mg tablet Take 1 tablet (0 5 mg total) by mouth daily as needed (prn) 30 tablet 1    ascorbic acid (VITAMIN C) 500 mg tablet Take 500 mg by mouth daily      B Complex Vitamins (B COMPLEX PO) Take 1 capsule by mouth daily       CALCIUM PO Take 1 capsule by mouth daily       Cholecalciferol (VITAMIN D3 PO) Take 1 capsule by mouth daily       gabapentin (NEURONTIN) 100 mg capsule Take 3 capsules (300 mg total) by mouth daily at bedtime Pt may take one capsule at night for one week, then take 2 caps at night for one week and then 3 caps at night  90 capsule 1    sertraline (ZOLOFT) 100 mg tablet Take 1 tablet (100 mg total) by mouth daily at bedtime 90 tablet 1    simvastatin (ZOCOR) 20 mg tablet Take 1 tablet (20 mg total) by mouth daily at bedtime 90 tablet 1     No current facility-administered medications for this visit  Allergies   Allergen Reactions    Tetracyclines & Related      Blisters, yeast infection       Review of Systems   Constitutional: Negative for activity change, chills, fatigue, fever and unexpected weight change  HENT: Negative for ear pain, postnasal drip, rhinorrhea, sinus pressure and sore throat  Eyes: Negative for pain  Respiratory: Negative for cough, choking, chest tightness, shortness of breath and wheezing  Cardiovascular: Negative for chest pain, palpitations and leg swelling  Gastrointestinal: Negative for abdominal pain, constipation, diarrhea, nausea and vomiting  Genitourinary: Negative for dysuria and hematuria  Musculoskeletal: Negative for arthralgias, back pain, gait problem, joint swelling, myalgias and neck stiffness  Skin: Negative for pallor and rash  Neurological: Positive for numbness (Of the bilateral hands especially at night)  Negative for dizziness, tremors, seizures, syncope, light-headedness and headaches  Hematological: Negative for adenopathy  Psychiatric/Behavioral: Negative for behavioral problems  Physical Exam   Constitutional: She is oriented to person, place, and time  She appears well-developed and well-nourished  No distress  HENT:   Head: Normocephalic and atraumatic  Eyes: No scleral icterus  Neurological: She is alert and oriented to person, place, and time  Skin: She is not diaphoretic  Psychiatric: She has a normal mood and affect  Her mood appears not anxious  She does not exhibit a depressed mood  I spent 10 minutes with the patient during this visit

## 2020-03-30 ENCOUNTER — TELEMEDICINE (OUTPATIENT)
Dept: INTERNAL MEDICINE CLINIC | Age: 75
End: 2020-03-30
Payer: MEDICARE

## 2020-03-30 DIAGNOSIS — K21.9 GASTROESOPHAGEAL REFLUX DISEASE WITHOUT ESOPHAGITIS: ICD-10-CM

## 2020-03-30 DIAGNOSIS — R73.09 ABNORMAL GLUCOSE: ICD-10-CM

## 2020-03-30 DIAGNOSIS — Z12.31 SCREENING MAMMOGRAM, ENCOUNTER FOR: ICD-10-CM

## 2020-03-30 DIAGNOSIS — Z96.652 STATUS POST TOTAL LEFT KNEE REPLACEMENT: ICD-10-CM

## 2020-03-30 DIAGNOSIS — G56.03 BILATERAL CARPAL TUNNEL SYNDROME: ICD-10-CM

## 2020-03-30 DIAGNOSIS — E78.00 HYPERCHOLESTEROLEMIA: ICD-10-CM

## 2020-03-30 DIAGNOSIS — F32.89 OTHER DEPRESSION: Primary | ICD-10-CM

## 2020-03-30 DIAGNOSIS — M17.12 ARTHRITIS OF LEFT KNEE: ICD-10-CM

## 2020-03-30 PROBLEM — M70.62 TROCHANTERIC BURSITIS, LEFT HIP: Status: RESOLVED | Noted: 2019-07-19 | Resolved: 2020-03-30

## 2020-03-30 PROBLEM — H61.22 EXCESSIVE CERUMEN IN LEFT EAR CANAL: Status: RESOLVED | Noted: 2020-03-16 | Resolved: 2020-03-30

## 2020-03-30 PROBLEM — H61.21 IMPACTED CERUMEN OF RIGHT EAR: Status: RESOLVED | Noted: 2019-01-11 | Resolved: 2020-03-30

## 2020-03-30 PROCEDURE — G2012 BRIEF CHECK IN BY MD/QHP: HCPCS | Performed by: FAMILY MEDICINE

## 2020-03-30 NOTE — PROGRESS NOTES
Virtual Brief Visit    Problem List Items Addressed This Visit        Digestive    Gastroesophageal reflux disease without esophagitis       Nervous and Auditory    Bilateral carpal tunnel syndrome       Other    Hypercholesterolemia    Relevant Orders    Lipid panel    Comprehensive metabolic panel    Depression - Primary    Status post total left knee replacement    Abnormal glucose    Relevant Orders    Hemoglobin A1C      Other Visit Diagnoses     Arthritis of left knee        Screening mammogram, encounter for        Relevant Orders    Mammo screening bilateral w cad            Mammogram [id number] Counseling: There is no height or weight on file to calculate BMI  The BMI is above normal  Nutrition recommendations include moderation in carbohydrate intake  Exercise recommendations include exercising 3-5 times per week  No pharmacotherapy was ordered  but I  Put an order in the chart when she is able to check mammogram due to COVID-19 restrictions  She has orders previously for blood work for this visit which she will get checked when she is able to  I put orders in her chart for blood work for the next 6 month visit  Her blood pressures at home are well controlled and she has lost some weight after total knee replacement due to being able to exercise now  We will schedule her next appointment for 6 months    visit is  regular f/up    Encounter provider Morelia Hall DO    Provider located at [facility] [facility] [address]      Recent Visits  Date Type Provider Dept   03/27/20 [address] [place] [address] recent visits within past 7 days and meeting all other requirements     Today's Visits  Date Type Provider Dept   03/30/20 Lupe Fernandez DO [facility]   Showing today's visits and meeting all other requirements     Future Appointments  No visits were found meeting these conditions  Showing future appointments within next 150 days and meeting all other requirements        After connecting through telephone, the patient was identified by name and date of birth  John Lawton was informed that this is a telemedicine visit and that the visit is being conducted through telephone  My office door was closed  No one else was in the room  She acknowledged consent and understanding of privacy and security of the video platform  The patient has agreed to participate and understands they can discontinue the visit at any time  Patient is aware this is a billable service  Subjective  John Lawton is a 69 y o  female hld         Hyperlipidemia (Follow-Up): The patient states her hyperlipidemia has been under good control since the last visit  She has no comorbid illnesses  Symptoms: denies chest pain,-denies intermittent leg claudication,-denies muscle pain-and-denies muscle weakness  Associated symptoms include no focal neurologic deficits-and-no memory loss  Medications: the patient is adherent with her medication regimen -the patient complains of medication side effects  The patient is doing well with her hyperlipidemia goals  the patient's LDL goal is <100 mg/dL  -the patient's last LDL was 72 mg/dL  -(5/2014)  February 2019, due for labs and has lab slip  August 2019, very well controlled  March 2020:  Not able to get labs done since they are closed right now however compliant with the medication and does not need refills     Gastroesophageal Reflux Disease (Follow-Up): The patient is being seen for follow-up of gastroesophageal reflux disease  Is essentially resolved symptoms  Not on anything on a regular basis  March 2020, well controlled    Depression (Follow-Up): The patient states her depression has been stable since the last visit  She has no comorbid illnesses     Interval Events: doing well but  recently dx with lung cancer after brain surgery and colonoscopy  She has had no significant interval events  Interval Symptoms: improved depression,-improved depressed mood,-improved loss of interest or pleasure in activities,-stable insomnia,-denies feelings of worthlessness,-denies feelings of guilt,-denies trouble concentrating,-improved anxiety-and-denies sexual dysfunction  February 2019,  passed away in the winter 2018 but she has a very large support system and is doing relatively well  Taking it day by day  Associated symptoms include: no recent weight gain-and-no thoughts of suicide  Social Support: the patient has good social support  Medications: the patient is adherent with her medication regimen -She denies medication side effects   Pt has been on zoloft >5 years  Kristy Vasquez she feels really stable on it and does not feel the need to go off of it August 2019, doing very well and does not require any medication changes  March 2020, doing very well with present dose Zoloft with no breakthrough symptoms  No suicidal or homicidal ideations    Status post total knee replacement, has been doing very well with rehab and exercising  Able to walk without difficulty and has lost 14 lb since her surgery in feels well  Carpal tunnel, was seen 3 weeks ago and prescribed gabapentin  She is currently taking 2 pills per day and symptoms have resolved completely  She is not experiencing any dizziness and feels this is working well  She had made an appointment with the hand surgeon however due to the Coronavirus restrictions that is currently canceled    She is not dropping things or losing  or fine motor skills        Past Medical History:   Diagnosis Date    Hyperlipidemia     Skin tag     last assessed 4/21/17, resolved 10/2/17        Past Surgical History:   Procedure Laterality Date    COLONOSCOPY      KNEE ARTHROSCOPY Left     KNEE ARTHROSCOPY W/ MENISCAL REPAIR Left 2000    KS TOTAL KNEE ARTHROPLASTY Left 10/24/2019    Procedure: ARTHROPLASTY KNEE TOTAL;  Surgeon: Jamie Trimble MD;  [facility]: [facility];  Service: Orthopedics    TONSILLECTOMY AND ADENOIDECTOMY         Current Outpatient Medications   Medication Sig Dispense Refill    ALPRAZolam (XANAX) 0 5 mg tablet Take 1 tablet (0 5 mg total) by mouth daily as needed (prn) 30 tablet 1    ascorbic acid (VITAMIN C) 500 mg tablet Take 500 mg by mouth daily      B Complex Vitamins (B COMPLEX PO) Take 1 capsule by mouth daily       CALCIUM PO Take 1 capsule by mouth daily       Cholecalciferol (VITAMIN D3 PO) Take 1 capsule by mouth daily       gabapentin (NEURONTIN) 100 mg capsule Take 3 capsules (300 mg total) by mouth daily at bedtime Pt may take one capsule at night for one week, then take 2 caps at night for one week and then 3 caps at night  90 capsule 1    sertraline (ZOLOFT) 100 mg tablet Take 1 tablet (100 mg total) by mouth daily at bedtime 90 tablet 1    simvastatin (ZOCOR) 20 mg tablet Take 1 tablet (20 mg total) by mouth daily at bedtime 90 tablet 1     No current facility-administered medications for this visit  Allergies   Allergen Reactions    Tetracyclines & Related      Blisters, yeast infection       Review of Systems      Constitutional:  Denies fever or chills , 14 lb weight loss per patient since last visit  Eyes:  Denies double or blurry vision  HENT:  Denies nasal congestion or sore throat   Respiratory:  Denies cough or shortness of breath or wheezing  Cardiovascular:  Denies palpitations or chest pain  GI:  Denies abdominal pain, nausea, or vomiting  No gerd  Integument:  Denies rash , no open areas  Neurologic:  Denies headache or focal weakness, no dizziness        I spent  25 minutes with the patient during this visit  - [id number]

## 2020-04-10 DIAGNOSIS — F41.9 ANXIETY: ICD-10-CM

## 2020-04-10 RX ORDER — ALPRAZOLAM 0.5 MG/1
0.5 TABLET ORAL DAILY PRN
Qty: 30 TABLET | Refills: 0 | Status: SHIPPED | OUTPATIENT
Start: 2020-04-10 | End: 2020-09-24 | Stop reason: SDUPTHER

## 2020-05-28 ENCOUNTER — OFFICE VISIT (OUTPATIENT)
Dept: INTERNAL MEDICINE CLINIC | Age: 75
End: 2020-05-28
Payer: MEDICARE

## 2020-05-28 VITALS
TEMPERATURE: 98.1 F | SYSTOLIC BLOOD PRESSURE: 110 MMHG | OXYGEN SATURATION: 96 % | BODY MASS INDEX: 27.39 KG/M2 | DIASTOLIC BLOOD PRESSURE: 58 MMHG | WEIGHT: 164.4 LBS | HEIGHT: 65 IN | HEART RATE: 88 BPM

## 2020-05-28 DIAGNOSIS — B02.9 HERPES ZOSTER WITHOUT COMPLICATION: ICD-10-CM

## 2020-05-28 DIAGNOSIS — Z12.11 SCREENING FOR MALIGNANT NEOPLASM OF COLON: Primary | ICD-10-CM

## 2020-05-28 DIAGNOSIS — G56.03 BILATERAL CARPAL TUNNEL SYNDROME: ICD-10-CM

## 2020-05-28 PROCEDURE — 1036F TOBACCO NON-USER: CPT | Performed by: FAMILY MEDICINE

## 2020-05-28 PROCEDURE — 1160F RVW MEDS BY RX/DR IN RCRD: CPT | Performed by: FAMILY MEDICINE

## 2020-05-28 PROCEDURE — 4040F PNEUMOC VAC/ADMIN/RCVD: CPT | Performed by: FAMILY MEDICINE

## 2020-05-28 PROCEDURE — 3008F BODY MASS INDEX DOCD: CPT | Performed by: FAMILY MEDICINE

## 2020-05-28 PROCEDURE — 99213 OFFICE O/P EST LOW 20 MIN: CPT | Performed by: FAMILY MEDICINE

## 2020-05-28 RX ORDER — GABAPENTIN 100 MG/1
300 CAPSULE ORAL
Qty: 90 CAPSULE | Refills: 1 | Status: SHIPPED | OUTPATIENT
Start: 2020-05-28 | End: 2020-09-01 | Stop reason: SDUPTHER

## 2020-06-11 ENCOUNTER — TELEPHONE (OUTPATIENT)
Dept: INTERNAL MEDICINE CLINIC | Facility: CLINIC | Age: 75
End: 2020-06-11

## 2020-09-01 DIAGNOSIS — F41.9 ANXIETY: ICD-10-CM

## 2020-09-01 DIAGNOSIS — E78.5 HYPERLIPIDEMIA, UNSPECIFIED HYPERLIPIDEMIA TYPE: ICD-10-CM

## 2020-09-01 DIAGNOSIS — G56.03 BILATERAL CARPAL TUNNEL SYNDROME: ICD-10-CM

## 2020-09-01 RX ORDER — GABAPENTIN 100 MG/1
300 CAPSULE ORAL
Qty: 90 CAPSULE | Refills: 1 | Status: SHIPPED | OUTPATIENT
Start: 2020-09-01 | End: 2020-09-24 | Stop reason: SDUPTHER

## 2020-09-01 RX ORDER — SIMVASTATIN 20 MG
20 TABLET ORAL
Qty: 90 TABLET | Refills: 1 | Status: SHIPPED | OUTPATIENT
Start: 2020-09-01 | End: 2021-03-01 | Stop reason: SDUPTHER

## 2020-09-01 RX ORDER — SERTRALINE HYDROCHLORIDE 100 MG/1
100 TABLET, FILM COATED ORAL
Qty: 90 TABLET | Refills: 1 | Status: SHIPPED | OUTPATIENT
Start: 2020-09-01 | End: 2021-03-01 | Stop reason: SDUPTHER

## 2020-09-21 ENCOUNTER — APPOINTMENT (OUTPATIENT)
Dept: LAB | Age: 75
End: 2020-09-21
Payer: MEDICARE

## 2020-09-21 DIAGNOSIS — E78.00 HYPERCHOLESTEROLEMIA: ICD-10-CM

## 2020-09-21 DIAGNOSIS — R73.09 ABNORMAL GLUCOSE: ICD-10-CM

## 2020-09-21 LAB
ALBUMIN SERPL BCP-MCNC: 4 G/DL (ref 3.5–5)
ALP SERPL-CCNC: 56 U/L (ref 46–116)
ALT SERPL W P-5'-P-CCNC: 26 U/L (ref 12–78)
ANION GAP SERPL CALCULATED.3IONS-SCNC: 4 MMOL/L (ref 4–13)
AST SERPL W P-5'-P-CCNC: 22 U/L (ref 5–45)
BILIRUB SERPL-MCNC: 0.46 MG/DL (ref 0.2–1)
BUN SERPL-MCNC: 15 MG/DL (ref 5–25)
CALCIUM SERPL-MCNC: 10 MG/DL (ref 8.3–10.1)
CHLORIDE SERPL-SCNC: 108 MMOL/L (ref 100–108)
CHOLEST SERPL-MCNC: 202 MG/DL (ref 50–200)
CO2 SERPL-SCNC: 29 MMOL/L (ref 21–32)
CREAT SERPL-MCNC: 0.68 MG/DL (ref 0.6–1.3)
EST. AVERAGE GLUCOSE BLD GHB EST-MCNC: 114 MG/DL
GFR SERPL CREATININE-BSD FRML MDRD: 87 ML/MIN/1.73SQ M
GLUCOSE P FAST SERPL-MCNC: 95 MG/DL (ref 65–99)
HBA1C MFR BLD: 5.6 %
HDLC SERPL-MCNC: 76 MG/DL
LDLC SERPL CALC-MCNC: 101 MG/DL (ref 0–100)
NONHDLC SERPL-MCNC: 126 MG/DL
POTASSIUM SERPL-SCNC: 4.3 MMOL/L (ref 3.5–5.3)
PROT SERPL-MCNC: 7.5 G/DL (ref 6.4–8.2)
SODIUM SERPL-SCNC: 141 MMOL/L (ref 136–145)
TRIGL SERPL-MCNC: 126 MG/DL

## 2020-09-21 PROCEDURE — 83036 HEMOGLOBIN GLYCOSYLATED A1C: CPT

## 2020-09-21 PROCEDURE — 36415 COLL VENOUS BLD VENIPUNCTURE: CPT

## 2020-09-21 PROCEDURE — 80053 COMPREHEN METABOLIC PANEL: CPT

## 2020-09-21 PROCEDURE — 80061 LIPID PANEL: CPT

## 2020-09-22 ENCOUNTER — HOSPITAL ENCOUNTER (OUTPATIENT)
Dept: RADIOLOGY | Age: 75
Discharge: HOME | End: 2020-09-22
Payer: MEDICARE

## 2020-09-22 VITALS — HEIGHT: 65 IN | BODY MASS INDEX: 27.32 KG/M2 | WEIGHT: 164 LBS

## 2020-09-22 DIAGNOSIS — Z12.31 SCREENING MAMMOGRAM, ENCOUNTER FOR: ICD-10-CM

## 2020-09-22 PROCEDURE — 77063 BREAST TOMOSYNTHESIS BI: CPT

## 2020-09-22 PROCEDURE — 77067 SCR MAMMO BI INCL CAD: CPT

## 2020-09-24 ENCOUNTER — OFFICE VISIT (OUTPATIENT)
Dept: INTERNAL MEDICINE CLINIC | Age: 75
End: 2020-09-24
Payer: MEDICARE

## 2020-09-24 VITALS
SYSTOLIC BLOOD PRESSURE: 102 MMHG | BODY MASS INDEX: 28.06 KG/M2 | WEIGHT: 168.4 LBS | TEMPERATURE: 98.1 F | HEART RATE: 88 BPM | OXYGEN SATURATION: 98 % | DIASTOLIC BLOOD PRESSURE: 68 MMHG | HEIGHT: 65 IN

## 2020-09-24 DIAGNOSIS — G56.03 BILATERAL CARPAL TUNNEL SYNDROME: ICD-10-CM

## 2020-09-24 DIAGNOSIS — E78.00 HYPERCHOLESTEROLEMIA: ICD-10-CM

## 2020-09-24 DIAGNOSIS — Z23 NEED FOR IMMUNIZATION AGAINST INFLUENZA: ICD-10-CM

## 2020-09-24 DIAGNOSIS — K21.9 GASTROESOPHAGEAL REFLUX DISEASE WITHOUT ESOPHAGITIS: ICD-10-CM

## 2020-09-24 DIAGNOSIS — M25.552 HIP PAIN, ACUTE, LEFT: ICD-10-CM

## 2020-09-24 DIAGNOSIS — M25.552 HIP PAIN, LEFT: ICD-10-CM

## 2020-09-24 DIAGNOSIS — F32.89 OTHER DEPRESSION: ICD-10-CM

## 2020-09-24 DIAGNOSIS — Z12.11 SPECIAL SCREENING FOR MALIGNANT NEOPLASM OF COLON: Primary | ICD-10-CM

## 2020-09-24 DIAGNOSIS — M85.89 OSTEOPENIA OF MULTIPLE SITES: ICD-10-CM

## 2020-09-24 DIAGNOSIS — M94.9 DISORDER OF CARTILAGE, UNSPECIFIED: ICD-10-CM

## 2020-09-24 DIAGNOSIS — F41.9 ANXIETY: ICD-10-CM

## 2020-09-24 DIAGNOSIS — Z00.00 MEDICARE ANNUAL WELLNESS VISIT, SUBSEQUENT: ICD-10-CM

## 2020-09-24 PROBLEM — B02.9 HERPES ZOSTER WITHOUT COMPLICATION: Status: RESOLVED | Noted: 2020-05-28 | Resolved: 2020-09-24

## 2020-09-24 PROBLEM — R73.09 ABNORMAL GLUCOSE: Status: RESOLVED | Noted: 2020-03-30 | Resolved: 2020-09-24

## 2020-09-24 PROCEDURE — 90662 IIV NO PRSV INCREASED AG IM: CPT

## 2020-09-24 PROCEDURE — G0008 ADMIN INFLUENZA VIRUS VAC: HCPCS

## 2020-09-24 PROCEDURE — G0439 PPPS, SUBSEQ VISIT: HCPCS | Performed by: FAMILY MEDICINE

## 2020-09-24 PROCEDURE — 99214 OFFICE O/P EST MOD 30 MIN: CPT | Performed by: FAMILY MEDICINE

## 2020-09-24 RX ORDER — GABAPENTIN 300 MG/1
300 CAPSULE ORAL
Qty: 90 CAPSULE | Refills: 1 | Status: SHIPPED | OUTPATIENT
Start: 2020-09-24 | End: 2021-04-05 | Stop reason: SDUPTHER

## 2020-09-24 RX ORDER — ALPRAZOLAM 0.5 MG/1
0.5 TABLET ORAL DAILY PRN
Qty: 30 TABLET | Refills: 1 | Status: SHIPPED | OUTPATIENT
Start: 2020-09-24 | End: 2021-06-03 | Stop reason: SDUPTHER

## 2020-09-24 RX ORDER — GABAPENTIN 100 MG/1
300 CAPSULE ORAL
Qty: 90 CAPSULE | Refills: 1 | Status: SHIPPED | OUTPATIENT
Start: 2020-09-24 | End: 2020-09-24 | Stop reason: SDUPTHER

## 2020-09-24 NOTE — PROGRESS NOTES
Assessment and Plan:     Problem List Items Addressed This Visit        Nervous and Auditory    Bilateral carpal tunnel syndrome       Other    Medicare annual wellness visit, subsequent      Other Visit Diagnoses     Special screening for malignant neoplasm of colon    -  Primary    Anxiety               Preventive health issues were discussed with patient, and age appropriate screening tests were ordered as noted in patient's After Visit Summary  Personalized health advice and appropriate referrals for health education or preventive services given if needed, as noted in patient's After Visit Summary       History of Present Illness:     Patient presents for Medicare Annual Wellness visit    Patient Care Team:  Marvel Ashley DO as PCP - General     Problem List:     Patient Active Problem List   Diagnosis    Hypercholesterolemia    Gastroesophageal reflux disease without esophagitis    Depression    Rosacea    History of arthroscopy of left knee    Status post total left knee replacement    Bilateral carpal tunnel syndrome    Abnormal glucose    Herpes zoster without complication    Medicare annual wellness visit, subsequent      Past Medical and Surgical History:     Past Medical History:   Diagnosis Date    Hyperlipidemia     Skin tag     last assessed 4/21/17, resolved 10/2/17      Past Surgical History:   Procedure Laterality Date    COLONOSCOPY      KNEE ARTHROSCOPY Left     KNEE ARTHROSCOPY W/ MENISCAL REPAIR Left 2000    MI TOTAL KNEE ARTHROPLASTY Left 10/24/2019    Procedure: ARTHROPLASTY KNEE TOTAL;  Surgeon: Tyra Pulliam MD;  [facility];  Service: Orthopedics    TONSILLECTOMY AND ADENOIDECTOMY        Family History:     Family History   Problem Relation Age of Onset    Stroke Mother         CVA    Hyperlipidemia Mother     Stroke Maternal Grandfather         CVA    Stroke Family         CVA    Other Family         breast disorders    Prostate cancer Father 76    Other Maternal Aunt         breast disorders     No Known Problems Sister     No Known Problems Daughter     No Known Problems Maternal Grandmother     No Known Problems Paternal Grandmother     No Known Problems Paternal Grandfather     No Known Problems Daughter     No Known Problems Son     Breast cancer Maternal Aunt 61    No Known Problems Maternal Aunt     No Known Problems Maternal Aunt     No Known Problems Maternal Aunt     No Known Problems Maternal Aunt     No Known Problems Paternal Aunt     No Known Problems Paternal Aunt       Social History:        Social History     Socioeconomic History    Marital status:       Spouse name: None    Number of children: 3    Years of education: None    Highest education level: None   Occupational History    Occupation: Caterer   Social Needs    Financial resource strain: None    Food insecurity     Worry: None     Inability: None    Transportation needs     Medical: None     Non-medical: None   Tobacco Use    Smoking status: Former Smoker     Types: Cigarettes     Last attempt to quit: 1980     Years since quittin 7    Smokeless tobacco: Never Used   Substance and Sexual Activity    Alcohol use: Yes     Frequency: Monthly or less     Binge frequency: Less than monthly     Comment: socially    Drug use: No    Sexual activity: None   Lifestyle    Physical activity     Days per week: None     Minutes per session: None    Stress: None   Relationships    Social connections     Talks on phone: None     Gets together: None     Attends Congregation service: None     Active member of club or organization: None     Attends meetings of clubs or organizations: None     Relationship status: None    Intimate partner violence     Fear of current or ex partner: None     Emotionally abused: None     Physically abused: None     Forced sexual activity: None   Other Topics Concern    None   Social History Narrative    Caffeine use, admits to consuming soda    Daily coffee consumption (4 cups/day)     Exercising regularly       Medications and Allergies:     Current Outpatient Medications   Medication Sig Dispense Refill    ALPRAZolam (XANAX) 0 5 mg tablet Take 1 tablet (0 5 mg total) by mouth daily as needed (prn) 30 tablet 0    B Complex Vitamins (B COMPLEX PO) Take 1 capsule by mouth daily       CALCIUM PO Take 1 capsule by mouth daily       Cholecalciferol (VITAMIN D3 PO) Take 1 capsule by mouth daily       gabapentin (NEURONTIN) 100 mg capsule Take 3 capsules (300 mg total) by mouth daily at bedtime 90 capsule 1    sertraline (ZOLOFT) 100 mg tablet Take 1 tablet (100 mg total) by mouth daily at bedtime 90 tablet 1    simvastatin (ZOCOR) 20 mg tablet Take 1 tablet (20 mg total) by mouth daily at bedtime 90 tablet 1    ascorbic acid (VITAMIN C) 500 mg tablet Take 500 mg by mouth daily       No current facility-administered medications for this visit  Allergies   Allergen Reactions    Tetracyclines & Related      Blisters, yeast infection      Immunizations:     Immunization History   Administered Date(s) Administered    INFLUENZA 11/01/2018, 10/14/2019    Influenza Split High Dose Preservative Free IM 10/05/2015, 12/21/2016, 10/20/2017, 10/14/2019    Pneumococcal Conjugate 13-Valent 04/21/2017    Pneumococcal Polysaccharide PPV23 02/07/2019      Health Maintenance:         Topic Date Due    Cervical Cancer Screening  07/31/1966    MAMMOGRAM  09/22/2021    Hepatitis C Screening  Completed         Topic Date Due    DTaP,Tdap,and Td Vaccines (1 - Tdap) [birth year]31/1966    Influenza Vaccine  07/01/2020      Medicare Health Risk Assessment:     /68 (BP Location: Left arm, Patient Position: Sitting, Cuff Size: Standard)   Pulse 88   Temp 98 1 °F (36 7 °C) (Temporal)   Ht 5' 4 5" (1 638 m)   Wt 76 4 kg (168 lb 6 4 oz)   SpO2 98%   BMI 28 46 kg/m²      Karon Baer is here for her Subsequent Wellness visit   Last Medicare Wellness visit information reviewed, patient interviewed and updates made to the record today  Health Risk Assessment:   Patient rates overall health as good  Patient feels that their physical health rating is same  Eyesight was rated as same  Hearing was rated as same  Patient feels that their emotional and mental health rating is same  Pain experienced in the last 7 days has been some  Patient's pain rating has been 5/10  Patient states that she has experienced no weight loss or gain in last 6 months  Fall Risk Screening: In the past year, patient has experienced: history of falling in past year    Number of falls: 2 or more  Injured during fall?: Yes    Feels unsteady when standing or walking?: Yes    Worried about falling?: Yes      Urinary Incontinence Screening:   Patient has not leaked urine accidently in the last six months  Home Safety:  Patient does not have trouble with stairs inside or outside of their home  Patient has working smoke alarms and has no working carbon monoxide detector  Home safety hazards include: none  Nutrition:   Current diet is Limited junk food  Medications:   Patient is currently taking over-the-counter supplements  OTC medications include: see medication list  Patient is able to manage medications  Activities of Daily Living (ADLs)/Instrumental Activities of Daily Living (IADLs):   Walk and transfer into and out of bed and chair?: Yes  Dress and groom yourself?: Yes    Bathe or shower yourself?: Yes    Feed yourself?  Yes  Do your laundry/housekeeping?: Yes  Manage your money, pay your bills and track your expenses?: Yes  Make your own meals?: Yes    Do your own shopping?: Yes    Previous Hospitalizations:   Any hospitalizations or ED visits within the last 12 months?: No    How many hospitalizations have you had in the last year?: 1-2    Advance Care Planning:   Living will: Yes    Advanced directive: Yes      Comments: Her daughter Moi Dunn    Cognitive Screening: Provider or family/friend/caregiver concerned regarding cognition?: No    PREVENTIVE SCREENINGS      Cardiovascular Screening:    General: Screening Not Indicated and History Lipid Disorder      Diabetes Screening:     General: Screening Current      Colorectal Cancer Screening:     General: Screening Current      Breast Cancer Screening:     General: Screening Current      Cervical Cancer Screening:    General: Screening Not Indicated      Abdominal Aortic Aneurysm (AAA) Screening:        General: Screening Not Indicated      Lung Cancer Screening:     General: Screening Not Indicated      Hepatitis C Screening:    General: Screening Current    Other Counseling Topics:   Alcohol use counseling, car/seat belt/driving safety, skin self-exam, sunscreen and regular weightbearing exercise and calcium and vitamin D intake         Tana Egan, DO

## 2020-09-24 NOTE — PROGRESS NOTES
Assessment/Plan:    1  Special screening for malignant neoplasm of colon  -     Cologuard; Future    2  Bilateral carpal tunnel syndrome  -     gabapentin (NEURONTIN) 300 mg capsule; Take 1 capsule (300 mg total) by mouth daily at bedtime    3  Anxiety  -     ALPRAZolam (XANAX) 0 5 mg tablet; Take 1 tablet (0 5 mg total) by mouth daily as needed (prn)    4  Medicare annual wellness visit, subsequent    5  Hypercholesterolemia  -     Lipid panel; Future  -     Comprehensive metabolic panel; Future; Expected date: 03/24/2021  -     CK; Future    6  Gastroesophageal reflux disease without esophagitis    7  Other depression    8  Hip pain, acute, left  -     XR hip/pelv 2-3 vws left if performed; Future; Expected date: 09/24/2020    9  Hip pain, left    10  Osteopenia of multiple sites  -     DXA bone density spine hip and pelvis; Future; Expected date: 09/24/2020  -     Vitamin D 25 hydroxy; Future    11  Disorder of cartilage, unspecified   -     Vitamin D 25 hydroxy; Future    12  Need for immunization against influenza  -     influenza vaccine, high-dose, PF 0 7 mL (FLUZONE HIGH-DOSE)      BMI Counseling: Body mass index is 28 46 kg/m²  The BMI is above normal  Nutrition recommendations include moderation in carbohydrate intake  Exercise recommendations include exercising 3-5 times per week  No pharmacotherapy was ordered  Falls Plan of Care: balance, strength, and gait training instructions were provided          Lab Results   Component Value Date    WBC 14 81 (H) 10/25/2019    HGB 11 4 (L) 10/25/2019    HCT 35 1 10/25/2019     10/25/2019    CHOL 167 05/04/2015    TRIG 126 09/21/2020    HDL 76 09/21/2020    ALT 26 09/21/2020    AST 22 09/21/2020     05/04/2015    K 4 3 09/21/2020     09/21/2020    CREATININE 0 68 09/21/2020    BUN 15 09/21/2020    CO2 29 09/21/2020    INR 0 98 10/09/2019    GLUF 95 09/21/2020    HGBA1C 5 6 09/21/2020       Patient Instructions       Medicare Preventive Visit Patient Instructions  Thank you for completing your Welcome to Medicare Visit or Medicare Annual Wellness Visit today  Your next wellness visit will be due in one year (9/24/2021)  The screening/preventive services that you may require over the next 5-10 years are detailed below  Some tests may not apply to you based off risk factors and/or age  Screening tests ordered at today's visit but not completed yet may show as past due  Also, please note that scanned in results may not display below  Preventive Screenings:  Service Recommendations Previous Testing/Comments   Colorectal Cancer Screening  * Colonoscopy    * Fecal Occult Blood Test (FOBT)/Fecal Immunochemical Test (FIT)  * Fecal DNA/Cologuard Test  * Flexible Sigmoidoscopy Age: 54-65 years old   Colonoscopy: every 10 years (may be performed more frequently if at higher risk)  OR  FOBT/FIT: every 1 year  OR  Cologuard: every 3 years  OR  Sigmoidoscopy: every 5 years  Screening may be recommended earlier than age 48 if at higher risk for colorectal cancer  Also, an individualized decision between you and your healthcare provider will decide whether screening between the ages of 74-80 would be appropriate  Colonoscopy: 07/18/2013  FOBT/FIT: Not on file  Cologuard: Not on file  Sigmoidoscopy: Not on file    Screening Current     Breast Cancer Screening Age: 36 years old  Frequency: every 1-2 years  Not required if history of left and right mastectomy Mammogram: 09/22/2020    Screening Current   Cervical Cancer Screening Between the ages of 21-29, pap smear recommended once every 3 years  Between the ages of 33-67, can perform pap smear with HPV co-testing every 5 years     Recommendations may differ for women with a history of total hysterectomy, cervical cancer, or abnormal pap smears in past  Pap Smear: Not on file    Screening Not Indicated   Hepatitis C Screening Once for adults born between [facility]  More frequently in patients at high risk for Hepatitis C Hep C Antibody: 02/08/2019    Screening Current   Diabetes Screening 1-2 times per year if you're at risk for diabetes or have pre-diabetes Fasting glucose: 95 mg/dL   A1C: 5 6 %    Screening Current   Cholesterol Screening Once every 5 years if you don't have a lipid disorder  May order more often based on risk factors  Lipid panel: 09/21/2020    Screening Not Indicated  History Lipid Disorder     Other Preventive Screenings Covered by Medicare:  1  Abdominal Aortic Aneurysm (AAA) Screening: covered once if your at risk  You're considered to be at risk if you have a family history of AAA  2  Lung Cancer Screening: covers low dose CT scan once per year if you meet all of the following conditions: (1) Age 50-69; (2) No signs or symptoms of lung cancer; (3) Current smoker or have quit smoking within the last 15 years; (4) You have a tobacco smoking history of at least 30 pack years (packs per day multiplied by number of years you smoked); (5) You get a written order from a healthcare provider  3  Glaucoma Screening: covered annually if you're considered high risk: (1) You have diabetes OR (2) Family history of glaucoma OR (3)  aged 48 and older OR (3)  American aged 72 and older  3  Osteoporosis Screening: covered every 2 years if you meet one of the following conditions: (1) You're estrogen deficient and at risk for osteoporosis based off medical history and other findings; (2) Have a vertebral abnormality; (3) On glucocorticoid therapy for more than 3 months; (4) Have primary hyperparathyroidism; (5) On osteoporosis medications and need to assess response to drug therapy  · Last bone density test (DXA Scan): 03/13/2019   5  HIV Screening: covered annually if you're between the age of 15-65  Also covered annually if you are younger than 13 and older than 72 with risk factors for HIV infection   For pregnant patients, it is covered up to 3 times per pregnancy  Immunizations:  Immunization Recommendations   Influenza Vaccine Annual influenza vaccination during flu season is recommended for all persons aged >= 6 months who do not have contraindications   Pneumococcal Vaccine (Prevnar and Pneumovax)  * Prevnar = PCV13  * Pneumovax = PPSV23   Adults 25-60 years old: 1-3 doses may be recommended based on certain risk factors  Adults 72 years old: Prevnar (PCV13) vaccine recommended followed by Pneumovax (PPSV23) vaccine  If already received PPSV23 since turning 65, then PCV13 recommended at least one year after PPSV23 dose  Hepatitis B Vaccine 3 dose series if at intermediate or high risk (ex: diabetes, end stage renal disease, liver disease)   Tetanus (Td) Vaccine - COST NOT COVERED BY MEDICARE PART B Following completion of primary series, a booster dose should be given every 10 years to maintain immunity against tetanus  Td may also be given as tetanus wound prophylaxis  Tdap Vaccine - COST NOT COVERED BY MEDICARE PART B Recommended at least once for all adults  For pregnant patients, recommended with each pregnancy  Shingles Vaccine (Shingrix) - COST NOT COVERED BY MEDICARE PART B  2 shot series recommended in those aged 48 and above     Health Maintenance Due:      Topic Date Due    Cervical Cancer Screening  07/31/1966    MAMMOGRAM  09/22/2021    Hepatitis C Screening  Completed     Immunizations Due:      Topic Date Due    DTaP,Tdap,and Td Vaccines (1 - Tdap) 07/31/1966    Influenza Vaccine  07/01/2020     Advance Directives   What are advance directives? Advance directives are legal documents that state your wishes and plans for medical care  These plans are made ahead of time in case you lose your ability to make decisions for yourself  Advance directives can apply to any medical decision, such as the treatments you want, and if you want to donate organs  What are the types of advance directives?   There are many types of advance directives, and each state has rules about how to use them  You may choose a combination of any of the following:  · Living will: This is a written record of the treatment you want  You can also choose which treatments you do not want, which to limit, and which to stop at a certain time  This includes surgery, medicine, IV fluid, and tube feedings  · Durable power of  for healthcare [facility]): This is a written record that states who you want to make healthcare choices for you when you are unable to make them for yourself  This person, called a proxy, is usually a family member or a friend  You may choose more than 1 proxy  · Do not resuscitate (DNR) order:  A DNR order is used in case your heart stops beating or you stop breathing  It is a request not to have certain forms of treatment, such as CPR  A DNR order may be included in other types of advance directives  · Medical directive: This covers the care that you want if you are in a coma, near death, or unable to make decisions for yourself  You can list the treatments you want for each condition  Treatment may include pain medicine, surgery, blood transfusions, dialysis, IV or tube feedings, and a ventilator (breathing machine)  · Values history: This document has questions about your views, beliefs, and how you feel and think about life  This information can help others choose the care that you would choose  Why are advance directives important? An advance directive helps you control your care  Although spoken wishes may be used, it is better to have your wishes written down  Spoken wishes can be misunderstood, or not followed  Treatments may be given even if you do not want them  An advance directive may make it easier for your family to make difficult choices about your care     Weight Management   Why it is important to manage your weight:  Being overweight increases your risk of health conditions such as heart disease, high blood pressure, type 2 diabetes, and certain types of cancer  It can also increase your risk for osteoarthritis, sleep apnea, and other respiratory problems  Aim for a slow, steady weight loss  Even a small amount of weight loss can lower your risk of health problems  How to lose weight safely:  A safe and healthy way to lose weight is to eat fewer calories and get regular exercise  You can lose up about 1 pound a week by decreasing the number of calories you eat by 500 calories each day  Healthy meal plan for weight management:  A healthy meal plan includes a variety of foods, contains fewer calories, and helps you stay healthy  A healthy meal plan includes the following:  · Eat whole-grain foods more often  A healthy meal plan should contain fiber  Fiber is the part of grains, fruits, and vegetables that is not broken down by your body  Whole-grain foods are healthy and provide extra fiber in your diet  Some examples of whole-grain foods are whole-wheat breads and pastas, oatmeal, brown rice, and bulgur  · Eat a variety of vegetables every day  Include dark, leafy greens such as spinach, kale, simeon greens, and mustard greens  Eat yellow and orange vegetables such as carrots, sweet potatoes, and winter squash  · Eat a variety of fruits every day  Choose fresh or canned fruit (canned in its own juice or light syrup) instead of juice  Fruit juice has very little or no fiber  · Eat low-fat dairy foods  Drink fat-free (skim) milk or 1% milk  Eat fat-free yogurt and low-fat cottage cheese  Try low-fat cheeses such as mozzarella and other reduced-fat cheeses  · Choose meat and other protein foods that are low in fat  Choose beans or other legumes such as split peas or lentils  Choose fish, skinless poultry (chicken or turkey), or lean cuts of red meat (beef or pork)  Before you cook meat or poultry, cut off any visible fat  · Use less fat and oil  Try baking foods instead of frying them   Add less fat, such as margarine, sour cream, regular salad dressing and mayonnaise to foods  Eat fewer high-fat foods  Some examples of high-fat foods include french fries, doughnuts, ice cream, and cakes  · Eat fewer sweets  Limit foods and drinks that are high in sugar  This includes candy, cookies, regular soda, and sweetened drinks  Exercise:  Exercise at least 30 minutes per day on most days of the week  Some examples of exercise include walking, biking, dancing, and swimming  You can also fit in more physical activity by taking the stairs instead of the elevator or parking farther away from stores  Ask your healthcare provider about the best exercise plan for you  © Copyright [employer] 2018 Information is for End User's use only and may not be sold, redistributed or otherwise used for commercial purposes  All illustrations and images included in CareNotes® are the copyrighted property of A D [employer] M [employer]  or [address]        Return in about 6 months (around 3/24/2021) for Recheck  Subjective:      Patient ID: Masood Jarrett is a 70 y o  female  Chief Complaint   Patient presents with    Follow-up     hypercholesterolemia  Review labs     Medicare Wellness Visit     SUB AWV       HPI      Hyperlipidemia (Follow-Up): The patient states her hyperlipidemia has been under good control since the last visit  She has no comorbid illnesses  Symptoms: denies chest pain,-denies intermittent leg claudication,-denies muscle pain-and-denies muscle weakness  Associated symptoms include no focal neurologic deficits-and-no memory loss  Medications: the patient is adherent with her medication regimen -the patient complains of medication side effects  The patient is doing well with her hyperlipidemia goals  the patient's LDL goal is <100 mg/dL  -the patient's last LDL was 72 mg/dL  -(5/2014)   February 2019, due for labs and has lab slip  August 2019, very well controlled  March 2020:  Not able to get labs done since they are closed right now however compliant with the medication and does not need refills  September 2020, compliant with medications and doing well  No side effects lipid levels well controlled     Gastroesophageal Reflux Disease (Follow-Up): The patient is being seen for follow-up of gastroesophageal reflux disease   Is essentially resolved symptoms   Not on anything on a regular basis  March 2020, well controlled  September 2020, well controlled     Depression (Follow-Up): The patient states her depression has been stable since the last visit  She has no comorbid illnesses  Interval Events: doing well but  recently dx with lung cancer after brain surgery and colonoscopy  She has had no significant interval events  Interval Symptoms: improved depression,-improved depressed mood,-improved loss of interest or pleasure in activities,-stable insomnia,-denies feelings of worthlessness,-denies feelings of guilt,-denies trouble concentrating,-improved anxiety-and-denies sexual dysfunction   February 2019,  passed away in the winter 2018 but she has a very large support system and is doing relatively well   Taking it day by day  Associated symptoms include: no recent weight gain-and-no thoughts of suicide  Social Support: the patient has good social support  Medications: the patient is adherent with her medication regimen -She denies medication side effects   Pt has been on zoloft >5 years  [facility] she feels really stable on it and does not feel the need to go off of it August 2019, doing very well and does not require any medication changes  March 2020, doing very well with present dose Zoloft with no breakthrough symptoms  No suicidal or homicidal ideations  September 2020, well controlled and takes at night  No suicidal or homicidal ideations  Has been 2 years yesterday since her  passed     Status post total knee replacement, has been doing very well with rehab and exercising    Able to walk without difficulty and has lost 14 lb since her surgery in feels well      Carpal tunnel, was seen 3 weeks ago and prescribed gabapentin  She is currently taking 2 pills per day and symptoms have resolved completely  She is not experiencing any dizziness and feels this is working well  She had made an appointment with the hand surgeon however due to the Coronavirus restrictions that is currently canceled  She is not dropping things or losing  or fine motor skills  September 2020, taking 300 mg gabapentin at bedtime  She is not quite sure but thinks it may have been due to shingles  She had had the numbness and paresthesias in a few weeks later her shingles rash erupted  She would like to trial off to see if her symptoms return    Fall, patient slipped on water on the floor twice and the 1st time was 1 month ago and fell on her right shoulder and now has full range of motion but some discomfort on the lower part of her biceps  The 2nd time she fell after her daughter had just mops the floor and it was still wet and hit her left hip  She has pain while walking  She has been swimming all summer and has no difficulty doing that and has no problems with her activities of daily living    She is taking by reason oral ibuprofen to help with the discomfort           The following portions of the patient's history were reviewed and updated as appropriate: allergies, current medications, past family history, past medical history, past social history, past surgical history and problem list     Review of Systems      Constitutional:  Denies fever or chills no significant weight issues  Eyes:  Denies double or blurry vision  HENT:  Denies nasal congestion or sore throat no allergy symptoms  Respiratory:  Denies cough or shortness of breath or wheezing  Cardiovascular:  Denies palpitations or chest pain  GI:  Denies abdominal pain, nausea, or vomiting  Integument:  Denies rash , no open areas  Neurologic: Denies headache or focal weakness no no dizziness        Current Outpatient Medications   Medication Sig Dispense Refill    ALPRAZolam (XANAX) 0 5 mg tablet Take 1 tablet (0 5 mg total) by mouth daily as needed (prn) 30 tablet 1    B Complex Vitamins (B COMPLEX PO) Take 1 capsule by mouth daily       CALCIUM PO Take 1 capsule by mouth daily       Cholecalciferol (VITAMIN D3 PO) Take 1 capsule by mouth daily       gabapentin (NEURONTIN) 300 mg capsule Take 1 capsule (300 mg total) by mouth daily at bedtime 90 capsule 1    sertraline (ZOLOFT) 100 mg tablet Take 1 tablet (100 mg total) by mouth daily at bedtime 90 tablet 1    simvastatin (ZOCOR) 20 mg tablet Take 1 tablet (20 mg total) by mouth daily at bedtime 90 tablet 1    ascorbic acid (VITAMIN C) 500 mg tablet Take 500 mg by mouth daily       No current facility-administered medications for this visit  Objective:    /68 (BP Location: Left arm, Patient Position: Sitting, Cuff Size: Standard)   Pulse 88   Temp 98 1 °F (36 7 °C) (Temporal)   Ht 5' 4 5" (1 638 m)   Wt 76 4 kg (168 lb 6 4 oz)   SpO2 98%   BMI 28 46 kg/m²        Physical Exam       Constitutional:  Well developed, well nourished, no acute distress, non-toxic appearance   Eyes:  PERRL, conjunctiva normal , non icteric sclera  HENT:  Atraumatic, oropharynx moist  Neck-  supple   Respiratory:  CTA b/l, normal breath sounds, no rales, no wheezing   Cardiovascular:  RRR, no murmurs, no LE edema b/l  GI:  Soft, nondistended, normal bowel sounds x 4, nontender, no organomegaly, no mass, no rebound, no guarding   Neurologic:  no focal deficits noted   Psychiatric:  Speech and behavior appropriate , AAO x 3  Musculoskeletal, slight limp  Tender to palpation posterior biceps muscle on right arm  No bony pain to palpation full range of motion  Hypertonicity noted in that area  Left hip region with slight tenderness to palpation over greater trochanter        Miner Locks, DO

## 2020-09-24 NOTE — PATIENT INSTRUCTIONS
Medicare Preventive Visit Patient Instructions  Thank you for completing your Welcome to Medicare Visit or Medicare Annual Wellness Visit today  Your next wellness visit will be due in one year (9/24/2021)  The screening/preventive services that you may require over the next 5-10 years are detailed below  Some tests may not apply to you based off risk factors and/or age  Screening tests ordered at today's visit but not completed yet may show as past due  Also, please note that scanned in results may not display below  Preventive Screenings:  Service Recommendations Previous Testing/Comments   Colorectal Cancer Screening  * Colonoscopy    * Fecal Occult Blood Test (FOBT)/Fecal Immunochemical Test (FIT)  * Fecal DNA/Cologuard Test  * Flexible Sigmoidoscopy Age: 54-65 years old   Colonoscopy: every 10 years (may be performed more frequently if at higher risk)  OR  FOBT/FIT: every 1 year  OR  Cologuard: every 3 years  OR  Sigmoidoscopy: every 5 years  Screening may be recommended earlier than age 48 if at higher risk for colorectal cancer  Also, an individualized decision between you and your healthcare provider will decide whether screening between the ages of 74-80 would be appropriate  Colonoscopy: 07/18/2013  FOBT/FIT: Not on file  Cologuard: Not on file  Sigmoidoscopy: Not on file    Screening Current     Breast Cancer Screening Age: 36 years old  Frequency: every 1-2 years  Not required if history of left and right mastectomy Mammogram: 09/22/2020    Screening Current   Cervical Cancer Screening Between the ages of 21-29, pap smear recommended once every 3 years  Between the ages of 33-67, can perform pap smear with HPV co-testing every 5 years     Recommendations may differ for women with a history of total hysterectomy, cervical cancer, or abnormal pap smears in past  Pap Smear: Not on file    Screening Not Indicated   Hepatitis C Screening Once for adults born between 1945 and 1965  More frequently in patients at high risk for Hepatitis C Hep C Antibody: 02/08/2019    Screening Current   Diabetes Screening 1-2 times per year if you're at risk for diabetes or have pre-diabetes Fasting glucose: 95 mg/dL   A1C: 5 6 %    Screening Current   Cholesterol Screening Once every 5 years if you don't have a lipid disorder  May order more often based on risk factors  Lipid panel: 09/21/2020    Screening Not Indicated  History Lipid Disorder     Other Preventive Screenings Covered by Medicare:  1  Abdominal Aortic Aneurysm (AAA) Screening: covered once if your at risk  You're considered to be at risk if you have a family history of AAA  2  Lung Cancer Screening: covers low dose CT scan once per year if you meet all of the following conditions: (1) Age 50-69; (2) No signs or symptoms of lung cancer; (3) Current smoker or have quit smoking within the last 15 years; (4) You have a tobacco smoking history of at least 30 pack years (packs per day multiplied by number of years you smoked); (5) You get a written order from a healthcare provider  3  Glaucoma Screening: covered annually if you're considered high risk: (1) You have diabetes OR (2) Family history of glaucoma OR (3)  aged 48 and older OR (3)  American aged 72 and older  3  Osteoporosis Screening: covered every 2 years if you meet one of the following conditions: (1) You're estrogen deficient and at risk for osteoporosis based off medical history and other findings; (2) Have a vertebral abnormality; (3) On glucocorticoid therapy for more than 3 months; (4) Have primary hyperparathyroidism; (5) On osteoporosis medications and need to assess response to drug therapy  · Last bone density test (DXA Scan): 03/13/2019   5  HIV Screening: covered annually if you're between the age of 15-65  Also covered annually if you are younger than 13 and older than 72 with risk factors for HIV infection   For pregnant patients, it is covered up to 3 times per pregnancy  Immunizations:  Immunization Recommendations   Influenza Vaccine Annual influenza vaccination during flu season is recommended for all persons aged >= 6 months who do not have contraindications   Pneumococcal Vaccine (Prevnar and Pneumovax)  * Prevnar = PCV13  * Pneumovax = PPSV23   Adults 25-60 years old: 1-3 doses may be recommended based on certain risk factors  Adults 72 years old: Prevnar (PCV13) vaccine recommended followed by Pneumovax (PPSV23) vaccine  If already received PPSV23 since turning 65, then PCV13 recommended at least one year after PPSV23 dose  Hepatitis B Vaccine 3 dose series if at intermediate or high risk (ex: diabetes, end stage renal disease, liver disease)   Tetanus (Td) Vaccine - COST NOT COVERED BY MEDICARE PART B Following completion of primary series, a booster dose should be given every 10 years to maintain immunity against tetanus  Td may also be given as tetanus wound prophylaxis  Tdap Vaccine - COST NOT COVERED BY MEDICARE PART B Recommended at least once for all adults  For pregnant patients, recommended with each pregnancy  Shingles Vaccine (Shingrix) - COST NOT COVERED BY MEDICARE PART B  2 shot series recommended in those aged 48 and above     Health Maintenance Due:      Topic Date Due    Cervical Cancer Screening  07/31/1966    MAMMOGRAM  09/22/2021    Hepatitis C Screening  Completed     Immunizations Due:      Topic Date Due    DTaP,Tdap,and Td Vaccines (1 - Tdap) 07[birth year]31/1968    Influenza Vaccine  07/01/2020     Advance Directives   What are advance directives? Advance directives are legal documents that state your wishes and plans for medical care  These plans are made ahead of time in case you lose your ability to make decisions for yourself  Advance directives can apply to any medical decision, such as the treatments you want, and if you want to donate organs  What are the types of advance directives?   There are many types of advance directives, and each state has rules about how to use them  You may choose a combination of any of the following:  · Living will: This is a written record of the treatment you want  You can also choose which treatments you do not want, which to limit, and which to stop at a certain time  This includes surgery, medicine, IV fluid, and tube feedings  · Durable power of  for healthcare [facility]): This is a written record that states who you want to make healthcare choices for you when you are unable to make them for yourself  This person, called a proxy, is usually a family member or a friend  You may choose more than 1 proxy  · Do not resuscitate (DNR) order:  A DNR order is used in case your heart stops beating or you stop breathing  It is a request not to have certain forms of treatment, such as CPR  A DNR order may be included in other types of advance directives  · Medical directive: This covers the care that you want if you are in a coma, near death, or unable to make decisions for yourself  You can list the treatments you want for each condition  Treatment may include pain medicine, surgery, blood transfusions, dialysis, IV or tube feedings, and a ventilator (breathing machine)  · Values history: This document has questions about your views, beliefs, and how you feel and think about life  This information can help others choose the care that you would choose  Why are advance directives important? An advance directive helps you control your care  Although spoken wishes may be used, it is better to have your wishes written down  Spoken wishes can be misunderstood, or not followed  Treatments may be given even if you do not want them  An advance directive may make it easier for your family to make difficult choices about your care     Weight Management   Why it is important to manage your weight:  Being overweight increases your risk of health conditions such as heart disease, high blood pressure, type 2 diabetes, and certain types of cancer  It can also increase your risk for osteoarthritis, sleep apnea, and other respiratory problems  Aim for a slow, steady weight loss  Even a small amount of weight loss can lower your risk of health problems  How to lose weight safely:  A safe and healthy way to lose weight is to eat fewer calories and get regular exercise  You can lose up about 1 pound a week by decreasing the number of calories you eat by 500 calories each day  Healthy meal plan for weight management:  A healthy meal plan includes a variety of foods, contains fewer calories, and helps you stay healthy  A healthy meal plan includes the following:  · Eat whole-grain foods more often  A healthy meal plan should contain fiber  Fiber is the part of grains, fruits, and vegetables that is not broken down by your body  Whole-grain foods are healthy and provide extra fiber in your diet  Some examples of whole-grain foods are whole-wheat breads and pastas, oatmeal, brown rice, and bulgur  · Eat a variety of vegetables every day  Include dark, leafy greens such as spinach, kale, siemon greens, and mustard greens  Eat yellow and orange vegetables such as carrots, sweet potatoes, and winter squash  · Eat a variety of fruits every day  Choose fresh or canned fruit (canned in its own juice or light syrup) instead of juice  Fruit juice has very little or no fiber  · Eat low-fat dairy foods  Drink fat-free (skim) milk or 1% milk  Eat fat-free yogurt and low-fat cottage cheese  Try low-fat cheeses such as mozzarella and other reduced-fat cheeses  · Choose meat and other protein foods that are low in fat  Choose beans or other legumes such as split peas or lentils  Choose fish, skinless poultry (chicken or turkey), or lean cuts of red meat (beef or pork)  Before you cook meat or poultry, cut off any visible fat  · Use less fat and oil  Try baking foods instead of frying them   Add less fat, such as margarine, sour cream, regular salad dressing and mayonnaise to foods  Eat fewer high-fat foods  Some examples of high-fat foods include french fries, doughnuts, ice cream, and cakes  · Eat fewer sweets  Limit foods and drinks that are high in sugar  This includes candy, cookies, regular soda, and sweetened drinks  Exercise:  Exercise at least 30 minutes per day on most days of the week  Some examples of exercise include walking, biking, dancing, and swimming  You can also fit in more physical activity by taking the stairs instead of the elevator or parking farther away from stores  Ask your healthcare provider about the best exercise plan for you  © Copyright [employer] 2018 Information is for End User's use only and may not be sold, redistributed or otherwise used for commercial purposes   All illustrations and images included in CareNotes® are the copyrighted property of A D A M , Inc  or [address] [place] [address]

## 2020-10-02 ENCOUNTER — NURSE TRIAGE (OUTPATIENT)
Dept: OTHER | Facility: OTHER | Age: 75
End: 2020-10-02

## 2020-10-02 DIAGNOSIS — Z11.59 SPECIAL SCREENING EXAMINATION FOR VIRAL DISEASE: Primary | ICD-10-CM

## 2020-10-03 DIAGNOSIS — Z11.59 SPECIAL SCREENING EXAMINATION FOR VIRAL DISEASE: ICD-10-CM

## 2020-10-03 PROCEDURE — U0003 INFECTIOUS AGENT DETECTION BY NUCLEIC ACID (DNA OR RNA); SEVERE ACUTE RESPIRATORY SYNDROME CORONAVIRUS 2 (SARS-COV-2) (CORONAVIRUS DISEASE [COVID-19]), AMPLIFIED PROBE TECHNIQUE, MAKING USE OF HIGH THROUGHPUT TECHNOLOGIES AS DESCRIBED BY CMS-2020-01-R: HCPCS | Performed by: FAMILY MEDICINE

## 2020-10-04 LAB — SARS-COV-2 RNA SPEC QL NAA+PROBE: NOT DETECTED

## 2020-12-03 ENCOUNTER — LAB (OUTPATIENT)
Dept: LAB | Age: 75
End: 2020-12-03
Payer: MEDICARE

## 2020-12-03 ENCOUNTER — OFFICE VISIT (OUTPATIENT)
Dept: INTERNAL MEDICINE CLINIC | Age: 75
End: 2020-12-03
Payer: MEDICARE

## 2020-12-03 VITALS
DIASTOLIC BLOOD PRESSURE: 80 MMHG | WEIGHT: 168.6 LBS | HEART RATE: 86 BPM | HEIGHT: 65 IN | BODY MASS INDEX: 28.09 KG/M2 | TEMPERATURE: 97.5 F | SYSTOLIC BLOOD PRESSURE: 110 MMHG | OXYGEN SATURATION: 97 %

## 2020-12-03 DIAGNOSIS — J30.1 NON-SEASONAL ALLERGIC RHINITIS DUE TO POLLEN: ICD-10-CM

## 2020-12-03 DIAGNOSIS — R06.02 SHORTNESS OF BREATH: ICD-10-CM

## 2020-12-03 DIAGNOSIS — M25.511 ACUTE PAIN OF RIGHT SHOULDER: Primary | ICD-10-CM

## 2020-12-03 DIAGNOSIS — R06.00 DOE (DYSPNEA ON EXERTION): ICD-10-CM

## 2020-12-03 DIAGNOSIS — K21.9 GASTROESOPHAGEAL REFLUX DISEASE WITHOUT ESOPHAGITIS: ICD-10-CM

## 2020-12-03 DIAGNOSIS — R06.02 SOB (SHORTNESS OF BREATH): ICD-10-CM

## 2020-12-03 DIAGNOSIS — R00.2 PALPITATION: ICD-10-CM

## 2020-12-03 DIAGNOSIS — R00.2 PALPITATION: Primary | ICD-10-CM

## 2020-12-03 PROBLEM — R06.09 DOE (DYSPNEA ON EXERTION): Status: ACTIVE | Noted: 2020-12-03

## 2020-12-03 LAB — TSH SERPL DL<=0.05 MIU/L-ACNC: 1.63 UIU/ML (ref 0.36–3.74)

## 2020-12-03 PROCEDURE — 82785 ASSAY OF IGE: CPT

## 2020-12-03 PROCEDURE — 99214 OFFICE O/P EST MOD 30 MIN: CPT | Performed by: FAMILY MEDICINE

## 2020-12-03 PROCEDURE — 86003 ALLG SPEC IGE CRUDE XTRC EA: CPT

## 2020-12-03 PROCEDURE — 36415 COLL VENOUS BLD VENIPUNCTURE: CPT

## 2020-12-03 PROCEDURE — 84443 ASSAY THYROID STIM HORMONE: CPT

## 2020-12-03 RX ORDER — PANTOPRAZOLE SODIUM 40 MG/1
40 TABLET, DELAYED RELEASE ORAL
Qty: 30 TABLET | Refills: 1 | Status: SHIPPED | OUTPATIENT
Start: 2020-12-03 | End: 2021-04-05 | Stop reason: SDUPTHER

## 2020-12-04 ENCOUNTER — APPOINTMENT (OUTPATIENT)
Dept: RADIOLOGY | Age: 75
End: 2020-12-04
Payer: MEDICARE

## 2020-12-04 DIAGNOSIS — R06.02 SOB (SHORTNESS OF BREATH): ICD-10-CM

## 2020-12-04 DIAGNOSIS — M25.511 ACUTE PAIN OF RIGHT SHOULDER: ICD-10-CM

## 2020-12-04 DIAGNOSIS — M25.552 HIP PAIN, ACUTE, LEFT: ICD-10-CM

## 2020-12-04 LAB
A ALTERNATA IGE QN: <0.1 KUA/I
A FUMIGATUS IGE QN: <0.1 KUA/I
ALLERGEN COMMENT: NORMAL
ALLERGEN COMMENT: NORMAL
ALMOND IGE QN: <0.1 KUA/I
BERMUDA GRASS IGE QN: <0.1 KUA/I
BOXELDER IGE QN: <0.1 KUA/I
C HERBARUM IGE QN: <0.1 KUA/I
CASHEW NUT IGE QN: <0.1 KUA/I
CAT DANDER IGE QN: <0.1 KUA/I
CMN PIGWEED IGE QN: <0.1 KUA/I
CODFISH IGE QN: <0.1 KUA/I
COMMON RAGWEED IGE QN: <0.1 KUA/I
COTTONWOOD IGE QN: <0.1 KUA/I
D FARINAE IGE QN: <0.1 KUA/I
D PTERONYSS IGE QN: <0.1 KUA/I
DOG DANDER IGE QN: <0.1 KUA/I
EGG WHITE IGE QN: <0.1 KUA/I
GLUTEN IGE QN: <0.1 KUA/I
HAZELNUT IGE QN: <0.1 KUA/L
LONDON PLANE IGE QN: <0.1 KUA/I
MILK IGE QN: <0.1 KUA/I
MOUSE URINE PROT IGE QN: <0.1 KUA/I
MT JUNIPER IGE QN: <0.1 KUA/I
MUGWORT IGE QN: <0.1 KUA/I
P NOTATUM IGE QN: <0.1 KUA/I
PEANUT IGE QN: <0.1 KUA/I
ROACH IGE QN: <0.1 KUA/I
SALMON IGE QN: <0.1 KUA/I
SCALLOP IGE QN: <0.1 KUA/L
SESAME SEED IGE QN: <0.1 KUA/I
SHEEP SORREL IGE QN: <0.1 KUA/I
SHRIMP IGE QN: <0.1 KUA/L
SILVER BIRCH IGE QN: <0.1 KUA/I
SOYBEAN IGE QN: <0.1 KUA/I
TIMOTHY IGE QN: <0.1 KUA/I
TOTAL IGE SMQN RAST: 7.83 KU/L (ref 0–113)
TOTAL IGE SMQN RAST: 8.27 KU/L (ref 0–113)
TUNA IGE QN: <0.1 KUA/I
WALNUT IGE QN: <0.1 KUA/I
WALNUT IGE QN: <0.1 KUA/I
WHEAT IGE QN: <0.1 KUA/I
WHITE ASH IGE QN: <0.1 KUA/I
WHITE ELM IGE QN: <0.1 KUA/I
WHITE MULBERRY IGE QN: <0.1 KUA/I
WHITE OAK IGE QN: <0.1 KUA/I

## 2020-12-04 PROCEDURE — 71046 X-RAY EXAM CHEST 2 VIEWS: CPT

## 2020-12-04 PROCEDURE — 73030 X-RAY EXAM OF SHOULDER: CPT

## 2020-12-04 PROCEDURE — 93000 ELECTROCARDIOGRAM COMPLETE: CPT | Performed by: FAMILY MEDICINE

## 2020-12-04 PROCEDURE — 73502 X-RAY EXAM HIP UNI 2-3 VIEWS: CPT

## 2021-01-05 LAB — COLOGUARD RESULT REPORTABLE: NEGATIVE

## 2021-01-08 ENCOUNTER — TELEPHONE (OUTPATIENT)
Dept: INTERNAL MEDICINE CLINIC | Age: 76
End: 2021-01-08

## 2021-01-14 ENCOUNTER — HOSPITAL ENCOUNTER (OUTPATIENT)
Dept: NON INVASIVE DIAGNOSTICS | Facility: CLINIC | Age: 76
Discharge: HOME | End: 2021-01-14
Payer: MEDICARE

## 2021-01-14 DIAGNOSIS — R00.2 PALPITATION: ICD-10-CM

## 2021-01-14 DIAGNOSIS — R06.02 SOB (SHORTNESS OF BREATH): ICD-10-CM

## 2021-01-14 PROCEDURE — 93306 TTE W/DOPPLER COMPLETE: CPT | Performed by: INTERNAL MEDICINE

## 2021-01-14 PROCEDURE — 93306 TTE W/DOPPLER COMPLETE: CPT

## 2021-01-14 PROCEDURE — 93225 XTRNL ECG REC<48 HRS REC: CPT

## 2021-01-14 PROCEDURE — 93226 XTRNL ECG REC<48 HR SCAN A/R: CPT

## 2021-01-22 PROCEDURE — 93227 XTRNL ECG REC<48 HR R&I: CPT | Performed by: INTERNAL MEDICINE

## 2021-02-08 ENCOUNTER — TELEPHONE (OUTPATIENT)
Dept: OTHER | Facility: OTHER | Age: 76
End: 2021-02-08

## 2021-03-01 DIAGNOSIS — E78.5 HYPERLIPIDEMIA, UNSPECIFIED HYPERLIPIDEMIA TYPE: ICD-10-CM

## 2021-03-01 DIAGNOSIS — F41.9 ANXIETY: ICD-10-CM

## 2021-03-01 RX ORDER — SERTRALINE HYDROCHLORIDE 100 MG/1
100 TABLET, FILM COATED ORAL
Qty: 90 TABLET | Refills: 1 | Status: SHIPPED | OUTPATIENT
Start: 2021-03-01 | End: 2021-08-31 | Stop reason: SDUPTHER

## 2021-03-01 RX ORDER — SIMVASTATIN 20 MG
20 TABLET ORAL
Qty: 90 TABLET | Refills: 1 | Status: SHIPPED | OUTPATIENT
Start: 2021-03-01 | End: 2021-08-31 | Stop reason: SDUPTHER

## 2021-04-02 ENCOUNTER — TRANSCRIBE ORDERS (OUTPATIENT)
Dept: ADMINISTRATIVE | Age: 76
End: 2021-04-02

## 2021-04-02 ENCOUNTER — APPOINTMENT (OUTPATIENT)
Dept: LAB | Age: 76
End: 2021-04-02
Payer: MEDICARE

## 2021-04-02 DIAGNOSIS — M85.89 OSTEOPENIA OF MULTIPLE SITES: ICD-10-CM

## 2021-04-02 DIAGNOSIS — E78.00 HYPERCHOLESTEROLEMIA: ICD-10-CM

## 2021-04-02 DIAGNOSIS — M94.9 DISORDER OF CARTILAGE, UNSPECIFIED: ICD-10-CM

## 2021-04-02 DIAGNOSIS — E78.5 HYPERLIPIDEMIA, UNSPECIFIED HYPERLIPIDEMIA TYPE: ICD-10-CM

## 2021-04-02 DIAGNOSIS — E78.5 HYPERLIPIDEMIA, UNSPECIFIED HYPERLIPIDEMIA TYPE: Primary | ICD-10-CM

## 2021-04-02 LAB
25(OH)D3 SERPL-MCNC: 34.3 NG/ML (ref 30–100)
ALBUMIN SERPL BCP-MCNC: 4 G/DL (ref 3.5–5)
ALP SERPL-CCNC: 57 U/L (ref 46–116)
ALT SERPL W P-5'-P-CCNC: 23 U/L (ref 12–78)
ANION GAP SERPL CALCULATED.3IONS-SCNC: 3 MMOL/L (ref 4–13)
AST SERPL W P-5'-P-CCNC: 18 U/L (ref 5–45)
BILIRUB SERPL-MCNC: 0.39 MG/DL (ref 0.2–1)
BUN SERPL-MCNC: 16 MG/DL (ref 5–25)
CALCIUM SERPL-MCNC: 9.6 MG/DL (ref 8.3–10.1)
CHLORIDE SERPL-SCNC: 111 MMOL/L (ref 100–108)
CHOLEST SERPL-MCNC: 195 MG/DL (ref 50–200)
CK SERPL-CCNC: 76 U/L (ref 26–192)
CO2 SERPL-SCNC: 26 MMOL/L (ref 21–32)
CREAT SERPL-MCNC: 0.59 MG/DL (ref 0.6–1.3)
GFR SERPL CREATININE-BSD FRML MDRD: 91 ML/MIN/1.73SQ M
GLUCOSE P FAST SERPL-MCNC: 103 MG/DL (ref 65–99)
HDLC SERPL-MCNC: 80 MG/DL
LDLC SERPL CALC-MCNC: 96 MG/DL (ref 0–100)
NONHDLC SERPL-MCNC: 115 MG/DL
POTASSIUM SERPL-SCNC: 4.6 MMOL/L (ref 3.5–5.3)
PROT SERPL-MCNC: 7.2 G/DL (ref 6.4–8.2)
SODIUM SERPL-SCNC: 140 MMOL/L (ref 136–145)
TRIGL SERPL-MCNC: 93 MG/DL

## 2021-04-02 PROCEDURE — 82550 ASSAY OF CK (CPK): CPT

## 2021-04-02 PROCEDURE — 80061 LIPID PANEL: CPT

## 2021-04-02 PROCEDURE — 82306 VITAMIN D 25 HYDROXY: CPT

## 2021-04-02 PROCEDURE — 80053 COMPREHEN METABOLIC PANEL: CPT

## 2021-04-02 PROCEDURE — 36415 COLL VENOUS BLD VENIPUNCTURE: CPT

## 2021-04-05 ENCOUNTER — OFFICE VISIT (OUTPATIENT)
Dept: INTERNAL MEDICINE CLINIC | Age: 76
End: 2021-04-05
Payer: MEDICARE

## 2021-04-05 VITALS
WEIGHT: 175.5 LBS | SYSTOLIC BLOOD PRESSURE: 102 MMHG | TEMPERATURE: 98.4 F | BODY MASS INDEX: 29.24 KG/M2 | HEART RATE: 85 BPM | HEIGHT: 65 IN | DIASTOLIC BLOOD PRESSURE: 60 MMHG | OXYGEN SATURATION: 95 %

## 2021-04-05 DIAGNOSIS — M85.89 OSTEOPENIA OF MULTIPLE SITES: ICD-10-CM

## 2021-04-05 DIAGNOSIS — F32.89 OTHER DEPRESSION: ICD-10-CM

## 2021-04-05 DIAGNOSIS — K21.9 GASTROESOPHAGEAL REFLUX DISEASE WITHOUT ESOPHAGITIS: ICD-10-CM

## 2021-04-05 DIAGNOSIS — E78.00 HYPERCHOLESTEROLEMIA: Primary | ICD-10-CM

## 2021-04-05 DIAGNOSIS — G56.03 BILATERAL CARPAL TUNNEL SYNDROME: ICD-10-CM

## 2021-04-05 PROBLEM — R06.00 DOE (DYSPNEA ON EXERTION): Status: RESOLVED | Noted: 2020-12-03 | Resolved: 2021-04-05

## 2021-04-05 PROBLEM — R06.09 DOE (DYSPNEA ON EXERTION): Status: RESOLVED | Noted: 2020-12-03 | Resolved: 2021-04-05

## 2021-04-05 PROBLEM — R06.02 SOB (SHORTNESS OF BREATH): Status: RESOLVED | Noted: 2020-12-03 | Resolved: 2021-04-05

## 2021-04-05 PROBLEM — R00.2 PALPITATION: Status: RESOLVED | Noted: 2020-12-03 | Resolved: 2021-04-05

## 2021-04-05 PROBLEM — M25.511 ACUTE PAIN OF RIGHT SHOULDER: Status: RESOLVED | Noted: 2020-12-03 | Resolved: 2021-04-05

## 2021-04-05 PROCEDURE — 99214 OFFICE O/P EST MOD 30 MIN: CPT | Performed by: FAMILY MEDICINE

## 2021-04-05 RX ORDER — GABAPENTIN 300 MG/1
300 CAPSULE ORAL
Qty: 90 CAPSULE | Refills: 1 | Status: SHIPPED | OUTPATIENT
Start: 2021-04-05 | End: 2021-11-11 | Stop reason: SDUPTHER

## 2021-04-05 RX ORDER — PANTOPRAZOLE SODIUM 40 MG/1
40 TABLET, DELAYED RELEASE ORAL
Qty: 30 TABLET | Refills: 5 | Status: SHIPPED | OUTPATIENT
Start: 2021-04-05 | End: 2022-05-31 | Stop reason: SDUPTHER

## 2021-04-05 NOTE — PROGRESS NOTES
Assessment/Plan:    1  Hypercholesterolemia  -     Comprehensive metabolic panel; Future  -     CBC and differential; Future  -     Lipid panel; Future    2  Bilateral carpal tunnel syndrome  -     gabapentin (NEURONTIN) 300 mg capsule; Take 1 capsule (300 mg total) by mouth daily at bedtime    3  Gastroesophageal reflux disease without esophagitis  -     pantoprazole (PROTONIX) 40 mg tablet; Take 1 tablet (40 mg total) by mouth daily before breakfast    4  Osteopenia of multiple sites    5  Other depression      BMI Counseling: Body mass index is 29 66 kg/m²  The BMI is above normal  Nutrition recommendations include moderation in carbohydrate intake  Exercise recommendations include exercising 3-5 times per week  No pharmacotherapy was ordered  There are no Patient Instructions on file for this visit  Return in about 6 months (around 10/5/2021) for Recheck  Subjective:      Patient ID: Neel Lenz is a 70 y o  female  Chief Complaint   Patient presents with    Follow-up     review labs 4/2/21        HPI    Hyperlipidemia (Follow-Up): The patient states her hyperlipidemia has been under good control since the last visit  She has no comorbid illnesses  Symptoms: denies chest pain,-denies intermittent leg claudication,-denies muscle pain-and-denies muscle weakness  Associated symptoms include no focal neurologic deficits-and-no memory loss  Medications: the patient is adherent with her medication regimen -the patient complains of medication side effects  The patient is doing well with her hyperlipidemia goals  the patient's LDL goal is <100 mg/dL  -the patient's last LDL was 72 mg/dL  -(5/2014)   February 2019, due for labs and has lab slip  August 2019, very well controlled  March 2020:  Not able to get labs done since they are closed right now however compliant with the medication and does not need refills     Gastroesophageal Reflux Disease (Follow-Up):  The patient is being seen for follow-up of gastroesophageal reflux disease   Is essentially resolved symptoms   Not on anything on a regular basis  March 2020, well controlled     Depression (Follow-Up): The patient states her depression has been stable since the last visit  She has no comorbid illnesses  Interval Events: doing well but  recently dx with lung cancer after brain surgery and colonoscopy  She has had no significant interval events  Interval Symptoms: improved depression,-improved depressed mood,-improved loss of interest or pleasure in activities,-stable insomnia,-denies feelings of worthlessness,-denies feelings of guilt,-denies trouble concentrating,-improved anxiety-and-denies sexual dysfunction   February 2019,  passed away in the winter 2018 but she has a very large support system and is doing relatively well   Taking it day by day  Associated symptoms include: no recent weight gain-and-no thoughts of suicide  Social Support: the patient has good social support  Medications: the patient is adherent with her medication regimen -She denies medication side effects   Pt has been on zoloft >5 years  Heena Generous she feels really stable on it and does not feel the need to go off of it August 2019, doing very well and does not require any medication changes  March 2020, doing very well with present dose Zoloft with no breakthrough symptoms  No suicidal or homicidal ideations   April 2021, no HI or SI and tolerating medication well  Symptoms are well controlled with no flare ups  Does not need a change in dose  Hyperlipidemia, on simvastatin 20 mg daily and levels are well controlled  No side effects  Osteopenia of multiple sites  Takes vitamin-D and calcium on a regular basis and is very active  Up-to-date on bone density study  Dyspnea on exertion, completely resolved and feels that it may have been anxiety related    Had negative stress test echocardiogram chest x-ray and no exacerbation of symptoms  GERD, stable with pantoprazole  She does need to take it on a daily basis and tolerates it well  The following portions of the patient's history were reviewed and updated as appropriate: allergies, current medications, past family history, past medical history, past social history, past surgical history and problem list     Review of Systems      Constitutional:  Denies fever or chills   Eyes:  Denies double or blurry vision  HENT:  Denies nasal congestion or sore throat   Respiratory:  Denies cough or shortness of breath or wheezing  Cardiovascular:  Denies palpitations or chest pain  GI:  Denies abdominal pain, nausea, or vomiting, no loose stools  Integument:  Denies rash , no open areas  Neurologic:  Denies headache or focal weakness, no dizziness          Current Outpatient Medications   Medication Sig Dispense Refill    ALPRAZolam (XANAX) 0 5 mg tablet Take 1 tablet (0 5 mg total) by mouth daily as needed (prn) 30 tablet 1    ascorbic acid (VITAMIN C) 500 mg tablet Take 500 mg by mouth daily      B Complex Vitamins (B COMPLEX PO) Take 1 capsule by mouth daily       CALCIUM PO Take 1 capsule by mouth daily       Cholecalciferol (VITAMIN D3 PO) Take 1 capsule by mouth daily       gabapentin (NEURONTIN) 300 mg capsule Take 1 capsule (300 mg total) by mouth daily at bedtime 90 capsule 1    pantoprazole (PROTONIX) 40 mg tablet Take 1 tablet (40 mg total) by mouth daily before breakfast 30 tablet 5    sertraline (ZOLOFT) 100 mg tablet Take 1 tablet (100 mg total) by mouth daily at bedtime 90 tablet 1    simvastatin (ZOCOR) 20 mg tablet Take 1 tablet (20 mg total) by mouth daily at bedtime 90 tablet 1     No current facility-administered medications for this visit          Objective:    /60 (BP Location: Left arm, Patient Position: Sitting, Cuff Size: Adult)   Pulse 85   Temp 98 4 °F (36 9 °C) (Temporal)   Ht 5' 4 5" (1 638 m)   Wt 79 6 kg (175 lb 8 oz)   SpO2 95%   BMI 29 66 kg/m²        Physical Exam       Constitutional:  Well developed, well nourished, no acute distress, non-toxic appearance   Eyes:  PERRL, conjunctiva normal , non icteric sclera  HENT:  Atraumatic, oropharynx moist  Neck-  supple   Respiratory:  CTA b/l, normal breath sounds, no rales, no wheezing   Cardiovascular:  RRR, no murmurs, no LE edema b/l  GI:  Soft, nondistended, normal bowel sounds x 4, nontender, no organomegaly, no mass, no rebound, no guarding   Neurologic:  no focal deficits noted   Psychiatric:  Speech and behavior appropriate , AAO x 3        Juany Frank, DO

## 2021-04-08 ENCOUNTER — HOSPITAL ENCOUNTER (OUTPATIENT)
Dept: RADIOLOGY | Facility: IMAGING CENTER | Age: 76
Discharge: HOME | End: 2021-04-08
Payer: MEDICARE

## 2021-04-08 DIAGNOSIS — M85.89 OSTEOPENIA OF MULTIPLE SITES: ICD-10-CM

## 2021-04-08 PROCEDURE — 77080 DXA BONE DENSITY AXIAL: CPT

## 2021-06-03 DIAGNOSIS — F41.9 ANXIETY: ICD-10-CM

## 2021-06-03 RX ORDER — ALPRAZOLAM 0.5 MG/1
0.5 TABLET ORAL DAILY PRN
Qty: 30 TABLET | Refills: 1 | Status: SHIPPED | OUTPATIENT
Start: 2021-06-03 | End: 2022-01-25 | Stop reason: SDUPTHER

## 2021-08-31 ENCOUNTER — TELEMEDICINE (OUTPATIENT)
Dept: INTERNAL MEDICINE CLINIC | Facility: CLINIC | Age: 76
End: 2021-08-31
Payer: MEDICARE

## 2021-08-31 VITALS — HEIGHT: 65 IN | BODY MASS INDEX: 29.16 KG/M2 | WEIGHT: 175 LBS

## 2021-08-31 DIAGNOSIS — Z20.822 EXPOSURE TO COVID-19 VIRUS: Primary | ICD-10-CM

## 2021-08-31 DIAGNOSIS — E78.5 HYPERLIPIDEMIA, UNSPECIFIED HYPERLIPIDEMIA TYPE: ICD-10-CM

## 2021-08-31 DIAGNOSIS — B34.9 VIRAL INFECTION, UNSPECIFIED: ICD-10-CM

## 2021-08-31 DIAGNOSIS — F41.9 ANXIETY: ICD-10-CM

## 2021-08-31 PROCEDURE — U0005 INFEC AGEN DETEC AMPLI PROBE: HCPCS | Performed by: NURSE PRACTITIONER

## 2021-08-31 PROCEDURE — 99214 OFFICE O/P EST MOD 30 MIN: CPT | Performed by: NURSE PRACTITIONER

## 2021-08-31 PROCEDURE — U0003 INFECTIOUS AGENT DETECTION BY NUCLEIC ACID (DNA OR RNA); SEVERE ACUTE RESPIRATORY SYNDROME CORONAVIRUS 2 (SARS-COV-2) (CORONAVIRUS DISEASE [COVID-19]), AMPLIFIED PROBE TECHNIQUE, MAKING USE OF HIGH THROUGHPUT TECHNOLOGIES AS DESCRIBED BY CMS-2020-01-R: HCPCS | Performed by: NURSE PRACTITIONER

## 2021-08-31 RX ORDER — SIMVASTATIN 20 MG
20 TABLET ORAL
Qty: 90 TABLET | Refills: 1 | Status: SHIPPED | OUTPATIENT
Start: 2021-08-31 | End: 2022-03-10 | Stop reason: SDUPTHER

## 2021-08-31 RX ORDER — SERTRALINE HYDROCHLORIDE 100 MG/1
100 TABLET, FILM COATED ORAL
Qty: 90 TABLET | Refills: 1 | Status: SHIPPED | OUTPATIENT
Start: 2021-08-31 | End: 2022-03-10 | Stop reason: SDUPTHER

## 2021-08-31 NOTE — PROGRESS NOTES
COVID-19 Outpatient Progress Note    Assessment/Plan:    Problem List Items Addressed This Visit     None      Visit Diagnoses     Exposure to COVID-19 virus    -  Primary    Relevant Orders    Novel Coronavirus (Covid-19),PCR [facility] - Collected at [facility]    Viral infection, unspecified        Relevant Orders    Novel Coronavirus (Covid-19),PCR [facility] - Collected at [facility]    Hyperlipidemia, unspecified hyperlipidemia type        Relevant Medications    simvastatin (ZOCOR) 20 mg tablet    Anxiety        Relevant Medications    sertraline (ZOLOFT) 100 mg tablet         Disposition:     I referred patient to one of our centralized sites for a COVID-19 swab  Will test patient for COVID due to her exposure and symptoms  She would be interested in MAb treatment if she tests positive  Consented at this time for the order to be placed if a positive test results  Quarantine measures reviewed, hygiene measures reviewed  Continue symptomatic care  Reviewed red flag signs to call the office with or go to the Emergency Department with  Patient verbalizes understanding  Patient is at increased risk of progressing towards severe COVID-19 due to the following high risk criteria:   - Older age (age >= 72years old)  - Obesity or being overweight    Patient meets criteria for Casirivimab/Imdevimab administration for the treatment of COVID-19  They were counseled in regards to risks, benefits, and side effects of this infusion  Casirivimab and imdevimab are investigational medicines used to treat mild to moderate symptoms of COVID-19 in non-hospitalized adults and adolescents (15years of age and older who weigh at least 80 pounds (40 kg)), and who are at high risk for developing severe COVID-19 symptoms or the need for hospitalization  Casirivimab and imdevimab are investigational because they are still being studied   There is limited information known about the safety and effectiveness of using casirivimab and imdevimab to treat people with COVID-19  The FDA has authorized the emergency use of casirivimab and imdevimab for the treatment of COVID-19 under an Emergency Use Authorization (EUA)  Possible side effects of casirivimab and imdevimab: Allergic reactions can happen during and after infusion with casirivimab and imdevimab  Possible reactions include: fever, chills, nausea, headache, shortness of breath, low blood pressure, wheezing, swelling of your lips, face, or throat, rash including hives, itching, muscle aches, and dizziness  The side effects of getting any medicine by vein may include brief pain, bleeding, bruising of the skin, soreness, swelling, and possible infection at the infusion site  These are not all the possible side effects of casirivimab and imdevimab  Not a lot of people have been given casirivimab and imdevimab  Serious and unexpected side effects may happen  Casirivimab and imdevimab are still being studied so it is possible that all of the risks are not known at this time  It is possible that casirivimab and imdevimab could interfere with your body's own ability to fight off a future infection of SARS-CoV-2  Similarly, casirivimab and imdevimab may reduce your body's immune response to a vaccine for SARS-CoV-2  Specific studies have not been conducted to address these possible risks  Emergency Use Authorization:    The [facility] FDA has made casirivimab and imdevimab available under an emergency access mechanism called an EUA  The EUA is supported by a  of Health and Human Service (HHS) declaration that circumstances exist to justify the emergency use of drugs and biological products during the COVID-19 pandemic  Casirivimab and imdevimab have not undergone the same type of review as an FDA-approved or cleared product       The FDA may issue an EUA when certain criteria are met, which includes that there are no adequate, approved, available alternatives  In addition, the FDA decision is based on the totality of scientific evidence available showing that it is reasonable to believe that the product meets certain criteria for safety, performance, and labeling and may be effective in treatment of patients during the COVID-19 pandemic  All of these criteria must be met to allow for the product to be used in the treatment of patients during the COVID-19 pandemic  The EUA for casirivimab and imdevimab is in effect for the duration of the COVID-19 declaration justifying emergency use of these products, unless terminated or revoked (after which the products may no longer be used)  Regarding COVID-19 Vaccination:    Currently there is no data or safety or efficacy of COVID-19 vaccination in persons who received monoclonal antibodies as part of COVID-19 treatment  Treatment should be deferred for at least 90 days to avoid interference of the treatment with vaccine-induced immune responses (this is based on estimated half-life of therapies and evidence suggesting reinfection is uncommon within 90 days of initial infection)  The patient consents to proceed with casirivimab and imdevimab administration  I have spent 19 minutes directly with the patient  Greater than 50% of this time was spent in counseling/coordination of care regarding: prognosis, risks and benefits of treatment options, instructions for management, patient and family education, importance of treatment compliance, risk factor reductions and impressions  Verification of patient location:    Patient is located in the following state in which I hold an active license [place]    Encounter provider Governor BRYNA Ray    Provider located at [address] [place] [address] [address] [facility] [id number]    Recent Visits  No visits were found meeting these conditions    Showing recent visits within past 7 days and meeting all other requirements  Today's Visits  Date Type Provider Dept   08/31/21 Telemedicine BRYAN [facility]   Showing today's visits and meeting all other requirements  Future Appointments  No visits were found meeting these conditions  Showing future appointments within next 150 days and meeting all other requirements     This virtual check-in was done via [employer] and patient was informed that this is a secure, HIPAA-compliant platform  She agrees to proceed  Patient agrees to participate in a virtual check in via telephone or video visit instead of presenting to the office to address urgent/immediate medical needs  Patient is aware this is a billable service  After connecting through [facility], the patient was identified by name and date of birth  Meng Dorado was informed that this was a telemedicine visit and that the exam was being conducted confidentially over secure lines  My office door was closed  No one else was in the room  Meng Dordao acknowledged consent and understanding of privacy and security of the telemedicine visit  I informed the patient that I have reviewed her record in Epic and presented the opportunity for her to ask any questions regarding the visit today  The patient agreed to participate  Subjective:   Meng Dorado is a 78 y o  female who is concerned about COVID-19  Patient's symptoms include fatigue and headache  Patient denies fever (98 6 today), chills, congestion, rhinorrhea, sore throat, anosmia, loss of taste, cough, shortness of breath, chest tightness, abdominal pain, nausea, vomiting, diarrhea and myalgias       Date of symptom onset: 8/30/2021  Date of exposure: 8/25/2021  COVID-19 vaccination status: Fully vaccinated    Exposure:   Contact with a person who is under investigation (PUI) for or who is positive for COVID-19 within the last 14 days?: Yes    Hospitalized recently for fever and/or lower respiratory symptoms?: No      Currently a healthcare worker that is involved in direct patient care?: No      Works in a special setting where the risk of COVID-19 transmission may be high? (this may include long-term care, correctional and [facility] facilities; homeless shelters; assisted-living facilities and group homes ): No      Resident in a special setting where the risk of COVID-19 transmission may be high? (this may include long-term care, correctional and [facility] facilities; homeless shelters; assisted-living facilities and group homes ): No      Last Wednesday, she had contact with her grandchildren, who have since tested positive for COVID  Her symptoms are improved with ibuprofen       Lab Results   Component Value Date    SARSCOV2 Not Detected 10/03/2020     Past Medical History:   Diagnosis Date    Hyperlipidemia     Skin tag     last assessed 4/21/17, resolved 10/2/17      Past Surgical History:   Procedure Laterality Date    COLONOSCOPY      KNEE ARTHROSCOPY Left     KNEE ARTHROSCOPY W/ MENISCAL REPAIR Left 2000    [place] TOTAL KNEE ARTHROPLASTY Left 10/24/2019    Procedure: ARTHROPLASTY KNEE TOTAL;  Surgeon: Zachary Kathleen MD;  [facility];  Service: Orthopedics    TONSILLECTOMY AND ADENOIDECTOMY       Current Outpatient Medications   Medication Sig Dispense Refill    ALPRAZolam (XANAX) 0 5 mg tablet Take 1 tablet (0 5 mg total) by mouth daily as needed (prn) 30 tablet 1    B Complex Vitamins (B COMPLEX PO) Take 1 capsule by mouth daily       CALCIUM PO Take 1,200 mg by mouth daily       Cholecalciferol (VITAMIN D3 PO) Take 1 capsule by mouth daily       gabapentin (NEURONTIN) 300 mg capsule Take 1 capsule (300 mg total) by mouth daily at bedtime 90 capsule 1    pantoprazole (PROTONIX) 40 mg tablet Take 1 tablet (40 mg total) by mouth daily before breakfast (Patient taking differently: Take 40 mg by mouth daily as needed ) 30 tablet 5    sertraline (ZOLOFT) 100 mg tablet Take 1 tablet (100 mg total) by mouth daily at bedtime 90 tablet 1    simvastatin (ZOCOR) 20 mg tablet Take 1 tablet (20 mg total) by mouth daily at bedtime 90 tablet 1    ascorbic acid (VITAMIN C) 500 mg tablet Take 500 mg by mouth daily (Patient not taking: Reported on 8/31/2021)       No current facility-administered medications for this visit  Allergies   Allergen Reactions    Tetracyclines & Related      Blisters, yeast infection       Review of Systems   Constitutional: Positive for fatigue  Negative for chills and fever (98 6 today)  HENT: Positive for sinus pain  Negative for congestion, rhinorrhea and sore throat  Respiratory: Negative for cough, chest tightness and shortness of breath  Cardiovascular: Negative for chest pain and leg swelling  Gastrointestinal: Negative for abdominal pain, diarrhea, nausea and vomiting  Musculoskeletal: Negative for myalgias  Skin: Negative for rash  Neurological: Positive for headaches  Negative for dizziness and light-headedness  Objective:    Vitals:    08/31/21 1052   Weight: 79 4 kg (175 lb)   Height: 5' 4 5" (1 638 m)       Physical Exam  Constitutional:       General: She is not in acute distress  Appearance: She is well-developed  She is not ill-appearing  HENT:      Head: Normocephalic and atraumatic  Eyes:      General: No scleral icterus  Pulmonary:      Effort: Pulmonary effort is normal    Abdominal:      Palpations: Abdomen is soft  Musculoskeletal:      Cervical back: Normal range of motion  Skin:     Coloration: Skin is not pale  Neurological:      Mental Status: She is alert and oriented to person, place, and time  Psychiatric:         Behavior: Behavior normal          Thought Content: Thought content normal          VIRTUAL VISIT DISCLAIMER    Merced Griffith verbally agrees to participate in [employer]   Pt is aware that Virtual Care Services could be limited without vital signs or the ability to perform a full hands-on physical exam  Jaja Griffith understands she or the provider may request at any time to terminate the video visit and request the patient to seek care or treatment in person

## 2021-09-28 ENCOUNTER — HOSPITAL ENCOUNTER (OUTPATIENT)
Dept: RADIOLOGY | Age: 76
Discharge: HOME | End: 2021-09-28
Payer: MEDICARE

## 2021-09-28 VITALS — WEIGHT: 168 LBS | HEIGHT: 64 IN | BODY MASS INDEX: 28.68 KG/M2

## 2021-09-28 DIAGNOSIS — Z12.31 ENCOUNTER FOR SCREENING MAMMOGRAM FOR MALIGNANT NEOPLASM OF BREAST: ICD-10-CM

## 2021-09-28 PROCEDURE — 77067 SCR MAMMO BI INCL CAD: CPT

## 2021-09-28 PROCEDURE — 77063 BREAST TOMOSYNTHESIS BI: CPT

## 2021-09-29 ENCOUNTER — APPOINTMENT (OUTPATIENT)
Dept: LAB | Age: 76
End: 2021-09-29
Payer: MEDICARE

## 2021-09-29 DIAGNOSIS — E78.00 HYPERCHOLESTEROLEMIA: ICD-10-CM

## 2021-09-29 LAB
ALBUMIN SERPL BCP-MCNC: 3.8 G/DL (ref 3.5–5)
ALP SERPL-CCNC: 54 U/L (ref 46–116)
ALT SERPL W P-5'-P-CCNC: 25 U/L (ref 12–78)
ANION GAP SERPL CALCULATED.3IONS-SCNC: 1 MMOL/L (ref 4–13)
AST SERPL W P-5'-P-CCNC: 16 U/L (ref 5–45)
BASOPHILS # BLD AUTO: 0.05 THOUSANDS/ΜL (ref 0–0.1)
BASOPHILS NFR BLD AUTO: 1 % (ref 0–1)
BILIRUB SERPL-MCNC: 0.61 MG/DL (ref 0.2–1)
BUN SERPL-MCNC: 14 MG/DL (ref 5–25)
CALCIUM SERPL-MCNC: 9.2 MG/DL (ref 8.3–10.1)
CHLORIDE SERPL-SCNC: 111 MMOL/L (ref 100–108)
CHOLEST SERPL-MCNC: 200 MG/DL (ref 50–200)
CO2 SERPL-SCNC: 28 MMOL/L (ref 21–32)
CREAT SERPL-MCNC: 0.72 MG/DL (ref 0.6–1.3)
EOSINOPHIL # BLD AUTO: 0.27 THOUSAND/ΜL (ref 0–0.61)
EOSINOPHIL NFR BLD AUTO: 5 % (ref 0–6)
ERYTHROCYTE [DISTWIDTH] IN BLOOD BY AUTOMATED COUNT: 12.6 % (ref 11.6–15.1)
GFR SERPL CREATININE-BSD FRML MDRD: 83 ML/MIN/1.73SQ M
GLUCOSE P FAST SERPL-MCNC: 102 MG/DL (ref 65–99)
HCT VFR BLD AUTO: 43.2 % (ref 34.8–46.1)
HDLC SERPL-MCNC: 67 MG/DL
HGB BLD-MCNC: 13.9 G/DL (ref 11.5–15.4)
IMM GRANULOCYTES # BLD AUTO: 0.01 THOUSAND/UL (ref 0–0.2)
IMM GRANULOCYTES NFR BLD AUTO: 0 % (ref 0–2)
LDLC SERPL CALC-MCNC: 110 MG/DL (ref 0–100)
LYMPHOCYTES # BLD AUTO: 1.71 THOUSANDS/ΜL (ref 0.6–4.47)
LYMPHOCYTES NFR BLD AUTO: 29 % (ref 14–44)
MCH RBC QN AUTO: 31.2 PG (ref 26.8–34.3)
MCHC RBC AUTO-ENTMCNC: 32.2 G/DL (ref 31.4–37.4)
MCV RBC AUTO: 97 FL (ref 82–98)
MONOCYTES # BLD AUTO: 0.46 THOUSAND/ΜL (ref 0.17–1.22)
MONOCYTES NFR BLD AUTO: 8 % (ref 4–12)
NEUTROPHILS # BLD AUTO: 3.46 THOUSANDS/ΜL (ref 1.85–7.62)
NEUTS SEG NFR BLD AUTO: 57 % (ref 43–75)
NONHDLC SERPL-MCNC: 133 MG/DL
NRBC BLD AUTO-RTO: 0 /100 WBCS
PLATELET # BLD AUTO: 210 THOUSANDS/UL (ref 149–390)
PMV BLD AUTO: 11 FL (ref 8.9–12.7)
POTASSIUM SERPL-SCNC: 4.2 MMOL/L (ref 3.5–5.3)
PROT SERPL-MCNC: 7.2 G/DL (ref 6.4–8.2)
RBC # BLD AUTO: 4.45 MILLION/UL (ref 3.81–5.12)
SODIUM SERPL-SCNC: 140 MMOL/L (ref 136–145)
TRIGL SERPL-MCNC: 117 MG/DL
WBC # BLD AUTO: 5.96 THOUSAND/UL (ref 4.31–10.16)

## 2021-09-29 PROCEDURE — 85025 COMPLETE CBC W/AUTO DIFF WBC: CPT

## 2021-09-29 PROCEDURE — 36415 COLL VENOUS BLD VENIPUNCTURE: CPT

## 2021-09-29 PROCEDURE — 80053 COMPREHEN METABOLIC PANEL: CPT

## 2021-09-29 PROCEDURE — 80061 LIPID PANEL: CPT

## 2021-11-11 ENCOUNTER — OFFICE VISIT (OUTPATIENT)
Dept: INTERNAL MEDICINE CLINIC | Age: 76
End: 2021-11-11
Payer: MEDICARE

## 2021-11-11 VITALS
BODY MASS INDEX: 29.59 KG/M2 | DIASTOLIC BLOOD PRESSURE: 70 MMHG | SYSTOLIC BLOOD PRESSURE: 106 MMHG | WEIGHT: 177.6 LBS | HEART RATE: 77 BPM | HEIGHT: 65 IN | TEMPERATURE: 97.8 F | OXYGEN SATURATION: 98 %

## 2021-11-11 DIAGNOSIS — Z23 NEEDS FLU SHOT: ICD-10-CM

## 2021-11-11 DIAGNOSIS — F32.89 OTHER DEPRESSION: ICD-10-CM

## 2021-11-11 DIAGNOSIS — F41.9 ANXIETY: ICD-10-CM

## 2021-11-11 DIAGNOSIS — E78.00 HYPERCHOLESTEROLEMIA: ICD-10-CM

## 2021-11-11 DIAGNOSIS — K21.9 GASTROESOPHAGEAL REFLUX DISEASE WITHOUT ESOPHAGITIS: ICD-10-CM

## 2021-11-11 DIAGNOSIS — G56.03 BILATERAL CARPAL TUNNEL SYNDROME: ICD-10-CM

## 2021-11-11 DIAGNOSIS — M85.89 OSTEOPENIA OF MULTIPLE SITES: ICD-10-CM

## 2021-11-11 DIAGNOSIS — Z00.00 MEDICARE ANNUAL WELLNESS VISIT, SUBSEQUENT: Primary | ICD-10-CM

## 2021-11-11 PROCEDURE — G0008 ADMIN INFLUENZA VIRUS VAC: HCPCS

## 2021-11-11 PROCEDURE — G0439 PPPS, SUBSEQ VISIT: HCPCS | Performed by: FAMILY MEDICINE

## 2021-11-11 PROCEDURE — 1123F ACP DISCUSS/DSCN MKR DOCD: CPT | Performed by: FAMILY MEDICINE

## 2021-11-11 PROCEDURE — 99214 OFFICE O/P EST MOD 30 MIN: CPT | Performed by: FAMILY MEDICINE

## 2021-11-11 PROCEDURE — 90662 IIV NO PRSV INCREASED AG IM: CPT

## 2021-11-11 RX ORDER — GABAPENTIN 300 MG/1
300 CAPSULE ORAL
Qty: 90 CAPSULE | Refills: 1 | Status: SHIPPED | OUTPATIENT
Start: 2021-11-11 | End: 2022-05-13 | Stop reason: SDUPTHER

## 2021-11-24 ENCOUNTER — TELEPHONE (OUTPATIENT)
Dept: INTERNAL MEDICINE CLINIC | Age: 76
End: 2021-11-24

## 2022-01-03 ENCOUNTER — TELEMEDICINE (OUTPATIENT)
Dept: INTERNAL MEDICINE CLINIC | Age: 77
End: 2022-01-03
Payer: MEDICARE

## 2022-01-03 VITALS — HEIGHT: 64 IN | WEIGHT: 177 LBS | TEMPERATURE: 97.4 F | BODY MASS INDEX: 30.22 KG/M2

## 2022-01-03 DIAGNOSIS — B34.9 ACUTE VIRAL SYNDROME: Primary | ICD-10-CM

## 2022-01-03 PROCEDURE — U0005 INFEC AGEN DETEC AMPLI PROBE: HCPCS | Performed by: INTERNAL MEDICINE

## 2022-01-03 PROCEDURE — 99213 OFFICE O/P EST LOW 20 MIN: CPT | Performed by: INTERNAL MEDICINE

## 2022-01-03 PROCEDURE — U0003 INFECTIOUS AGENT DETECTION BY NUCLEIC ACID (DNA OR RNA); SEVERE ACUTE RESPIRATORY SYNDROME CORONAVIRUS 2 (SARS-COV-2) (CORONAVIRUS DISEASE [COVID-19]), AMPLIFIED PROBE TECHNIQUE, MAKING USE OF HIGH THROUGHPUT TECHNOLOGIES AS DESCRIBED BY CMS-2020-01-R: HCPCS | Performed by: INTERNAL MEDICINE

## 2022-01-03 NOTE — PROGRESS NOTES
Virtual Regular Visit    Verification of patient location:    Patient is located in the following state in which I hold an active license [place]      Assessment/Plan:    Acute viral syndrome  - We will order a COVID 19 test for pt and manage pt symptomatically  - she has been counseled to keep very well hydrated, to use OTC tylenol for her aches and pains and warm salt water gargles for any sore throat , Flonase for rhinitis and congestion, mucinex for any productive cough and Robitussin for any dry cough  - she was also counseled to self quarantine till her test results are available and to take a daily multivitamin  - she should wash her hands liberally with soap and water for at least 20 seconds at a time and wear a face mask when in the presence of any other person  - we will give pt an excuse note for work pending negative COVID 19 results if needed and 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 for the COVID-19 infection  - if symptoms persist or worsen pt should call the office or go to the ED especially if she develops worsening SOB, chest pain, fever or an inability to tolerate oral intake  - follow-up as needed and especially if COVID test is positive      Problem List Items Addressed This Visit        Other    Acute viral syndrome - Primary    Relevant Orders    COVID Only- Collected at [facility]    BMI 30 0-30 9,adult          BMI Counseling: Body mass index is 30 38 kg/m²  The BMI is above normal  Nutrition recommendations include limiting drinks that contain sugar and reducing intake of cholesterol  Exercise recommendations include exercising 3-5 times per week  No pharmacotherapy was ordered  Patient referred to PCP  Rationale for BMI follow-up plan is due to patient being overweight or obese           Reason for visit is   Chief Complaint   Patient presents with    Virtual Regular Visit     text--- [phone]----OGJPEG she had covid wants to ask some questions  On 12/30/2021 started with symptoms of headach, sore throat, fever 100 3, and congestion NO LONGER has these symptoms   Still feels congestion    Virtual Regular Visit        Encounter provider Faviola Mata DO    Provider located at [address] [place] [id number]      Recent Visits  No visits were found meeting these conditions  Showing recent visits within past 7 days and meeting all other requirements  Today's Visits  Date Type Provider Dept   01/03/22 Telemedicine Faviola Mata DO [facility]   Showing today's visits and meeting all other requirements  Future Appointments  No visits were found meeting these conditions  Showing future appointments within next 150 days and meeting all other requirements       The patient was identified by name and date of birth  Carlee Rush was informed that this is a telemedicine visit and that the visit is being conducted through [facility] and patient was informed this is a secure, HIPAA-complaint platform  She agrees to proceed     My office door was closed  No one else was in the room  She acknowledged consent and understanding of privacy and security of the video platform  The patient has agreed to participate and understands they can discontinue the visit at any time  Patient is aware this is a billable service  Subjective  Carlee Rush is a 78 y o  female    HPI   Pt presents via telemedicine visit with complaints of fever with a T-max of 100 3° that started 5 days ago, chills, diaphoresis, nasal congestion, sinus pain  and pressure, sore throat, cough that is occasionally productive of yellow colored phlegm, abdominal pain for just 1 day and headache  She states that her symptoms have started improving    She denies earache, chest pain, shortness of breath, wheezing, palpitations, nausea, vomiting, diarrhea, constipation, myalgias, arthralgias, change in sense of taste or smell  She took a COVID-19 home test and it was positive  Of note, she was vaccinated for the covid and the flu shot and even got her booster shot  Symptoms are much improved now but pt was  Supposed to go with her family to go to [facility] and had to cancel and would like a doctor's note to prove to the [employer] and resort that she cancelled for a legitimate reason  symptoms started 12/30/21  Tested by home tested 12/31/21  Takes multivitamins  She no longer has fever, nasal congestion, sore throat or headache, abd pain     Last took analgesic 2 days ago  decongestant made her goofy so she stopped taking it  Cough is mild and nagging and is improving  Pt does not know if she was exposed to anyone with the COVID 19 infection but she was at a [employer] party over the [place] and admits that her daughter also has [employer] sumptoms        Past Medical History:   Diagnosis Date    Hyperlipidemia     Skin tag     last assessed 4/21/17, resolved 10/2/17        Past Surgical History:   Procedure Laterality Date    COLONOSCOPY      KNEE ARTHROSCOPY Left     KNEE ARTHROSCOPY W/ MENISCAL REPAIR Left 2000    [place] TOTAL KNEE ARTHROPLASTY Left 10/24/2019    Procedure: ARTHROPLASTY KNEE TOTAL;  Surgeon: Helen Smith MD;  [facility];  Service: Orthopedics    TONSILLECTOMY AND ADENOIDECTOMY         Current Outpatient Medications   Medication Sig Dispense Refill    ALPRAZolam (XANAX) 0 5 mg tablet Take 1 tablet (0 5 mg total) by mouth daily as needed (prn) 30 tablet 1    B Complex Vitamins (B COMPLEX PO) Take 1 capsule by mouth daily       CALCIUM PO Take 1,200 mg by mouth daily       Cholecalciferol (VITAMIN D3 PO) Take 1 capsule by mouth daily       gabapentin (NEURONTIN) 300 mg capsule Take 1 capsule (300 mg total) by mouth daily at bedtime 90 capsule 1    pantoprazole (PROTONIX) 40 mg tablet Take 1 tablet (40 mg total) by mouth daily before breakfast (Patient taking differently: Take 40 mg by mouth daily as needed ) 30 tablet 5    sertraline (ZOLOFT) 100 mg tablet Take 1 tablet (100 mg total) by mouth daily at bedtime 90 tablet 1    simvastatin (ZOCOR) 20 mg tablet Take 1 tablet (20 mg total) by mouth daily at bedtime 90 tablet 1     No current facility-administered medications for this visit  Allergies   Allergen Reactions    Tetracyclines & Related      Blisters, yeast infection       Review of Systems   Constitutional: Positive for chills, diaphoresis and fever (t max of 100 3,  fever broke on Friday)  Negative for activity change, appetite change, fatigue and unexpected weight change  HENT: Positive for congestion (no change in sense of taste or smell), sinus pressure, sinus pain and sore throat  Negative for ear pain, postnasal drip and rhinorrhea  Took a home test on 12/31/21 and it was positive  Symptoms started on 12/30/21   Eyes: Negative for pain  Respiratory: Positive for cough (productive of yellowish phlegm)  Negative for choking, chest tightness, shortness of breath and wheezing  Cardiovascular: Negative for chest pain, palpitations and leg swelling  Gastrointestinal: Positive for abdominal pain (for one day)  Negative for constipation, diarrhea, nausea and vomiting  Genitourinary: Negative for dysuria and hematuria  Musculoskeletal: Negative for arthralgias, back pain, gait problem, joint swelling, myalgias and neck stiffness  Skin: Negative for pallor and rash  Neurological: Positive for headaches (mild)  Negative for dizziness, tremors, seizures, syncope and light-headedness  Hematological: Negative for adenopathy  Psychiatric/Behavioral: Negative for behavioral problems         Video Exam    Vitals:    01/03/22 0905   Temp: (!) 97 4 °F (36 3 °C)   TempSrc: Oral   Weight: 80 3 kg (177 lb)   Height: 5' 4" (1 626 m)       Physical Exam  Constitutional:       General: She is not in acute distress  Appearance: Normal appearance  She is not ill-appearing or toxic-appearing  HENT:      Head: Normocephalic and atraumatic  Mouth/Throat:      Mouth: Mucous membranes are moist    Eyes:      General: No scleral icterus  Right eye: No discharge  Left eye: No discharge  Pulmonary:      Effort: Pulmonary effort is normal  No respiratory distress (no conversational dypsnea)  Abdominal:      Tenderness: There is no abdominal tenderness  Musculoskeletal:      Cervical back: Normal range of motion  Skin:     Coloration: Skin is not jaundiced  Neurological:      Mental Status: She is alert and oriented to person, place, and time  Psychiatric:         Behavior: Behavior normal           I spent 16 minutes directly with the patient during this visit    Zoltan Mckee verbally agrees to participate in [facility]  Pt is aware that [facility] could be limited without vital signs or the ability to perform a full hands-on physical exam  Jaja Griffith understands she or the provider may request at any time to terminate the video visit and request the patient to seek care or treatment in person

## 2022-01-03 NOTE — PATIENT INSTRUCTIONS
WHILE BEINGTESTED FOR COVID-19 INFECTION:  -PLEASE SELF QUARANTINE  -PLEASE [address] YOUR HANDS WITH SOAP AND WATER, LIBERALLY, FOR AT LEAST 20 SECONDS AT A TIME  -WEAR A FACE MASK WHEN IN THE PRESENCE OF ANY OTHER PERSON  -PLEASE TAKE TYLENOL FOR ANY ACHES AND PAINS OF FEVER  -CALL THE OFFICE OR GO TO THE EMERGENCY DEPARTMENT IF YOU SHOULD DEVELOP WORSENING CHEST PAIN, FEVER, SHORTNESS OF BREATH, COUGH OR AN INABILITY TO TOLERATE ORAL INTAKE  -PLEASE CALL THE OFFICE IF YOU DO NOT HEAR FROM US REGARDING YOUR TEST RESULTS IN THE NEXT 50 HOURS

## 2022-01-10 ENCOUNTER — TELEMEDICINE (OUTPATIENT)
Dept: INTERNAL MEDICINE CLINIC | Age: 77
End: 2022-01-10
Payer: MEDICARE

## 2022-01-10 VITALS — BODY MASS INDEX: 30.22 KG/M2 | WEIGHT: 177 LBS | HEIGHT: 64 IN | TEMPERATURE: 97.2 F

## 2022-01-10 DIAGNOSIS — B34.9 ACUTE VIRAL SYNDROME: ICD-10-CM

## 2022-01-10 DIAGNOSIS — U07.1 COVID-19 VIRUS INFECTION: Primary | ICD-10-CM

## 2022-01-10 PROCEDURE — 99213 OFFICE O/P EST LOW 20 MIN: CPT | Performed by: INTERNAL MEDICINE

## 2022-01-10 NOTE — PROGRESS NOTES
COVID-19 Outpatient Progress Note    Assessment/Plan:      Acute viral syndrome with COVID 19 infection  - symptoms are completely resolved  - patient developed her symptoms over 10 days ago, she has improved greatly and has been off analgesics for over 24 hours  -per [address] guidelines, she is cleared to discontinue isolation and may return to work  - we will give her a doctors note as requested   - she was counseled to continue with his multivitamins  -she should call the office if she develops any more symptoms      Problem List Items Addressed This Visit        Other    Acute viral syndrome    COVID-19 virus infection - Primary         Disposition:     Patient has COVID-19 infection  Based off CDC guidelines, they were recommended to isolate for 5 days from the date of the positive test  If they remain asymptomatic, isolation may be ended followed by 5 days of wearing a mask when around othes to minimize risk of infecting others  If they have a fever, continue to stay home until fever resolves for at least 24 hours  I recommended continued isolation until at least 24 hours have passed since recovery defined as resolution of fever without the use of fever-reducing medications AND improvement in COVID symptoms AND 10 days have passed since onset of symptoms (or 10 days have passed since date of first positive viral diagnostic test for asymptomatic patients)  I have spent 15 minutes directly with the patient  Greater than 50% of this time was spent in counseling/coordination of care regarding: prognosis, patient and family education and impressions        Encounter provider Amanda Carlos DO    Provider located at [address] [id number]    Recent Visits  Date Type Provider Dept   01/03/22 [facility], [facility]   Showing recent visits within past 7 days and meeting all other requirements  Today's Visits  Date Type Provider Dept   01/10/22 Telemedicine [facility]   Showing today's visits and meeting all other requirements  Future Appointments  No visits were found meeting these conditions  Showing future appointments within next 150 days and meeting all other requirements     This virtual check-in was done via [employer] and patient was informed that this is not a secure, HIPAA-compliant platform  She agrees to proceed  Patient agrees to participate in a virtual check in via telephone or video visit instead of presenting to the office to address urgent/immediate medical needs  Patient is aware this is a billable service  After connecting through [facility], the patient was identified by name and date of birth  Ghada Jamison was informed that this was a telemedicine visit and that the exam was being conducted confidentially over secure lines  My office door was closed  No one else was in the room  Ghada Jamison acknowledged consent and understanding of privacy and security of the telemedicine visit  I informed the patient that I have reviewed her record in Epic and presented the opportunity for her to ask any questions regarding the visit today  The patient agreed to participate  Verification of patient location:  Patient is located in the following state in which I hold an active license: [place]    Subjective:   Ghada Jamison is a 78 y o  female who has been screened for COVID-19  Symptom change since last report: resolving  Patient denies fever, chills, fatigue, rhinorrhea, sore throat, cough, shortness of breath, chest tightness, abdominal pain, nausea, vomiting, diarrhea, myalgias and headaches  - Date of symptom onset: 12/30/2021  - Date of positive COVID-19 PCR: 1/3/2022   Type of test: PCR    COVID-19 vaccination status: Fully vaccinated with booster    Liang Platt has been staying home and has isolated themselves in her home  She is taking care to not share personal items and is cleaning all surfaces that are touched often, like counters, tabletops, and doorknobs using household cleaning sprays or wipes  She is wearing a mask when she leaves her room  Pt presents for a follow up visit regarding her COVID 19 infection  She states that her symptoms are completely resolved  She needs a letter to the air line because she was supposed to travel to [facility] with her family but fell ill and needs a doctors note so that her money can be refunded       Lab Results   Component Value Date    SARSCOV2 Positive (A) 01/03/2022    SARSCOV2 Not Detected 10/03/2020     Past Medical History:   Diagnosis Date    Hyperlipidemia     Skin tag     last assessed 4/21/17, resolved 10/2/17      Past Surgical History:   Procedure Laterality Date    COLONOSCOPY      KNEE ARTHROSCOPY Left     KNEE ARTHROSCOPY W/ MENISCAL REPAIR Left 2000    [place] TOTAL KNEE ARTHROPLASTY Left 10/24/2019    Procedure: ARTHROPLASTY KNEE TOTAL;  Surgeon: Helen Smith MD;  Location: [facility];  Service: Orthopedics    TONSILLECTOMY AND ADENOIDECTOMY       Current Outpatient Medications   Medication Sig Dispense Refill    ALPRAZolam (XANAX) 0 5 mg tablet Take 1 tablet (0 5 mg total) by mouth daily as needed (prn) 30 tablet 1    B Complex Vitamins (B COMPLEX PO) Take 1 capsule by mouth daily       CALCIUM PO Take 1,200 mg by mouth daily       Cholecalciferol (VITAMIN D3 PO) Take 1 capsule by mouth daily       gabapentin (NEURONTIN) 300 mg capsule Take 1 capsule (300 mg total) by mouth daily at bedtime 90 capsule 1    pantoprazole (PROTONIX) 40 mg tablet Take 1 tablet (40 mg total) by mouth daily before breakfast (Patient taking differently: Take 40 mg by mouth daily as needed ) 30 tablet 5    sertraline (ZOLOFT) 100 mg tablet Take 1 tablet (100 mg total) by mouth daily at bedtime 90 tablet 1    simvastatin (ZOCOR) 20 mg tablet Take 1 tablet (20 mg total) by mouth daily at bedtime 90 tablet 1     No current facility-administered medications for this visit  Allergies   Allergen Reactions    Tetracyclines & Related      Blisters, yeast infection       Review of Systems   Constitutional: Negative for activity change, chills, fatigue, fever and unexpected weight change  HENT: Negative for ear pain, postnasal drip, rhinorrhea, sinus pressure and sore throat  Eyes: Negative for pain  Respiratory: Negative for cough, choking, chest tightness, shortness of breath and wheezing  Cardiovascular: Negative for chest pain, palpitations and leg swelling  Gastrointestinal: Negative for abdominal pain, constipation, diarrhea, nausea and vomiting  Genitourinary: Negative for dysuria and hematuria  Musculoskeletal: Negative for arthralgias, back pain, gait problem, joint swelling, myalgias and neck stiffness  Skin: Negative for pallor and rash  Neurological: Negative for dizziness, tremors, seizures, syncope, light-headedness and headaches  Hematological: Negative for adenopathy  Psychiatric/Behavioral: Negative for behavioral problems  Objective:    Vitals:    01/10/22 1408   Temp: (!) 97 2 °F (36 2 °C)   Weight: 80 3 kg (177 lb)   Height: 5' 4" (1 626 m)       Physical Exam  Constitutional:       General: She is not in acute distress  Appearance: Normal appearance  She is not ill-appearing, toxic-appearing or diaphoretic  HENT:      Head: Normocephalic and atraumatic  Eyes:      General: No scleral icterus  Right eye: No discharge  Left eye: No discharge  Pulmonary:      Effort: No respiratory distress  Abdominal:      Tenderness: There is no abdominal tenderness  Neurological:      Mental Status: She is alert and oriented to person, place, and time     Psychiatric:         Behavior: Behavior normal          VIRTUAL VISIT DISCLAIMER    Jaja Griffith verbally agrees to participate in Virtual Care Services  Pt is aware that [employer] could be limited without vital signs or the ability to perform a full hands-on physical exam  Jaja Griffith understands she or the provider may request at any time to terminate the video visit and request the patient to seek care or treatment in person

## 2022-01-10 NOTE — LETTER
January 10th, 2022                                         TO WHOM IT MAY CONCERN      The bearer of this letter, Yuly Palma, date of birth, 1945 is a patient of this office and was seen in our office on 01/03/2022 and 01/10/2022  She developed symptoms on 12/30/21 and was diagnosed with COVID 19 infection on 01/03/2022  Please assist her in anyway you can  If I can be of further assistance please do not hesitate to contact me at the above number and address            Sincerely,            Ani White MD, DO

## 2022-01-20 ENCOUNTER — PROCEDURE VISIT (OUTPATIENT)
Dept: INTERNAL MEDICINE CLINIC | Age: 77
End: 2022-01-20
Payer: MEDICARE

## 2022-01-20 VITALS
WEIGHT: 142 LBS | HEART RATE: 87 BPM | SYSTOLIC BLOOD PRESSURE: 98 MMHG | BODY MASS INDEX: 24.24 KG/M2 | DIASTOLIC BLOOD PRESSURE: 64 MMHG | TEMPERATURE: 98.2 F | OXYGEN SATURATION: 98 % | HEIGHT: 64 IN

## 2022-01-20 DIAGNOSIS — L82.0 SEBORRHEIC KERATOSES, INFLAMED: ICD-10-CM

## 2022-01-20 DIAGNOSIS — L98.9 SKIN LESION OF CHEEK: Primary | ICD-10-CM

## 2022-01-20 PROCEDURE — 88305 TISSUE EXAM BY PATHOLOGIST: CPT | Performed by: PATHOLOGY

## 2022-01-20 PROCEDURE — 11310 SHAVE SKIN LESION 0.5 CM/<: CPT | Performed by: FAMILY MEDICINE

## 2022-01-20 NOTE — PROGRESS NOTES
Shave lesion    Date/Time: 1/20/2022 6:15 PM  Performed by: Gaynel Snellen, DO  Authorized by: Gaynel Snellen, DO   Universal Protocol:  Consent: Written consent obtained    Consent given by: patient  Patient understanding: patient states understanding of the procedure being performed      Number of Lesions: 1  Lesion 1:     Body area: head/neck    Head/neck location: L cheek    Initial size (mm): 4    Final defect size (mm): 5    Malignancy: malignancy unknown      Destruction method: shave removal      Comments:  Lesion sent for pathology, post care instructions given to patient

## 2022-01-25 DIAGNOSIS — F41.9 ANXIETY: ICD-10-CM

## 2022-01-25 RX ORDER — ALPRAZOLAM 0.5 MG/1
0.5 TABLET ORAL DAILY PRN
Qty: 30 TABLET | Refills: 1 | Status: SHIPPED | OUTPATIENT
Start: 2022-01-25 | End: 2022-05-31 | Stop reason: SDUPTHER

## 2022-01-31 ENCOUNTER — PROCEDURE VISIT (OUTPATIENT)
Dept: INTERNAL MEDICINE CLINIC | Age: 77
End: 2022-01-31
Payer: MEDICARE

## 2022-01-31 VITALS
SYSTOLIC BLOOD PRESSURE: 108 MMHG | DIASTOLIC BLOOD PRESSURE: 80 MMHG | WEIGHT: 169.6 LBS | TEMPERATURE: 98.4 F | HEART RATE: 94 BPM | HEIGHT: 64 IN | BODY MASS INDEX: 28.95 KG/M2 | OXYGEN SATURATION: 97 %

## 2022-01-31 DIAGNOSIS — L82.0 SEBORRHEIC KERATOSES, INFLAMED: ICD-10-CM

## 2022-01-31 DIAGNOSIS — L81.9 ATYPICAL PIGMENTED SKIN LESION: Primary | ICD-10-CM

## 2022-01-31 PROCEDURE — 88305 TISSUE EXAM BY PATHOLOGIST: CPT | Performed by: PATHOLOGY

## 2022-01-31 PROCEDURE — 11302 SHAVE SKIN LESION 1.1-2.0 CM: CPT | Performed by: FAMILY MEDICINE

## 2022-01-31 NOTE — PROGRESS NOTES
Shave lesion    Date/Time: 1/31/2022 5:33 PM  Performed by: Enrique Spann DO  Authorized by: Enrique Spann DO   Universal Protocol:  Consent: Written consent obtained  Consent given by: patient  Patient understanding: patient states understanding of the procedure being performed  Patient identity confirmed: verbally with patient      Number of Lesions: 2  Lesion 1:     Body area: lower extremity    Lower extremity location: L hip (thigh area)    Initial size (mm): 8    Final defect size (mm): 11    Malignancy: malignancy unknown      Destruction method: shave removal    Lesion 2:     Body area: trunk    Trunk location: abdomen    Initial size (mm): 8    Final defect size (mm): 11    Malignancy: malignancy unknown      Destruction method: shave removal      Comments: Both lesions sent for pathology  Post care instructions given to patient    Resemble SK hyper pigmented

## 2022-03-03 PROBLEM — L82.0 SEBORRHEIC KERATOSES, INFLAMED: Status: ACTIVE | Noted: 2022-03-03

## 2022-03-10 DIAGNOSIS — F41.9 ANXIETY: ICD-10-CM

## 2022-03-10 DIAGNOSIS — E78.5 HYPERLIPIDEMIA, UNSPECIFIED HYPERLIPIDEMIA TYPE: ICD-10-CM

## 2022-03-10 RX ORDER — SERTRALINE HYDROCHLORIDE 100 MG/1
100 TABLET, FILM COATED ORAL
Qty: 90 TABLET | Refills: 1 | Status: SHIPPED | OUTPATIENT
Start: 2022-03-10

## 2022-03-10 RX ORDER — SIMVASTATIN 20 MG
20 TABLET ORAL
Qty: 90 TABLET | Refills: 1 | Status: SHIPPED | OUTPATIENT
Start: 2022-03-10

## 2022-05-13 DIAGNOSIS — G56.03 BILATERAL CARPAL TUNNEL SYNDROME: ICD-10-CM

## 2022-05-13 RX ORDER — GABAPENTIN 300 MG/1
300 CAPSULE ORAL
Qty: 90 CAPSULE | Refills: 1 | Status: SHIPPED | OUTPATIENT
Start: 2022-05-13

## 2022-05-24 ENCOUNTER — RA CDI HCC (OUTPATIENT)
Dept: OTHER | Facility: HOSPITAL | Age: 77
End: 2022-05-24

## 2022-05-24 NOTE — PROGRESS NOTES
F32 0 major depressive disorder, single episode, mild  OR   F32 1 major depressive disorder, single episode, moderate  OR   F32 2 major depressive disorder, single episode, severe without psychotic features OR   F32 4 major depressive disorder, single episode, in partial remission OR   F32 5 major depressive disorder, single episode, in full remission  -  F33 0 major depressive disorder, recurrent, mild  OR   F33 1 major depressive disorder, recurrent, moderate  OR   F33 2 major depressive disorder, recurrent, severe without psychotic features OR   F33 41 major depressive disorder, recurrent, in partial remission OR   F33 42 major depressive disorder, recurrent, in full remission  [facility]  coding opportunities          Chart Reviewed number of suggestions sent to Provider: 1     Patients Insurance     Medicare Insurance: Medicare

## 2022-05-31 ENCOUNTER — OFFICE VISIT (OUTPATIENT)
Dept: INTERNAL MEDICINE CLINIC | Age: 77
End: 2022-05-31
Payer: MEDICARE

## 2022-05-31 VITALS
SYSTOLIC BLOOD PRESSURE: 122 MMHG | HEART RATE: 84 BPM | TEMPERATURE: 97.7 F | DIASTOLIC BLOOD PRESSURE: 64 MMHG | WEIGHT: 167 LBS | HEIGHT: 64 IN | BODY MASS INDEX: 28.51 KG/M2 | OXYGEN SATURATION: 95 %

## 2022-05-31 DIAGNOSIS — K21.9 GASTROESOPHAGEAL REFLUX DISEASE WITHOUT ESOPHAGITIS: ICD-10-CM

## 2022-05-31 DIAGNOSIS — M85.89 OSTEOPENIA OF MULTIPLE SITES: ICD-10-CM

## 2022-05-31 DIAGNOSIS — Z12.31 ENCOUNTER FOR SCREENING MAMMOGRAM FOR MALIGNANT NEOPLASM OF BREAST: Primary | ICD-10-CM

## 2022-05-31 DIAGNOSIS — L71.9 ROSACEA: ICD-10-CM

## 2022-05-31 DIAGNOSIS — F41.9 ANXIETY: ICD-10-CM

## 2022-05-31 DIAGNOSIS — E78.00 HYPERCHOLESTEROLEMIA: ICD-10-CM

## 2022-05-31 PROBLEM — B34.9 ACUTE VIRAL SYNDROME: Status: RESOLVED | Noted: 2022-01-03 | Resolved: 2022-05-31

## 2022-05-31 PROCEDURE — 99214 OFFICE O/P EST MOD 30 MIN: CPT | Performed by: FAMILY MEDICINE

## 2022-05-31 RX ORDER — PANTOPRAZOLE SODIUM 40 MG/1
40 TABLET, DELAYED RELEASE ORAL DAILY PRN
Qty: 90 TABLET | Refills: 1 | Status: SHIPPED | OUTPATIENT
Start: 2022-05-31

## 2022-05-31 RX ORDER — ALPRAZOLAM 0.5 MG/1
0.5 TABLET ORAL DAILY PRN
Qty: 30 TABLET | Refills: 1 | Status: SHIPPED | OUTPATIENT
Start: 2022-05-31

## 2022-05-31 NOTE — PROGRESS NOTES
Assessment/Plan:    1  Encounter for screening mammogram for malignant neoplasm of breast  -     Mammo screening bilateral w 3d & cad; Future; Expected date: 09/29/2022    2  Anxiety  -     ALPRAZolam (XANAX) 0 5 mg tablet; Take 1 tablet (0 5 mg total) by mouth daily as needed (prn)    3  Gastroesophageal reflux disease without esophagitis  -     pantoprazole (PROTONIX) 40 mg tablet; Take 1 tablet (40 mg total) by mouth daily as needed (prn)    4  Osteopenia of multiple sites    5  Rosacea    6  Hypercholesterolemia            There are no Patient Instructions on file for this visit  Return in about 6 months (around 11/30/2022) for Recheck  Subjective:      Patient ID: Pato Youssef is a 78 y o  female  Chief Complaint   Patient presents with    Follow-up     6 month fu - anxiety-  pt did not go for labs        HPI  Patient did not get any lab work done  She states she will go tomorrow     Bilateral wrist paresthesias with radiation into the lateral 2 fingers  Not dropping objects or losing  feeling weak however feels that this is different than before  May 2022, taking gabapentin and symptoms have completely resolved  She is back to her normal activities of daily living and playing the flute for charge        Hyperlipidemia (Follow-Up): The patient states her hyperlipidemia has been under good control since the last visit  She has no comorbid illnesses  Symptoms: denies chest pain,-denies intermittent leg claudication,-denies muscle pain-and-denies muscle weakness  Associated symptoms include no focal neurologic deficits-and-no memory loss  Medications: the patient is adherent with her medication regimen -the patient complains of medication side effects  The patient is doing well with her hyperlipidemia goals  the patient's LDL goal is <100 mg/dL  -the patient's last LDL was 72 mg/dL  -(5/2014)   February 2019, due for labs and has lab slip  August 2019, very well controlled  March 2020:  Not able to get labs done since they are closed right now however compliant with the medication and does not need refills  November 2021, had lab work done in September  Nicely controlled levels  No issueson simvastatin 20 mg daily and levels are well controlled   No side effects         Gastroesophageal Reflux Disease (Follow-Up): The patient is being seen for follow-up of gastroesophageal reflux disease   Is essentially resolved symptoms   Not on anything on a regular basis  March 2020, well controlled November 2021, controlled  Not on daily PPI     Depression (Follow-Up): The patient states her depression has been stable since the last visit  She has no comorbid illnesses  Interval Events: doing well but  recently dx with lung cancer after brain surgery and colonoscopy  She has had no significant interval events  Interval Symptoms: improved depression,-improved depressed mood,-improved loss of interest or pleasure in activities,-stable insomnia,-denies feelings of worthlessness,-denies feelings of guilt,-denies trouble concentrating,-improved anxiety-and-denies sexual dysfunction   February 2019,  passed away in the winter 2018 but she has a very large support system and is doing relatively well   Taking it day by day  Associated symptoms include: no recent weight gain-and-no thoughts of suicide  Social Support: the patient has good social support     Medications: the patient is adherent with her medication regimen -She denies medication side effects   Pt has been on zoloft >5 years  Sharion Copper she feels really stable on it and does not feel the need to go off of it August 2019, doing very well and does not require any medication changes  March 2020, doing very well with present dose Zoloft with no breakthrough symptoms   No suicidal or homicidal ideations   April 2021, no HI or SI and tolerating medication well   Symptoms are well controlled with no flare ups   Does not need a change in dose    November 2021, no HI or SI  Doing well with Zoloft    No breakthrough symptoms         Osteopenia of multiple sites   Takes vitamin-D and calcium on a regular basis and is very active   Up-to-date on bone density study      Dyspnea on exertion, completely resolved and feels that it may have been anxiety related   Had negative stress test echocardiogram chest x-ray and no exacerbation of symptoms              The following portions of the patient's history were reviewed and updated as appropriate: allergies, current medications, past family history, past medical history, past social history, past surgical history and problem list     Review of Systems      Constitutional:  Denies fever or chills   Eyes:  Denies double , blurry vision or eye pain  HENT:  Denies nasal congestion or sore throat   Respiratory:  Denies cough or shortness of breath or wheezing  Cardiovascular:  Denies palpitations or chest pain  GI:  Denies abdominal pain, nausea, or vomiting, no loose stools, no reflux  Integument:  Denies rash , no open areas  Neurologic:  Denies headache or focal weakness, no dizziness  : no dysuria, or hematuria        Current Outpatient Medications   Medication Sig Dispense Refill    ALPRAZolam (XANAX) 0 5 mg tablet Take 1 tablet (0 5 mg total) by mouth daily as needed (prn) 30 tablet 1    B Complex Vitamins (B COMPLEX PO) Take 1 capsule by mouth daily       CALCIUM PO Take 1,200 mg by mouth daily       Cholecalciferol (VITAMIN D3 PO) Take 1 capsule by mouth daily       gabapentin (NEURONTIN) 300 mg capsule Take 1 capsule (300 mg total) by mouth daily at bedtime 90 capsule 1    pantoprazole (PROTONIX) 40 mg tablet Take 1 tablet (40 mg total) by mouth daily as needed (prn) 90 tablet 1    sertraline (ZOLOFT) 100 mg tablet Take 1 tablet (100 mg total) by mouth daily at bedtime 90 tablet 1    simvastatin (ZOCOR) 20 mg tablet Take 1 tablet (20 mg total) by mouth daily at bedtime 90 tablet 1     No current facility-administered medications for this visit         Objective:    /64 (BP Location: Left arm, Patient Position: Sitting, Cuff Size: Standard)   Pulse 84   Temp 97 7 °F (36 5 °C) (Temporal)   Ht 5' 4" (1 626 m)   Wt 75 8 kg (167 lb)   SpO2 95%   BMI 28 67 kg/m²        Physical Exam      Constitutional:  Well developed, well nourished, no acute distress, non-toxic appearance   Eyes:  PERRL, conjunctiva normal , non icteric sclera  HENT:  Atraumatic, oropharynx moist  Neck-  supple   Respiratory:  CTA b/l, normal breath sounds, no rales, no wheezing   Cardiovascular:  RRR, no murmurs, no LE edema b/l  GI:  Soft, nondistended, normal bowel sounds x 4, nontender, no organomegaly, no mass, no rebound, no guarding   Neurologic:  no focal deficits noted   Psychiatric:  Speech and behavior appropriate , AAO x 3           Kristen Rollins, DO

## 2022-06-01 ENCOUNTER — APPOINTMENT (OUTPATIENT)
Dept: LAB | Age: 77
End: 2022-06-01
Payer: MEDICARE

## 2022-06-01 DIAGNOSIS — E78.00 HYPERCHOLESTEROLEMIA: ICD-10-CM

## 2022-06-01 LAB
ALBUMIN SERPL BCP-MCNC: 3.6 G/DL (ref 3.5–5)
ALP SERPL-CCNC: 65 U/L (ref 46–116)
ALT SERPL W P-5'-P-CCNC: 32 U/L (ref 12–78)
ANION GAP SERPL CALCULATED.3IONS-SCNC: 4 MMOL/L (ref 4–13)
AST SERPL W P-5'-P-CCNC: 20 U/L (ref 5–45)
BILIRUB SERPL-MCNC: 0.54 MG/DL (ref 0.2–1)
BUN SERPL-MCNC: 24 MG/DL (ref 5–25)
CALCIUM SERPL-MCNC: 9.7 MG/DL (ref 8.3–10.1)
CHLORIDE SERPL-SCNC: 109 MMOL/L (ref 100–108)
CHOLEST SERPL-MCNC: 218 MG/DL
CO2 SERPL-SCNC: 26 MMOL/L (ref 21–32)
CREAT SERPL-MCNC: 0.65 MG/DL (ref 0.6–1.3)
GFR SERPL CREATININE-BSD FRML MDRD: 87 ML/MIN/1.73SQ M
GLUCOSE P FAST SERPL-MCNC: 109 MG/DL (ref 65–99)
HDLC SERPL-MCNC: 72 MG/DL
LDLC SERPL CALC-MCNC: 121 MG/DL (ref 0–100)
NONHDLC SERPL-MCNC: 146 MG/DL
POTASSIUM SERPL-SCNC: 4.4 MMOL/L (ref 3.5–5.3)
PROT SERPL-MCNC: 7.1 G/DL (ref 6.4–8.2)
SODIUM SERPL-SCNC: 139 MMOL/L (ref 136–145)
TRIGL SERPL-MCNC: 127 MG/DL

## 2022-06-01 PROCEDURE — 36415 COLL VENOUS BLD VENIPUNCTURE: CPT

## 2022-06-01 PROCEDURE — 80053 COMPREHEN METABOLIC PANEL: CPT

## 2022-06-01 PROCEDURE — 80061 LIPID PANEL: CPT

## 2022-08-26 ENCOUNTER — TELEMEDICINE (OUTPATIENT)
Dept: INTERNAL MEDICINE CLINIC | Facility: OTHER | Age: 77
End: 2022-08-26
Payer: MEDICARE

## 2022-08-26 VITALS — BODY MASS INDEX: 28.51 KG/M2 | HEIGHT: 64 IN | WEIGHT: 167 LBS

## 2022-08-26 DIAGNOSIS — U07.1 COVID-19 VIRUS INFECTION: Primary | ICD-10-CM

## 2022-08-26 PROCEDURE — 99213 OFFICE O/P EST LOW 20 MIN: CPT | Performed by: PHYSICIAN ASSISTANT

## 2022-08-26 NOTE — PATIENT INSTRUCTIONS
COVID-19 virus infection  Patient COVID positive by home test   Discussed Tx options including Paxlovid  Patient meets criteria but is not interested in any medications at this time because she does not current "feel sick"  Vaccinated and boosted and had prior COVID infection <1 year ago  Discussed risks associated with supportive management  Start Vit C 500 mg twice daily, start daily multivitamin containing zinc, continue Vit D3 2000 U daily  Outlined supportive care, rest, fluids, hydration  Discussed red flag sx which need immediate eval and tx should they develop  Discussed self isolation for 5 days after which she can return to activity but should wear a high quality mask until day 10  Recommended against plane travel as she will be still within 5 day self isolation window  Patient verbalized understanding and is willing  Will have office staff contact her for Chris virtual visit follow up in 3 days

## 2022-08-26 NOTE — ASSESSMENT & PLAN NOTE
Patient COVID positive by home test   Discussed Tx options including Paxlovid  Patient meets criteria but is not interested in any medications at this time because she does not current "feel sick"  Vaccinated and boosted and had prior COVID infection <1 year ago  Discussed risks associated with supportive management  Start Vit C 500 mg twice daily, start daily multivitamin containing zinc, continue Vit D3 2000 U daily  Outlined supportive care, rest, fluids, hydration  Discussed red flag sx which need immediate eval and tx should they develop  Discussed self isolation for 5 days after which she can return to activity but should wear a high quality mask until day 10  Recommended against plane travel as she will be still within 5 day self isolation window  Patient verbalized understanding and is willing  Will have office staff contact her for Chris virtual visit follow up in 3 days

## 2022-09-08 DIAGNOSIS — F41.9 ANXIETY: ICD-10-CM

## 2022-09-08 RX ORDER — SERTRALINE HYDROCHLORIDE 100 MG/1
100 TABLET, FILM COATED ORAL
Qty: 90 TABLET | Refills: 1 | Status: SHIPPED | OUTPATIENT
Start: 2022-09-08

## 2022-09-08 RX ORDER — SERTRALINE HYDROCHLORIDE 100 MG/1
100 TABLET, FILM COATED ORAL
Qty: 90 TABLET | Refills: 1 | Status: SHIPPED | OUTPATIENT
Start: 2022-09-08 | End: 2022-09-08 | Stop reason: SDUPTHER

## 2022-09-14 DIAGNOSIS — E78.5 HYPERLIPIDEMIA, UNSPECIFIED HYPERLIPIDEMIA TYPE: ICD-10-CM

## 2022-09-14 RX ORDER — SIMVASTATIN 20 MG
20 TABLET ORAL
Qty: 90 TABLET | Refills: 1 | Status: SHIPPED | OUTPATIENT
Start: 2022-09-14

## 2022-09-30 ENCOUNTER — HOSPITAL ENCOUNTER (OUTPATIENT)
Dept: RADIOLOGY | Age: 77
Discharge: HOME | End: 2022-09-30
Payer: MEDICARE

## 2022-09-30 VITALS — WEIGHT: 168 LBS | HEIGHT: 64 IN | BODY MASS INDEX: 28.68 KG/M2

## 2022-09-30 DIAGNOSIS — Z12.31 ENCOUNTER FOR SCREENING MAMMOGRAM FOR MALIGNANT NEOPLASM OF BREAST: ICD-10-CM

## 2022-09-30 PROCEDURE — 77067 SCR MAMMO BI INCL CAD: CPT

## 2022-09-30 PROCEDURE — 77063 BREAST TOMOSYNTHESIS BI: CPT

## 2022-11-17 DIAGNOSIS — G56.03 BILATERAL CARPAL TUNNEL SYNDROME: ICD-10-CM

## 2022-11-17 RX ORDER — GABAPENTIN 300 MG/1
300 CAPSULE ORAL
Qty: 90 CAPSULE | Refills: 1 | Status: SHIPPED | OUTPATIENT
Start: 2022-11-17

## 2022-11-17 RX ORDER — GABAPENTIN 300 MG/1
300 CAPSULE ORAL
Qty: 90 CAPSULE | Refills: 1 | Status: SHIPPED | OUTPATIENT
Start: 2022-11-17 | End: 2022-11-17 | Stop reason: SDUPTHER

## 2022-12-06 ENCOUNTER — TELEPHONE (OUTPATIENT)
Dept: ADMINISTRATIVE | Facility: OTHER | Age: 77
End: 2022-12-06

## 2022-12-06 ENCOUNTER — APPOINTMENT (OUTPATIENT)
Dept: LAB | Age: 77
End: 2022-12-06

## 2022-12-06 ENCOUNTER — OFFICE VISIT (OUTPATIENT)
Dept: INTERNAL MEDICINE CLINIC | Age: 77
End: 2022-12-06

## 2022-12-06 VITALS
HEART RATE: 73 BPM | HEIGHT: 64 IN | DIASTOLIC BLOOD PRESSURE: 82 MMHG | SYSTOLIC BLOOD PRESSURE: 128 MMHG | WEIGHT: 176 LBS | BODY MASS INDEX: 30.05 KG/M2 | TEMPERATURE: 98.2 F | OXYGEN SATURATION: 97 %

## 2022-12-06 DIAGNOSIS — L71.9 ROSACEA: ICD-10-CM

## 2022-12-06 DIAGNOSIS — K21.9 GASTROESOPHAGEAL REFLUX DISEASE WITHOUT ESOPHAGITIS: ICD-10-CM

## 2022-12-06 DIAGNOSIS — F41.9 ANXIETY: ICD-10-CM

## 2022-12-06 DIAGNOSIS — E78.00 HYPERCHOLESTEROLEMIA: ICD-10-CM

## 2022-12-06 DIAGNOSIS — E55.9 VITAMIN D DEFICIENCY: ICD-10-CM

## 2022-12-06 DIAGNOSIS — F32.89 OTHER DEPRESSION: ICD-10-CM

## 2022-12-06 DIAGNOSIS — M85.89 OSTEOPENIA OF MULTIPLE SITES: ICD-10-CM

## 2022-12-06 DIAGNOSIS — Z00.00 MEDICARE ANNUAL WELLNESS VISIT, SUBSEQUENT: Primary | ICD-10-CM

## 2022-12-06 LAB
25(OH)D3 SERPL-MCNC: 41.3 NG/ML (ref 30–100)
ALBUMIN SERPL BCP-MCNC: 3.6 G/DL (ref 3.5–5)
ALP SERPL-CCNC: 52 U/L (ref 46–116)
ALT SERPL W P-5'-P-CCNC: 26 U/L (ref 12–78)
ANION GAP SERPL CALCULATED.3IONS-SCNC: 5 MMOL/L (ref 4–13)
AST SERPL W P-5'-P-CCNC: 17 U/L (ref 5–45)
BASOPHILS # BLD AUTO: 0.04 THOUSANDS/ÂΜL (ref 0–0.1)
BASOPHILS NFR BLD AUTO: 1 % (ref 0–1)
BILIRUB SERPL-MCNC: 0.51 MG/DL (ref 0.2–1)
BUN SERPL-MCNC: 18 MG/DL (ref 5–25)
CALCIUM SERPL-MCNC: 9.7 MG/DL (ref 8.3–10.1)
CHLORIDE SERPL-SCNC: 110 MMOL/L (ref 96–108)
CHOLEST SERPL-MCNC: 200 MG/DL
CO2 SERPL-SCNC: 23 MMOL/L (ref 21–32)
CREAT SERPL-MCNC: 0.67 MG/DL (ref 0.6–1.3)
EOSINOPHIL # BLD AUTO: 0.17 THOUSAND/ÂΜL (ref 0–0.61)
EOSINOPHIL NFR BLD AUTO: 2 % (ref 0–6)
ERYTHROCYTE [DISTWIDTH] IN BLOOD BY AUTOMATED COUNT: 12.3 % (ref 11.6–15.1)
GFR SERPL CREATININE-BSD FRML MDRD: 86 ML/MIN/1.73SQ M
GLUCOSE P FAST SERPL-MCNC: 118 MG/DL (ref 65–99)
HCT VFR BLD AUTO: 41.3 % (ref 34.8–46.1)
HDLC SERPL-MCNC: 92 MG/DL
HGB BLD-MCNC: 13.7 G/DL (ref 11.5–15.4)
IMM GRANULOCYTES # BLD AUTO: 0.02 THOUSAND/UL (ref 0–0.2)
IMM GRANULOCYTES NFR BLD AUTO: 0 % (ref 0–2)
LDLC SERPL CALC-MCNC: 85 MG/DL (ref 0–100)
LYMPHOCYTES # BLD AUTO: 1.77 THOUSANDS/ÂΜL (ref 0.6–4.47)
LYMPHOCYTES NFR BLD AUTO: 25 % (ref 14–44)
MCH RBC QN AUTO: 31.5 PG (ref 26.8–34.3)
MCHC RBC AUTO-ENTMCNC: 33.2 G/DL (ref 31.4–37.4)
MCV RBC AUTO: 95 FL (ref 82–98)
MONOCYTES # BLD AUTO: 0.45 THOUSAND/ÂΜL (ref 0.17–1.22)
MONOCYTES NFR BLD AUTO: 7 % (ref 4–12)
NEUTROPHILS # BLD AUTO: 4.52 THOUSANDS/ÂΜL (ref 1.85–7.62)
NEUTS SEG NFR BLD AUTO: 65 % (ref 43–75)
NRBC BLD AUTO-RTO: 0 /100 WBCS
PLATELET # BLD AUTO: 203 THOUSANDS/UL (ref 149–390)
PMV BLD AUTO: 11.1 FL (ref 8.9–12.7)
POTASSIUM SERPL-SCNC: 3.9 MMOL/L (ref 3.5–5.3)
PROT SERPL-MCNC: 7.2 G/DL (ref 6.4–8.4)
RBC # BLD AUTO: 4.35 MILLION/UL (ref 3.81–5.12)
SODIUM SERPL-SCNC: 138 MMOL/L (ref 135–147)
TRIGL SERPL-MCNC: 114 MG/DL
TSH SERPL DL<=0.05 MIU/L-ACNC: 1.48 UIU/ML (ref 0.45–4.5)
WBC # BLD AUTO: 6.97 THOUSAND/UL (ref 4.31–10.16)

## 2022-12-06 RX ORDER — ALPRAZOLAM 0.5 MG/1
0.5 TABLET ORAL DAILY PRN
Qty: 30 TABLET | Refills: 1 | Status: SHIPPED | OUTPATIENT
Start: 2022-12-06

## 2022-12-06 NOTE — PATIENT INSTRUCTIONS
Medicare Preventive Visit Patient Instructions  Thank you for completing your Welcome to Medicare Visit or Medicare Annual Wellness Visit today  Your next wellness visit will be due in one year (12/7/2023)  The screening/preventive services that you may require over the next 5-10 years are detailed below  Some tests may not apply to you based off risk factors and/or age  Screening tests ordered at today's visit but not completed yet may show as past due  Also, please note that scanned in results may not display below  Preventive Screenings:  Service Recommendations Previous Testing/Comments   Colorectal Cancer Screening  * Colonoscopy    * Fecal Occult Blood Test (FOBT)/Fecal Immunochemical Test (FIT)  * Fecal DNA/Cologuard Test  * Flexible Sigmoidoscopy Age: 39-70 years old   Colonoscopy: every 10 years (may be performed more frequently if at higher risk)  OR  FOBT/FIT: every 1 year  OR  Cologuard: every 3 years  OR  Sigmoidoscopy: every 5 years  Screening may be recommended earlier than age 39 if at higher risk for colorectal cancer  Also, an individualized decision between you and your healthcare provider will decide whether screening between the ages of 74-80 would be appropriate  Colonoscopy: 07/18/2013  FOBT/FIT: Not on file  Cologuard: 01/05/2021  Sigmoidoscopy: Not on file          Breast Cancer Screening Age: 36 years old  Frequency: every 1-2 years  Not required if history of left and right mastectomy Mammogram: 09/30/2022    Screening Current   Cervical Cancer Screening Between the ages of 21-29, pap smear recommended once every 3 years  Between the ages of 33-67, can perform pap smear with HPV co-testing every 5 years     Recommendations may differ for women with a history of total hysterectomy, cervical cancer, or abnormal pap smears in past  Pap Smear: Not on file    Screening Not Indicated   Hepatitis C Screening Once for adults born between 1945 and 1965  More frequently in patients at high risk for Hepatitis C Hep C Antibody: 02/08/2019    Screening Current   Diabetes Screening 1-2 times per year if you're at risk for diabetes or have pre-diabetes Fasting glucose: 109 mg/dL (6/1/2022)  A1C: 5 6 % (9/21/2020)  Screening Current   Cholesterol Screening Once every 5 years if you don't have a lipid disorder  May order more often based on risk factors  Lipid panel: 06/01/2022    Screening Not Indicated  History Lipid Disorder     Other Preventive Screenings Covered by Medicare:  1  Abdominal Aortic Aneurysm (AAA) Screening: covered once if your at risk  You're considered to be at risk if you have a family history of AAA  2  Lung Cancer Screening: covers low dose CT scan once per year if you meet all of the following conditions: (1) Age 50-69; (2) No signs or symptoms of lung cancer; (3) Current smoker or have quit smoking within the last 15 years; (4) You have a tobacco smoking history of at least 20 pack years (packs per day multiplied by number of years you smoked); (5) You get a written order from a healthcare provider  3  Glaucoma Screening: covered annually if you're considered high risk: (1) You have diabetes OR (2) Family history of glaucoma OR (3)  aged 48 and older OR (3)  American aged 72 and older  3  Osteoporosis Screening: covered every 2 years if you meet one of the following conditions: (1) You're estrogen deficient and at risk for osteoporosis based off medical history and other findings; (2) Have a vertebral abnormality; (3) On glucocorticoid therapy for more than 3 months; (4) Have primary hyperparathyroidism; (5) On osteoporosis medications and need to assess response to drug therapy  · Last bone density test (DXA Scan): 04/08/2021   5  HIV Screening: covered annually if you're between the age of 15-65  Also covered annually if you are younger than 13 and older than 72 with risk factors for HIV infection   For pregnant patients, it is covered up to 3 times per pregnancy  Immunizations:  Immunization Recommendations   Influenza Vaccine Annual influenza vaccination during flu season is recommended for all persons aged >= 6 months who do not have contraindications   Pneumococcal Vaccine   * Pneumococcal conjugate vaccine = PCV13 (Prevnar 13), PCV15 (Vaxneuvance), PCV20 (Prevnar 20)  * Pneumococcal polysaccharide vaccine = PPSV23 (Pneumovax) Adults 25-60 years old: 1-3 doses may be recommended based on certain risk factors  Adults 72 years old: 1-2 doses may be recommended based off what pneumonia vaccine you previously received   Hepatitis B Vaccine 3 dose series if at intermediate or high risk (ex: diabetes, end stage renal disease, liver disease)   Tetanus (Td) Vaccine - COST NOT COVERED BY MEDICARE PART B Following completion of primary series, a booster dose should be given every 10 years to maintain immunity against tetanus  Td may also be given as tetanus wound prophylaxis  Tdap Vaccine - COST NOT COVERED BY MEDICARE PART B Recommended at least once for all adults  For pregnant patients, recommended with each pregnancy  Shingles Vaccine (Shingrix) - COST NOT COVERED BY MEDICARE PART B  2 shot series recommended in those aged 48 and above     Health Maintenance Due:      Topic Date Due   • Cervical Cancer Screening  Never done   • Colorectal Cancer Screening  01/06/2021   • Breast Cancer Screening: Mammogram  09/30/2023   • Hepatitis C Screening  Completed     Immunizations Due:      Topic Date Due   • Hepatitis B Vaccine (1 of 3 - 3-dose series) Never done   • COVID-19 Vaccine (4 - Booster for Moderna series) 04/26/2022   • Influenza Vaccine (1) 09/01/2022     Advance Directives   What are advance directives? Advance directives are legal documents that state your wishes and plans for medical care  These plans are made ahead of time in case you lose your ability to make decisions for yourself   Advance directives can apply to any medical decision, such as the treatments you want, and if you want to donate organs  What are the types of advance directives? There are many types of advance directives, and each state has rules about how to use them  You may choose a combination of any of the following:  · Living will: This is a written record of the treatment you want  You can also choose which treatments you do not want, which to limit, and which to stop at a certain time  This includes surgery, medicine, IV fluid, and tube feedings  · Durable power of  for healthcare [facility]): This is a written record that states who you want to make healthcare choices for you when you are unable to make them for yourself  This person, called a proxy, is usually a family member or a friend  You may choose more than 1 proxy  · Do not resuscitate (DNR) order:  A DNR order is used in case your heart stops beating or you stop breathing  It is a request not to have certain forms of treatment, such as CPR  A DNR order may be included in other types of advance directives  · Medical directive: This covers the care that you want if you are in a coma, near death, or unable to make decisions for yourself  You can list the treatments you want for each condition  Treatment may include pain medicine, surgery, blood transfusions, dialysis, IV or tube feedings, and a ventilator (breathing machine)  · Values history: This document has questions about your views, beliefs, and how you feel and think about life  This information can help others choose the care that you would choose  Why are advance directives important? An advance directive helps you control your care  Although spoken wishes may be used, it is better to have your wishes written down  Spoken wishes can be misunderstood, or not followed  Treatments may be given even if you do not want them  An advance directive may make it easier for your family to make difficult choices about your care     Fall Prevention    Fall prevention includes ways to make your home and other areas safer  It also includes ways you can move more carefully to prevent a fall  Health conditions that cause changes in your blood pressure, vision, or muscle strength and coordination may increase your risk for falls  Medicines may also increase your risk for falls if they make you dizzy, weak, or sleepy  Fall prevention tips:   · Stand or sit up slowly  · Use assistive devices as directed  · Wear shoes that fit well and have soles that   · Wear a personal alarm  · Stay active  · Manage your medical conditions  Home Safety Tips:  · Add items to prevent falls in the bathroom  · Keep paths clear  · Install bright lights in your home  · Keep items you use often on shelves within reach  · Paint or place reflective tape on the edges of your stairs  Urinary Incontinence   Urinary incontinence (UI)  is when you lose control of your bladder  UI develops because your bladder cannot store or empty urine properly  The 3 most common types of UI are stress incontinence, urge incontinence, or both  Medicines:   · May be given to help strengthen your bladder control  Report any side effects of medication to your healthcare provider  Do pelvic muscle exercises often:  Your pelvic muscles help you stop urinating  Squeeze these muscles tight for 5 seconds, then relax for 5 seconds  Gradually work up to squeezing for 10 seconds  Do 3 sets of 15 repetitions a day, or as directed  This will help strengthen your pelvic muscles and improve bladder control  Train your bladder:  Go to the bathroom at set times, such as every 2 hours, even if you do not feel the urge to go  You can also try to hold your urine when you feel the urge to go  For example, hold your urine for 5 minutes when you feel the urge to go  As that becomes easier, hold your urine for 10 minutes  Self-care:   · Keep a UI record    Write down how often you leak urine and how much you leak  Make a note of what you were doing when you leaked urine  · Drink liquids as directed  You may need to limit the amount of liquid you drink to help control your urine leakage  Do not drink any liquid right before you go to bed  Limit or do not have drinks that contain caffeine or alcohol  · Prevent constipation  Eat a variety of high-fiber foods  Good examples are high-fiber cereals, beans, vegetables, and whole-grain breads  Walking is the best way to trigger your intestines to have a bowel movement  · Exercise regularly and maintain a healthy weight  Weight loss and exercise will decrease pressure on your bladder and help you control your leakage  · Use a catheter as directed  to help empty your bladder  A catheter is a tiny, plastic tube that is put into your bladder to drain your urine  · Go to behavior therapy as directed  Behavior therapy may be used to help you learn to control your urge to urinate  Weight Management   Why it is important to manage your weight:  Being overweight increases your risk of health conditions such as heart disease, high blood pressure, type 2 diabetes, and certain types of cancer  It can also increase your risk for osteoarthritis, sleep apnea, and other respiratory problems  Aim for a slow, steady weight loss  Even a small amount of weight loss can lower your risk of health problems  How to lose weight safely:  A safe and healthy way to lose weight is to eat fewer calories and get regular exercise  You can lose up about 1 pound a week by decreasing the number of calories you eat by 500 calories each day  Healthy meal plan for weight management:  A healthy meal plan includes a variety of foods, contains fewer calories, and helps you stay healthy  A healthy meal plan includes the following:  · Eat whole-grain foods more often  A healthy meal plan should contain fiber  Fiber is the part of grains, fruits, and vegetables that is not broken down by your body  Whole-grain foods are healthy and provide extra fiber in your diet  Some examples of whole-grain foods are whole-wheat breads and pastas, oatmeal, brown rice, and bulgur  · Eat a variety of vegetables every day  Include dark, leafy greens such as spinach, kale, simeon greens, and mustard greens  Eat yellow and orange vegetables such as carrots, sweet potatoes, and winter squash  · Eat a variety of fruits every day  Choose fresh or canned fruit (canned in its own juice or light syrup) instead of juice  Fruit juice has very little or no fiber  · Eat low-fat dairy foods  Drink fat-free (skim) milk or 1% milk  Eat fat-free yogurt and low-fat cottage cheese  Try low-fat cheeses such as mozzarella and other reduced-fat cheeses  · Choose meat and other protein foods that are low in fat  Choose beans or other legumes such as split peas or lentils  Choose fish, skinless poultry (chicken or turkey), or lean cuts of red meat (beef or pork)  Before you cook meat or poultry, cut off any visible fat  · Use less fat and oil  Try baking foods instead of frying them  Add less fat, such as margarine, sour cream, regular salad dressing and mayonnaise to foods  Eat fewer high-fat foods  Some examples of high-fat foods include french fries, doughnuts, ice cream, and cakes  · Eat fewer sweets  Limit foods and drinks that are high in sugar  This includes candy, cookies, regular soda, and sweetened drinks  Exercise:  Exercise at least 30 minutes per day on most days of the week  Some examples of exercise include walking, biking, dancing, and swimming  You can also fit in more physical activity by taking the stairs instead of the elevator or parking farther away from stores  Ask your healthcare provider about the best exercise plan for you  © Copyright [employer] 2018 Information is for End User's use only and may not be sold, redistributed or otherwise used for commercial purposes   All illustrations and images included in [facility] are the copyrighted property of [employer]  or Ana Guerrero

## 2022-12-06 NOTE — PROGRESS NOTES
Assessment/Plan:    1  Medicare annual wellness visit, subsequent    2  Anxiety  -     ALPRAZolam (XANAX) 0 5 mg tablet; Take 1 tablet (0 5 mg total) by mouth daily as needed (prn)  -     Vitamin D 25 hydroxy; Future  -     TSH, 3rd generation with Free T4 reflex; Future    3  Hypercholesterolemia  -     Lipid Panel with Direct LDL reflex; Future  -     TSH, 3rd generation with Free T4 reflex; Future  -     Comprehensive metabolic panel; Future  -     CBC and differential; Future    4  Osteopenia of multiple sites  -     Vitamin D 25 hydroxy; Future  -     DXA bone density spine hip and pelvis; Future; Expected date: 12/06/2022    5  Rosacea    6  Other depression    7  Gastroesophageal reflux disease without esophagitis    8  Vitamin D deficiency  -     Vitamin D 25 hydroxy; Future            Patient Instructions       Medicare Preventive Visit Patient Instructions  Thank you for completing your Welcome to Medicare Visit or Medicare Annual Wellness Visit today  Your next wellness visit will be due in one year (12/7/2023)  The screening/preventive services that you may require over the next 5-10 years are detailed below  Some tests may not apply to you based off risk factors and/or age  Screening tests ordered at today's visit but not completed yet may show as past due  Also, please note that scanned in results may not display below  Preventive Screenings:  Service Recommendations Previous Testing/Comments   Colorectal Cancer Screening  * Colonoscopy    * Fecal Occult Blood Test (FOBT)/Fecal Immunochemical Test (FIT)  * Fecal DNA/Cologuard Test  * Flexible Sigmoidoscopy Age: 39-70 years old   Colonoscopy: every 10 years (may be performed more frequently if at higher risk)  OR  FOBT/FIT: every 1 year  OR  Cologuard: every 3 years  OR  Sigmoidoscopy: every 5 years  Screening may be recommended earlier than age 39 if at higher risk for colorectal cancer   Also, an individualized decision between you and your healthcare provider will decide whether screening between the ages of 74-80 would be appropriate  Colonoscopy: 07/18/2013  FOBT/FIT: Not on file  Cologuard: 01/05/2021  Sigmoidoscopy: Not on file          Breast Cancer Screening Age: 36 years old  Frequency: every 1-2 years  Not required if history of left and right mastectomy Mammogram: 09/30/2022    Screening Current   Cervical Cancer Screening Between the ages of 21-29, pap smear recommended once every 3 years  Between the ages of 33-67, can perform pap smear with HPV co-testing every 5 years  Recommendations may differ for women with a history of total hysterectomy, cervical cancer, or abnormal pap smears in past  Pap Smear: Not on file    Screening Not Indicated   Hepatitis C Screening Once for adults born between 1945 and 1965  More frequently in patients at high risk for Hepatitis C Hep C Antibody: 02/08/2019    Screening Current   Diabetes Screening 1-2 times per year if you're at risk for diabetes or have pre-diabetes Fasting glucose: 109 mg/dL (6/1/2022)  A1C: 5 6 % (9/21/2020)  Screening Current   Cholesterol Screening Once every 5 years if you don't have a lipid disorder  May order more often based on risk factors  Lipid panel: 06/01/2022    Screening Not Indicated  History Lipid Disorder     Other Preventive Screenings Covered by Medicare:  1  Abdominal Aortic Aneurysm (AAA) Screening: covered once if your at risk  You're considered to be at risk if you have a family history of AAA  2  Lung Cancer Screening: covers low dose CT scan once per year if you meet all of the following conditions: (1) Age 50-69; (2) No signs or symptoms of lung cancer; (3) Current smoker or have quit smoking within the last 15 years; (4) You have a tobacco smoking history of at least 20 pack years (packs per day multiplied by number of years you smoked); (5) You get a written order from a healthcare provider    3  Glaucoma Screening: covered annually if you're considered high risk: (1) You have diabetes OR (2) Family history of glaucoma OR (3)  aged 48 and older OR (4)  American aged 72 and older  3  Osteoporosis Screening: covered every 2 years if you meet one of the following conditions: (1) You're estrogen deficient and at risk for osteoporosis based off medical history and other findings; (2) Have a vertebral abnormality; (3) On glucocorticoid therapy for more than 3 months; (4) Have primary hyperparathyroidism; (5) On osteoporosis medications and need to assess response to drug therapy  · Last bone density test (DXA Scan): 04/08/2021   5  HIV Screening: covered annually if you're between the age of 15-65  Also covered annually if you are younger than 13 and older than 72 with risk factors for HIV infection  For pregnant patients, it is covered up to 3 times per pregnancy  Immunizations:  Immunization Recommendations   Influenza Vaccine Annual influenza vaccination during flu season is recommended for all persons aged >= 6 months who do not have contraindications   Pneumococcal Vaccine   * Pneumococcal conjugate vaccine = PCV13 (Prevnar 13), PCV15 (Vaxneuvance), PCV20 (Prevnar 20)  * Pneumococcal polysaccharide vaccine = PPSV23 (Pneumovax) Adults 25-60 years old: 1-3 doses may be recommended based on certain risk factors  Adults 72 years old: 1-2 doses may be recommended based off what pneumonia vaccine you previously received   Hepatitis B Vaccine 3 dose series if at intermediate or high risk (ex: diabetes, end stage renal disease, liver disease)   Tetanus (Td) Vaccine - COST NOT COVERED BY MEDICARE PART B Following completion of primary series, a booster dose should be given every 10 years to maintain immunity against tetanus  Td may also be given as tetanus wound prophylaxis  Tdap Vaccine - COST NOT COVERED BY MEDICARE PART B Recommended at least once for all adults  For pregnant patients, recommended with each pregnancy     Shingles Vaccine (Shingrix) - COST NOT COVERED BY MEDICARE PART B  2 shot series recommended in those aged 48 and above     Health Maintenance Due:      Topic Date Due   • Cervical Cancer Screening  Never done   • Colorectal Cancer Screening  01/06/2021   • Breast Cancer Screening: Mammogram  09/30/2023   • Hepatitis C Screening  Completed     Immunizations Due:      Topic Date Due   • Hepatitis B Vaccine (1 of 3 - 3-dose series) Never done   • COVID-19 Vaccine (4 - Booster for Moderna series) 04/26/2022   • Influenza Vaccine (1) 09/01/2022     Advance Directives   What are advance directives? Advance directives are legal documents that state your wishes and plans for medical care  These plans are made ahead of time in case you lose your ability to make decisions for yourself  Advance directives can apply to any medical decision, such as the treatments you want, and if you want to donate organs  What are the types of advance directives? There are many types of advance directives, and each state has rules about how to use them  You may choose a combination of any of the following:  · Living will: This is a written record of the treatment you want  You can also choose which treatments you do not want, which to limit, and which to stop at a certain time  This includes surgery, medicine, IV fluid, and tube feedings  · Durable power of  for healthcare [facility]): This is a written record that states who you want to make healthcare choices for you when you are unable to make them for yourself  This person, called a proxy, is usually a family member or a friend  You may choose more than 1 proxy  · Do not resuscitate (DNR) order:  A DNR order is used in case your heart stops beating or you stop breathing  It is a request not to have certain forms of treatment, such as CPR  A DNR order may be included in other types of advance directives  · Medical directive:   This covers the care that you want if you are in a coma, near death, or unable to make decisions for yourself  You can list the treatments you want for each condition  Treatment may include pain medicine, surgery, blood transfusions, dialysis, IV or tube feedings, and a ventilator (breathing machine)  · Values history: This document has questions about your views, beliefs, and how you feel and think about life  This information can help others choose the care that you would choose  Why are advance directives important? An advance directive helps you control your care  Although spoken wishes may be used, it is better to have your wishes written down  Spoken wishes can be misunderstood, or not followed  Treatments may be given even if you do not want them  An advance directive may make it easier for your family to make difficult choices about your care  Fall Prevention    Fall prevention  includes ways to make your home and other areas safer  It also includes ways you can move more carefully to prevent a fall  Health conditions that cause changes in your blood pressure, vision, or muscle strength and coordination may increase your risk for falls  Medicines may also increase your risk for falls if they make you dizzy, weak, or sleepy  Fall prevention tips:   · Stand or sit up slowly  · Use assistive devices as directed  · Wear shoes that fit well and have soles that   · Wear a personal alarm  · Stay active  · Manage your medical conditions  Home Safety Tips:  · Add items to prevent falls in the bathroom  · Keep paths clear  · Install bright lights in your home  · Keep items you use often on shelves within reach  · Paint or place reflective tape on the edges of your stairs  Urinary Incontinence   Urinary incontinence (UI)  is when you lose control of your bladder  UI develops because your bladder cannot store or empty urine properly  The 3 most common types of UI are stress incontinence, urge incontinence, or both    Medicines:   · May be given to help strengthen your bladder control  Report any side effects of medication to your healthcare provider  Do pelvic muscle exercises often:  Your pelvic muscles help you stop urinating  Squeeze these muscles tight for 5 seconds, then relax for 5 seconds  Gradually work up to squeezing for 10 seconds  Do 3 sets of 15 repetitions a day, or as directed  This will help strengthen your pelvic muscles and improve bladder control  Train your bladder:  Go to the bathroom at set times, such as every 2 hours, even if you do not feel the urge to go  You can also try to hold your urine when you feel the urge to go  For example, hold your urine for 5 minutes when you feel the urge to go  As that becomes easier, hold your urine for 10 minutes  Self-care:   · Keep a UI record  Write down how often you leak urine and how much you leak  Make a note of what you were doing when you leaked urine  · Drink liquids as directed  You may need to limit the amount of liquid you drink to help control your urine leakage  Do not drink any liquid right before you go to bed  Limit or do not have drinks that contain caffeine or alcohol  · Prevent constipation  Eat a variety of high-fiber foods  Good examples are high-fiber cereals, beans, vegetables, and whole-grain breads  Walking is the best way to trigger your intestines to have a bowel movement  · Exercise regularly and maintain a healthy weight  Weight loss and exercise will decrease pressure on your bladder and help you control your leakage  · Use a catheter as directed  to help empty your bladder  A catheter is a tiny, plastic tube that is put into your bladder to drain your urine  · Go to behavior therapy as directed  Behavior therapy may be used to help you learn to control your urge to urinate      Weight Management   Why it is important to manage your weight:  Being overweight increases your risk of health conditions such as heart disease, high blood pressure, type 2 diabetes, and certain types of cancer  It can also increase your risk for osteoarthritis, sleep apnea, and other respiratory problems  Aim for a slow, steady weight loss  Even a small amount of weight loss can lower your risk of health problems  How to lose weight safely:  A safe and healthy way to lose weight is to eat fewer calories and get regular exercise  You can lose up about 1 pound a week by decreasing the number of calories you eat by 500 calories each day  Healthy meal plan for weight management:  A healthy meal plan includes a variety of foods, contains fewer calories, and helps you stay healthy  A healthy meal plan includes the following:  · Eat whole-grain foods more often  A healthy meal plan should contain fiber  Fiber is the part of grains, fruits, and vegetables that is not broken down by your body  Whole-grain foods are healthy and provide extra fiber in your diet  Some examples of whole-grain foods are whole-wheat breads and pastas, oatmeal, brown rice, and bulgur  · Eat a variety of vegetables every day  Include dark, leafy greens such as spinach, kale, simeon greens, and mustard greens  Eat yellow and orange vegetables such as carrots, sweet potatoes, and winter squash  · Eat a variety of fruits every day  Choose fresh or canned fruit (canned in its own juice or light syrup) instead of juice  Fruit juice has very little or no fiber  · Eat low-fat dairy foods  Drink fat-free (skim) milk or 1% milk  Eat fat-free yogurt and low-fat cottage cheese  Try low-fat cheeses such as mozzarella and other reduced-fat cheeses  · Choose meat and other protein foods that are low in fat  Choose beans or other legumes such as split peas or lentils  Choose fish, skinless poultry (chicken or turkey), or lean cuts of red meat (beef or pork)  Before you cook meat or poultry, cut off any visible fat  · Use less fat and oil  Try baking foods instead of frying them   Add less fat, such as margarine, sour cream, regular salad dressing and mayonnaise to foods  Eat fewer high-fat foods  Some examples of high-fat foods include french fries, doughnuts, ice cream, and cakes  · Eat fewer sweets  Limit foods and drinks that are high in sugar  This includes candy, cookies, regular soda, and sweetened drinks  Exercise:  Exercise at least 30 minutes per day on most days of the week  Some examples of exercise include walking, biking, dancing, and swimming  You can also fit in more physical activity by taking the stairs instead of the elevator or parking farther away from stores  Ask your healthcare provider about the best exercise plan for you  © Copyright [employer] 2018 Information is for End User's use only and may not be sold, redistributed or otherwise used for commercial purposes  All illustrations and images included in CareNotes® are the copyrighted property of [employer]  or [address]        Return in about 6 months (around 6/6/2023) for Recheck  Subjective:      Patient ID: Reji Mackay is a 78 y o  female  Chief Complaint   Patient presents with   • Follow-up     No labs, refills needed   • Medicare Wellness Visit     Sub awv       HPI    Hyperlipidemia (Follow-Up): The patient states her hyperlipidemia has been under good control since the last visit  She has no comorbid illnesses  Symptoms: denies chest pain,-denies intermittent leg claudication,-denies muscle pain-and-denies muscle weakness  Associated symptoms include no focal neurologic deficits-and-no memory loss  Medications: the patient is adherent with her medication regimen -the patient complains of medication side effects  The patient is doing well with her hyperlipidemia goals  the patient's LDL goal is <100 mg/dL  -the patient's last LDL was 72 mg/dL  -(5/2014)   February 2019, due for labs and has lab slip  August 2019, very well controlled  March 2020:  Not able to get labs done since they are closed right now however compliant with the medication and does not need refills  November 2021, had lab work done in September   Nicely controlled levels   No issueson simvastatin 20 mg daily and levels are well controlled   No side effects   DEc 2022: well controlled; no issues     Gastroesophageal Reflux Disease (Follow-Up): The patient is being seen for follow-up of gastroesophageal reflux disease   Is essentially resolved symptoms   Not on anything on a regular basis  March 2020, well controlled November 2021, controlled   Not on daily PPI  DEc 2022: stable      Depression (Follow-Up): The patient states her depression has been stable since the last visit  She has no comorbid illnesses  Interval Events: doing well but  recently dx with lung cancer after brain surgery and colonoscopy  She has had no significant interval events  Interval Symptoms: improved depression,-improved depressed mood,-improved loss of interest or pleasure in activities,-stable insomnia,-denies feelings of worthlessness,-denies feelings of guilt,-denies trouble concentrating,-improved anxiety-and-denies sexual dysfunction   February 2019,  passed away in the winter 2018 but she has a very large support system and is doing relatively well   Taking it day by day  Associated symptoms include: no recent weight gain-and-no thoughts of suicide  Social Support: the patient has good social support     Medications: the patient is adherent with her medication regimen -She denies medication side effects   Pt has been on zoloft >5 years  Renato Reyes she feels really stable on it and does not feel the need to go off of it August 2019, doing very well and does not require any medication changes  March 2020, doing very well with present dose Zoloft with no breakthrough symptoms   No suicidal or homicidal ideations   April 2021, no HI or SI and tolerating medication well   Symptoms are well controlled with no flare ups   Does not need a change in dose    November 2021, no HI or SI   Doing well with Zoloft   No breakthrough symptoms         Osteopenia of multiple sites   Takes vitamin-D and calcium on a regular basis and is very active   Up-to-date on bone density study        The following portions of the patient's history were reviewed and updated as appropriate: allergies, current medications, past family history, past medical history, past social history, past surgical history and problem list     Review of Systems      Constitutional:  Denies fever or chills , + weight gain  Eyes:  Denies double , blurry vision or eye pain  HENT:  Denies nasal congestion or sore throat   Respiratory:  Denies cough or shortness of breath or wheezing  Cardiovascular:  Denies palpitations or chest pain  GI:  Denies abdominal pain, nausea, or vomiting, no loose stools, no reflux  Integument:  Denies rash , no open areas  Neurologic:  Denies headache or focal weakness, no dizziness  : no dysuria, or hematuria      Current Outpatient Medications   Medication Sig Dispense Refill   • ALPRAZolam (XANAX) 0 5 mg tablet Take 1 tablet (0 5 mg total) by mouth daily as needed (prn) 30 tablet 1   • B Complex Vitamins (B COMPLEX PO) Take 1 capsule by mouth daily      • CALCIUM PO Take 1,200 mg by mouth daily      • Cholecalciferol (VITAMIN D3 PO) Take 1 capsule by mouth daily      • gabapentin (NEURONTIN) 300 mg capsule Take 1 capsule (300 mg total) by mouth daily at bedtime 90 capsule 1   • pantoprazole (PROTONIX) 40 mg tablet Take 1 tablet (40 mg total) by mouth daily as needed (prn) 90 tablet 1   • sertraline (ZOLOFT) 100 mg tablet Take 1 tablet (100 mg total) by mouth daily at bedtime 90 tablet 1   • simvastatin (ZOCOR) 20 mg tablet Take 1 tablet (20 mg total) by mouth daily at bedtime 90 tablet 1     No current facility-administered medications for this visit         Objective:    /82 (BP Location: Left arm, Patient Position: Sitting, Cuff Size: Adult)   Pulse 73   Temp 98 2 °F (36 8 °C) (Temporal)   Ht 5' 4" (1 626 m)   Wt 79 8 kg (176 lb)   SpO2 97% Comment: ra  BMI 30 21 kg/m²        Physical Exam       Constitutional:  Well developed, well nourished, no acute distress, non-toxic appearance   Eyes:  PERRL, conjunctiva normal , non icteric sclera  HENT:  Atraumatic, oropharynx moist  Neck-  supple   Respiratory:  CTA b/l, normal breath sounds, no rales, no wheezing   Cardiovascular:  RRR, no murmurs, no LE edema b/l  GI:  Soft, nondistended, normal bowel sounds x 4, nontender, no organomegaly, no mass, no rebound, no guarding   Neurologic:  no focal deficits noted   Psychiatric:  Speech and behavior appropriate , AAO x 3  MS; slight tender to palpation under ankles b/l       Shai Medeiros,

## 2022-12-06 NOTE — TELEPHONE ENCOUNTER
Upon review of the In Basket request we were able to locate, review, and update the patient chart as requested for CRC: Coljose elias  Any additional questions or concerns should be emailed to the Practice Liaisons via the appropriate education email address, please do not reply via In Basket      Thank you  Ryan Lew

## 2022-12-06 NOTE — TELEPHONE ENCOUNTER
----- Message from Hector Gaitan MA sent at 12/6/2022 10:06 AM EST -----  Regarding: cologuard  12/06/22 10:06 AM    Hello, our patient Elmer Ladd has had CRC: Cologuard completed/performed  Please assist in updating the patient chart by pulling the document from the Media Tab  and pulling the document from labs Tab within Chart Review  The date of service is 2021       Thank you,  Hector Gaitan MA  [facility] [address] [facility] [place]

## 2022-12-06 NOTE — PROGRESS NOTES
Assessment and Plan:     Problem List Items Addressed This Visit        Other    Medicare annual wellness visit, subsequent - Primary   Other Visit Diagnoses     Anxiety            BMI Counseling: Body mass index is 30 21 kg/m²  The BMI is above normal  Nutrition recommendations include moderation in carbohydrate intake  Exercise recommendations include exercising 3-5 times per week  No pharmacotherapy was ordered  Rationale for BMI follow-up plan is due to patient being overweight or obese  Falls Plan of Care: balance, strength, and gait training instructions were provided  Preventive health issues were discussed with patient, and age appropriate screening tests were ordered as noted in patient's After Visit Summary  Personalized health advice and appropriate referrals for health education or preventive services given if needed, as noted in patient's After Visit Summary       History of Present Illness:     Patient presents for a Medicare Wellness Visit    HPI   Patient Care Team:  Lorne Villavicencio DO as PCP - General     Review of Systems:     Review of Systems     Problem List:     Patient Active Problem List   Diagnosis   • Hypercholesterolemia   • Gastroesophageal reflux disease without esophagitis   • Depression   • Rosacea   • History of arthroscopy of left knee   • Status post total left knee replacement   • Bilateral carpal tunnel syndrome   • Medicare annual wellness visit, subsequent   • Hip pain, left   • Osteopenia of multiple sites   • Non-seasonal allergic rhinitis due to pollen   • COVID-19 virus infection   • Seborrheic keratoses, inflamed      Past Medical and Surgical History:     Past Medical History:   Diagnosis Date   • Hyperlipidemia    • Skin tag     last assessed 4/21/17, resolved 10/2/17      Past Surgical History:   Procedure Laterality Date   • COLONOSCOPY     • KNEE ARTHROSCOPY Left    • KNEE ARTHROSCOPY W/ MENISCAL REPAIR Left 2000   • NC TOTAL KNEE ARTHROPLASTY Left 10/24/2019 Procedure: ARTHROPLASTY KNEE TOTAL;  Surgeon: Nadine Rivas MD;  Location: [facility];  Service: Orthopedics   • TONSILLECTOMY AND ADENOIDECTOMY        Family History:     Family History   Problem Relation Age of Onset   • Stroke Mother         CVA   • Hyperlipidemia Mother    • Stroke Maternal Grandfather         CVA   • Stroke Family         CVA   • Other Family         breast disorders   • Prostate cancer Father 76   • Other Maternal Aunt         breast disorders    • No Known Problems Sister    • No Known Problems Daughter    • No Known Problems Maternal Grandmother    • No Known Problems Paternal Grandmother    • No Known Problems Paternal Grandfather    • No Known Problems Daughter    • No Known Problems Son    • Breast cancer Maternal Aunt 60   • No Known Problems Maternal Aunt    • No Known Problems Maternal Aunt    • No Known Problems Maternal Aunt    • No Known Problems Maternal Aunt    • No Known Problems Paternal Aunt    • No Known Problems Paternal Aunt       Social History:     Social History     Socioeconomic History   • Marital status:      Spouse name: None   • Number of children: 3   • Years of education: None   • Highest education level: None   Occupational History   • Occupation: Caterer   Tobacco Use   • Smoking status: Former     Packs/day: 0 50     Years: 2 00     Pack years: 1 00     Types: Cigarettes     Quit date:      Years since quittin 9   • Smokeless tobacco: Never   Vaping Use   • Vaping Use: Never used   Substance and Sexual Activity   • Alcohol use: Yes     Comment: socially 0-2 weekly      • Drug use: No   • Sexual activity: Not Currently   Other Topics Concern   • None   Social History Narrative    Caffeine use, admits to consuming soda    Daily coffee consumption (4 cups/day)     Exercising regularly      Social Determinants of Health     Financial Resource Strain: Low Risk    • Difficulty of Paying Living Expenses: Not very hard   Food Insecurity: Not on file Transportation Needs: No Transportation Needs   • Lack of Transportation (Medical): No   • Lack of Transportation (Non-Medical): No   Physical Activity: Not on file   Stress: Not on file   Social Connections: Not on file   Intimate Partner Violence: Not on file   Housing Stability: Not on file      Medications and Allergies:     Current Outpatient Medications   Medication Sig Dispense Refill   • ALPRAZolam (XANAX) 0 5 mg tablet Take 1 tablet (0 5 mg total) by mouth daily as needed (prn) 30 tablet 1   • B Complex Vitamins (B COMPLEX PO) Take 1 capsule by mouth daily      • CALCIUM PO Take 1,200 mg by mouth daily      • Cholecalciferol (VITAMIN D3 PO) Take 1 capsule by mouth daily      • gabapentin (NEURONTIN) 300 mg capsule Take 1 capsule (300 mg total) by mouth daily at bedtime 90 capsule 1   • pantoprazole (PROTONIX) 40 mg tablet Take 1 tablet (40 mg total) by mouth daily as needed (prn) 90 tablet 1   • sertraline (ZOLOFT) 100 mg tablet Take 1 tablet (100 mg total) by mouth daily at bedtime 90 tablet 1   • simvastatin (ZOCOR) 20 mg tablet Take 1 tablet (20 mg total) by mouth daily at bedtime 90 tablet 1     No current facility-administered medications for this visit       Allergies   Allergen Reactions   • Tetracyclines & Related      Blisters, yeast infection      Immunizations:     Immunization History   Administered Date(s) Administered   • COVID-19 MODERNA VACC 0 5 ML IM 01/18/2021, 02/26/2021, 12/26/2021   • INFLUENZA 11/01/2018, 10/14/2019   • Influenza Split High Dose Preservative Free IM 10/05/2015, 12/21/2016, 10/20/2017, 10/14/2019   • Influenza, high dose seasonal 0 7 mL 09/24/2020, 11/11/2021   • Pneumococcal Conjugate 13-Valent 04/21/2017   • Pneumococcal Polysaccharide PPV23 02/07/2019      Health Maintenance:         Topic Date Due   • Cervical Cancer Screening  Never done   • Colorectal Cancer Screening  01/06/2021   • Breast Cancer Screening: Mammogram  09/30/2023   • Hepatitis C Screening Completed         Topic Date Due   • Hepatitis B Vaccine (1 of 3 - 3-dose series) Never done   • COVID-19 Vaccine (4 - Booster for Moderna series) 04/26/2022   • Influenza Vaccine (1) 09/01/2022      Medicare Screening Tests and Risk Assessments:     Phillip Holguin is here for her Subsequent Wellness visit  Last Medicare Wellness visit information reviewed, patient interviewed and updates made to the record today  Health Risk Assessment:   Patient rates overall health as very good  Patient feels that their physical health rating is same  Patient is very satisfied with their life  Eyesight was rated as same  Hearing was rated as same  Patient feels that their emotional and mental health rating is same  Patients states they are never, rarely angry  Patient states they are sometimes unusually tired/fatigued  Pain experienced in the last 7 days has been some  Patient's pain rating has been 3/10  Patient states that she has experienced no weight loss or gain in last 6 months  Depression Screening:   PHQ-9 Score: 0      Fall Risk Screening: In the past year, patient has experienced: history of falling in past year    Number of falls: 2 or more  Injured during fall?: No    Feels unsteady when standing or walking?: No    Worried about falling?: Yes      Urinary Incontinence Screening:   Patient has leaked urine accidently in the last six months  Home Safety:  Patient does not have trouble with stairs inside or outside of their home  and has working carbon monoxide detector  Home safety hazards include: none  Nutrition:   Current diet is Regular  Medications:   Patient is currently taking over-the-counter supplements  OTC medications include: see medication list  Patient is able to manage medications       Activities of Daily Living (ADLs)/Instrumental Activities of Daily Living (IADLs):   Walk and transfer into and out of bed and chair?: Yes  Dress and groom yourself?: Yes    Bathe or shower yourself?: Yes Feed yourself? Yes  Do your laundry/housekeeping?: Yes  Manage your money, pay your bills and track your expenses?: Yes  Make your own meals?: Yes    Do your own shopping?: Yes    Previous Hospitalizations:   Any hospitalizations or ED visits within the last 12 months?: No      Advance Care Planning:   Living will: Yes    Advanced directive: Yes      Comments: Her children    Cognitive Screening:   Provider or family/friend/caregiver concerned regarding cognition?: No    PREVENTIVE SCREENINGS      Cardiovascular Screening:    General: Screening Not Indicated and History Lipid Disorder      Diabetes Screening:     General: Screening Current      Breast Cancer Screening:     General: Screening Current      Cervical Cancer Screening:    General: Screening Not Indicated      Lung Cancer Screening:     General: Screening Not Indicated      Hepatitis C Screening:    General: Screening Current    Screening, Brief Intervention, and Referral to Treatment (SBIRT)    Screening  Typical number of drinks in a day: 0  Typical number of drinks in a week: 2  Interpretation: Low risk drinking behavior  Brief Intervention  Alcohol & drug use screenings were reviewed  No concerns regarding substance use disorder identified  No results found       Physical Exam:     /82 (BP Location: Left arm, Patient Position: Sitting, Cuff Size: Adult)   Pulse 73   Temp 98 2 °F (36 8 °C) (Temporal)   Ht 5' 4" (1 626 m)   Wt 79 8 kg (176 lb)   SpO2 97% Comment: ra  BMI 30 21 kg/m²     Physical Exam     Ghislaien Castillo DO

## 2022-12-12 ENCOUNTER — TELEPHONE (OUTPATIENT)
Dept: INTERNAL MEDICINE CLINIC | Age: 77
End: 2022-12-12

## 2022-12-12 DIAGNOSIS — E78.5 HYPERLIPIDEMIA, UNSPECIFIED HYPERLIPIDEMIA TYPE: ICD-10-CM

## 2022-12-12 DIAGNOSIS — K21.9 GASTROESOPHAGEAL REFLUX DISEASE WITHOUT ESOPHAGITIS: ICD-10-CM

## 2022-12-12 DIAGNOSIS — F41.9 ANXIETY: ICD-10-CM

## 2022-12-12 RX ORDER — SERTRALINE HYDROCHLORIDE 100 MG/1
100 TABLET, FILM COATED ORAL
Qty: 90 TABLET | Refills: 1 | Status: SHIPPED | OUTPATIENT
Start: 2022-12-12

## 2022-12-12 RX ORDER — SIMVASTATIN 20 MG
20 TABLET ORAL
Qty: 90 TABLET | Refills: 1 | Status: SHIPPED | OUTPATIENT
Start: 2022-12-12

## 2022-12-12 NOTE — TELEPHONE ENCOUNTER
RX for the following to her new pharmacy [facility]    Simvastatin 20 mg one tablet daily at bedtime    Sertraline 100 mg one tablet daily at bedtime    Was just seen this month and forgot to get the new scripts for these medication

## 2023-03-22 NOTE — PROGRESS NOTES
Assessment/Plan:         Diagnoses and all orders for this visit:    Pre-op examination  Comments:  Pt at medical baseline and cleared for necessary surgery     Screening for colon cancer  -     Ambulatory referral to Colorectal Surgery; Future    Primary osteoarthritis of left knee  Comments:  discussed nsaid use, pt will avoid use for 7 days prior to surgery  may take tylenol prn     Hypercholesterolemia  Comments:  continue simvastatin as directed   low chol diet     Other depression    Other orders  -     Cancel: Cologuard; Future          Falls Plan of Care: balance, strength, and gait training instructions were provided  pt going for knee replacement and will require PT after this     Discussed need for f/u colonoscopy, pt had last one approx 8 years ago with Dr Akira Mena and is due for f/u  She will schedule after she is rehabilitated from knee surgery  Subjective:      Patient ID: Pepe Newberry is a 69 y o  female  Presurgical Evaluation    Subjective:     Patient ID: Pepe Newberry is a 69 y o  female  Patient presents with:  Pre-op Exam: pt  presents for PRE OP for L knee surgery  Dr Paula Markham 10/24/19 at [facility]  Pt has been limited by her knee in her daily activities, pain prevents exercising but she is able to walk without difficulty   No hx of cad, chf  Pt very active, denies cp, sob, recent uri sx, night sweats or fevers          The following portions of the patient's history were reviewed and updated as appropriate: allergies, current medications, past family history, past medical history, past social history, past surgical history and problem list     Procedure date: 10/24/19    Surgeon:  Dr Paula Markham  Planned procedure:  L total knee   Diagnosis for procedure:  oa    Prior anesthesia: Yes   General; Complications:  None / Tolerated well    CAD History: None   NOTE: Patient should see Cardiology if time available before surgery, and if appropriate      Pulmonary History: None    Renal history: None    Diabetes History:  None     Neurological History: None     On Immunosuppressant meds/biologics: No        Current Outpatient Medications:  ALPRAZolam (XANAX) 0 5 mg tablet, Take 1 tablet (0 5 mg total) by mouth daily as needed (prn), Disp: 30 tablet, Rfl: 1  ascorbic acid (VITAMIN C) 500 mg tablet, Take 1 tablet (500 mg total) by mouth daily for 60 doses, Disp: 30 tablet, Rfl: 1  B Complex Vitamins (B COMPLEX PO), Take by mouth, Disp: , Rfl:   CALCIUM PO, Take by mouth, Disp: , Rfl:   Cholecalciferol (VITAMIN D3 PO), Take by mouth, Disp: , Rfl:   enoxaparin (LOVENOX) 40 mg/0 4 mL, Inject 0 4 mL (40 mg total) under the skin daily in the early morning for 28 days Provided preop to be used after the surgeryDo not start or use this medication prior to your operation, Disp: 28 Syringe, Rfl: 0  ferrous sulfate 324 (65 Fe) mg, Take 1 tablet (324 mg total) by mouth 2 (two) times a day before meals Take 1 pill BID, PRE-OP with Vit C, may be constipatory, Disp: 60 tablet, Rfl: 0  folic acid (FOLVITE) 1 mg tablet, Take 1 tablet (1 mg total) by mouth daily for 30 doses Take 1 pill po Qday PRE-OP, with Vit C, and Iron, Disp: 30 tablet, Rfl: 0  Ibuprofen-Diphenhydramine Cit 200-38 MG TABS, Take by mouth as needed, Disp: , Rfl:   sertraline (ZOLOFT) 100 mg tablet, Take 1 tablet (100 mg total) by mouth daily, Disp: 90 tablet, Rfl: 1  simvastatin (ZOCOR) 20 mg tablet, Take 1 tablet (20 mg total) by mouth daily at bedtime, Disp: 90 tablet            Preop labs/testing available and reviewed: yes    eGFR       Date                     Value               Ref Range           Status                10/09/2019               92                  ml/min/1 73sq m     Final            ----------  WBC       Date                     Value               Ref Range           Status                10/09/2019               7 09                4 31 - 10 16 T*     Final            ----------  INR       Date                     Value Ref Range           Status                10/09/2019               0 98                0 84 - 1 19         Final            ----------    EKG yes    Echo no    Stress test/cath no    PFT/Fabrizio no    Functional capacity: Shovel snow                          6 Mets   Pick the highest level patient can comfortably perform   4 mets or greater for surgery    RCRI  High Risk surgery? 1 Point  CAD History:         1 Point   MI; Positive Stress Test; CP due to Mi;  Nitrate Usage to control Angina; Pathologic Q wave on EKG  CHF Active:         1 Point   Pulm Edema; Paroxysmal Nocturnal Dyspnea;  Bibasilar Rales (crackles);S3; CHF on CXR  Cerebrovascular Disease (TIA or CVA):     1 Point  DM on Insulin:        1 Point  Serum Creat >2 0 mg/dl:       1 Point          Total Points: 0     Scorin: Class I, Very Low Risk (0 4%)     1: Class II, Low risk (0 9%)     2: Class III Moderate (6 6%)     3: Class IV High (>11%)      ANA Risk:  GFR: eGFR       Date                     Value               Ref Range           Status                10/09/2019               92                  ml/min/1 73sq m     Final            ----------                        The following portions of the patient's history were reviewed and updated as appropriate: allergies, current medications, past family history, past medical history, past social history, past surgical history and problem list     Review of Systems   Constitutional: Negative for chills, fatigue and fever  HENT: Negative for congestion, ear pain and sore throat  Eyes: Negative for redness, itching and visual disturbance  Respiratory: Negative for cough, shortness of breath and wheezing  Cardiovascular: Negative for chest pain, palpitations and leg swelling  Gastrointestinal: Negative for abdominal pain, constipation, diarrhea and nausea  Genitourinary: Negative for difficulty urinating, dysuria and hematuria     Musculoskeletal: Positive for arthralgias  Negative for back pain, joint swelling and myalgias  Skin: Negative for rash and wound  Allergic/Immunologic: Negative for environmental allergies  Neurological: Negative for dizziness, syncope, light-headedness and headaches  Hematological: Does not bruise/bleed easily  Psychiatric/Behavioral: Negative for confusion and sleep disturbance  The patient is not nervous/anxious            Past Medical History:   Diagnosis Date    Hyperlipidemia     Skin tag     last assessed 4/21/17, resolved 10/2/17          Current Outpatient Medications:     ALPRAZolam (XANAX) 0 5 mg tablet, Take 1 tablet (0 5 mg total) by mouth daily as needed (prn), Disp: 30 tablet, Rfl: 1    ascorbic acid (VITAMIN C) 500 mg tablet, Take 1 tablet (500 mg total) by mouth daily for 60 doses, Disp: 30 tablet, Rfl: 1    B Complex Vitamins (B COMPLEX PO), Take 1 capsule by mouth daily , Disp: , Rfl:     CALCIUM PO, Take 1 capsule by mouth daily , Disp: , Rfl:     Cholecalciferol (VITAMIN D3 PO), Take 1 capsule by mouth daily , Disp: , Rfl:     ferrous sulfate 324 (65 Fe) mg, Take 1 tablet (324 mg total) by mouth 2 (two) times a day before meals Take 1 pill BID, PRE-OP with Vit C, may be constipatory, Disp: 60 tablet, Rfl: 0    folic acid (FOLVITE) 1 mg tablet, Take 1 tablet (1 mg total) by mouth daily for 30 doses Take 1 pill po Qday PRE-OP, with Vit C, and Iron, Disp: 30 tablet, Rfl: 0    Ibuprofen-Diphenhydramine Cit 200-38 MG TABS, Take by mouth as needed, Disp: , Rfl:     sertraline (ZOLOFT) 100 mg tablet, Take 1 tablet (100 mg total) by mouth daily, Disp: 90 tablet, Rfl: 1    simvastatin (ZOCOR) 20 mg tablet, Take 1 tablet (20 mg total) by mouth daily at bedtime, Disp: 90 tablet, Rfl: 1    enoxaparin (LOVENOX) 40 mg/0 4 mL, Inject 0 4 mL (40 mg total) under the skin daily in the early morning for 28 days Provided preop to be used after the surgery  Do not start or use this medication prior to your operation (Patient not taking: Reported on 10/10/2019), Disp: 28 Syringe, Rfl: 0    Allergies   Allergen Reactions    Tetracyclines & Related      blisters       Social History   Past Surgical History:   Procedure Laterality Date    COLONOSCOPY      Complete, with Dr Meyers, resolved 7/11/13    KNEE ARTHROSCOPY Left     resolved 2003    KNEE ARTHROSCOPY W/ MENISCAL REPAIR Left 2000    TONSILLECTOMY AND ADENOIDECTOMY       Family History   Problem Relation Age of Onset    Stroke Mother         CVA    Hyperlipidemia Mother     Stroke Maternal Grandfather         CVA    Stroke Family         CVA    Other Family         breast disorders    Prostate cancer Father 76    Other Maternal Aunt         breast disorders        Objective:  /76 (BP Location: Left arm, Patient Position: Sitting, Cuff Size: Standard)   Pulse 82   Temp (!) 97 °F (36 1 °C) (Tympanic)   Ht 5' 4" (1 626 m)   Wt 82 1 kg (181 lb)   SpO2 95%   BMI 31 07 kg/m²        Physical Exam   Constitutional: She is oriented to person, place, and time  She appears well-developed and well-nourished  No distress  HENT:   Head: Normocephalic and atraumatic  Right Ear: External ear normal    Left Ear: External ear normal    Nose: Nose normal    Mouth/Throat: Oropharynx is clear and moist    Eyes: Pupils are equal, round, and reactive to light  Conjunctivae and EOM are normal    Neck: Normal range of motion  Neck supple  Cardiovascular: Normal rate, regular rhythm and intact distal pulses  No murmur heard  Systolic click   Pulmonary/Chest: Effort normal and breath sounds normal  No respiratory distress  She has no wheezes  She has no rales  Abdominal: Soft  Bowel sounds are normal  There is no tenderness  Musculoskeletal: Normal range of motion  She exhibits no edema or deformity  Lymphadenopathy:     She has no cervical adenopathy  Neurological: She is alert and oriented to person, place, and time  No cranial nerve deficit     Skin: Skin is warm and dry  No rash noted  She is not diaphoretic  Psychiatric: She has a normal mood and affect  Her behavior is normal  Judgment and thought content normal    Vitals reviewed  A-T Advancement Flap Text: The defect edges were debeveled with a #15 scalpel blade.  Given the location of the defect, shape of the defect and the proximity to free margins an A-T advancement flap was deemed most appropriate.  Using a sterile surgical marker, an appropriate advancement flap was drawn incorporating the defect and placing the expected incisions within the relaxed skin tension lines where possible.    The area thus outlined was incised deep to adipose tissue with a #15 scalpel blade.  The skin margins were undermined to an appropriate distance in all directions utilizing iris scissors.

## 2023-04-24 ENCOUNTER — EVALUATION (OUTPATIENT)
Dept: PHYSICAL THERAPY | Age: 78
End: 2023-04-24

## 2023-04-24 DIAGNOSIS — M17.11 PRIMARY OSTEOARTHRITIS OF RIGHT KNEE: Primary | ICD-10-CM

## 2023-04-24 DIAGNOSIS — S83.8X1D MENISCAL INJURY, RIGHT, SUBSEQUENT ENCOUNTER: ICD-10-CM

## 2023-04-24 NOTE — PROGRESS NOTES
PT Evaluation     Today's date: 2023  Patient name: Patricio Thrasher  : 1945  MRN: [id number]  Referring provider: Ignacio Brantley DO  Dx:   Encounter Diagnosis     ICD-10-CM    1  Primary osteoarthritis of right knee  M17 11 Ambulatory referral to Physical Therapy      2  Meniscal injury, right, subsequent encounter  S83 8X1D           Start Time: 5449  Stop Time: 80  Total time in clinic (min): 45 minutes    Assessment  Assessment details: Pt is a 79 y/o female who presents with R knee pain following traumatic injury  No further referral is necessary at this time  Pt has history of L TKA  Pt has a movement impairment diagnosis of decreased quad activation similar to a pathoantomical diagnosis of meniscal   Pt is experiencing pain, decreased strength, and decreased ROM  Pt has a fair prognosis based on episodes of buckling and AKIRA  Pt would benefit from PT to address these impairments leading to increased functional capacity and improved quality of life  Impairments: abnormal or restricted ROM, impaired physical strength, lacks appropriate home exercise program, pain with function, poor posture  and poor body mechanics  Understanding of Dx/Px/POC: good   Prognosis: good   Goals  Short Term Goals: to be achieved by 4 weeks  1) Patient to be independent with basic HEP  2) Decrease pain to 2/10 at its worst   3) Increase knee ROM by 5-10 degrees   4) Increase LE strength by 1/2 MMT grade in all deficient planes      Long Term Goals: to be achieved by discharge  1) FOTO equal to or greater than 65   2) Ambulation to improve to maximal level of function  3) Stair negotiation will improve to reciprocal   4) Sit to stand transfers will improve to maximal level of function    Plan  Patient would benefit from: skilled physical therapy  Planned modality interventions: cryotherapy and thermotherapy: hydrocollator packs  Planned therapy interventions: neuromuscular re-education, patient education, stretching, strengthening, therapeutic activities, therapeutic exercise, therapeutic training, home exercise program and graded activity  Frequency: Twice a week for 10 weeks  Treatment plan discussed with: patient         Subjective Evaluation     History of Present Illness  Mechanism of injury: Pt reports pain in right knee 2 weeks ago wheel barrel with 60 pounds of wood fell on her leg  Pt was evaluated in ED and given brace, evaluated but orthopedic (Dr Asya Snyder) on 23 and given CSI with moderate relief  Dr Asya Snyder suspects meniscal tear  Pt reports instability and giving way  Pain  Current pain ratin  At best pain ratin  At worst pain ratin  Quality: pulling, tight, throbbing, pressure and sharp  Aggravating factors: keyboarding, stair climbing, walking, standing and sitting     Hand dominance: right    Patient Goals  Patient goals for therapy: decreased pain, independence with ADLs/IADLs, return to sport/leisure activities, increased strength and increased motion              Objective      Tenderness      Right Knee   Tenderness in the patellar tendon       Active Range of Motion   Left Knee   Flexion: 135 degrees   Extension: 0 degrees      Right Knee   Flexion: 130 degrees with pain  Extension: -3 degrees      Strength/Myotome Testing      Right Hip   Planes of Motion   Flexion: 4-  Abduction: 4-     Right Knee   Flexion: 4  Extension: 4  Quadriceps contraction: fair    Additional Strength Details  Pain with stair descent  5 STS: 22 secs p!     Tests      Right Knee   Positive  Thessaly's test at 5 degrees, Thessaly's test at 20 degrees, [facility] and palpation to lateral joint line  Minimal degree of laxity valgus stress test at 0 degrees, valgus stress test at 30 degrees, varus stress test at 0 degrees and varus stress test at 30 degrees     Patient had 2-3 episode of knee giving way during functional transfers during     [facility] Most Recent Value   PT/OT G-Codes Current Score 49   Projected Score 67              Precautions: Hx TKA       Manuals 4/24                     Knee AP mobs                                                                                               Neuro Re-Ed                       Quad sets  HEP                     SLR                       Mini squats                       Step ups                        Bridges with glute activation  HEP                                                                     Ther Ex                       Knee flexion stair mobs  HEP                     Bike ROM                                                                                                                                                                       Ther Activity                                                                       Gait Training                                                                       Modalities

## 2023-04-25 ENCOUNTER — TELEPHONE (OUTPATIENT)
Dept: OBGYN CLINIC | Facility: HOSPITAL | Age: 78
End: 2023-04-25

## 2023-04-25 NOTE — TELEPHONE ENCOUNTER
Patient was given above information and verbalized understanding  Will go to PT and will call with any worsening of symptoms in the meantime

## 2023-04-25 NOTE — TELEPHONE ENCOUNTER
Caller: Patient    Doctor/Office: [facility]    Call regarding :  Missed call from nurse     Call was transferred to: nurse

## 2023-04-25 NOTE — TELEPHONE ENCOUNTER
Assessment  1  Chronic low back pain (724 2,338 29) (M54 5,G89 29)   2  Chronic myofascial pain (729 1,338 29) (M79 1,G89 29)   3  Lumbar radiculopathy, chronic (724 4) (M54 16)   4  Pain syndrome, chronic (338 4) (G89 4)   5  Spondylosis of lumbar region without myelopathy or radiculopathy (721 3) (M47 816)   6  Thoracic radiculitis (724 4) (M54 14)   7  Encounter for removal of staples (V58 32) (Z48 02)    Discussion/Summary    This is a 64 YO F who present for 2 week f/u visit s/p surgery with DR Estevan Robb on 10/6 2017and Technique:Removal of a thoracic spinal cord stimulator paddle electrode placed via T9-10 laminectomy Â Removal of a left buttock implantable pulse generator and Â   Findings:removed at its entry site  St  [employer] and [employer] IPGfor procedure;is a 63 -year -old female with a long-standing history of chronic pain involving the lower back and lower extremities  Previous placed SCS that delivers efficacy, but reports new thoracic radiculitis and requests removal    reports that she had efficacy form the SCS but the problem was the paddle causing pain in the thoracic region thet radiated around to the Left pleuritic region under the left rib cage area  Onset of pain in the immediate postoperative period and persisted , never resolved, Today she reports the same discomfort in he left pleuritic region  She denies lumbar pain the reason why she had SCS  denies dyspnea or hemoptysis, she presents in no acute distress  following instructions are reviewed in detail and continue thru next 4 week until follow-up visit with / Estevan Robb to observe incisions for redness, drainage, swelling dehiscence, increased pain, fever >/= 101, warmth to touch incision or skin surrounding, if occur call or report to office immediately for reassessment   not apply any lotions, creams, powder, or ointments to surgical incisionsas tolerated, no bending, lifting greater than 10 lbs, turning, or Caller: Patient    Doctor: Karo Lawrence    Reason for call: Patient wants a less conservative treatment plan  Patient plans to do PT exercises at home  Would possibly want to have surgery  Is an MRI possible?     Call back#: [phone] stretching, ambulation as tolerated  Immersion in water such as swimming, hot tub, or tub bath  of follow-up appointment in 4 week with Dr Taryn Brewer there is any significant change in her neurologic status, call and/or return to the office immediately for reassessment  expressed an understanding of information communicated during visit  reports she had her trigger thumb injected on Wednesday with steroid , Dr Katerine Monahan ok this procedure one week after surgery  asked when it is ok to take antiinflammatory medications again- she is 2 weeks post op ok top resume   The patient was counseled regarding instructions for management  The patient has the current Goals: Resolution of pain in left pleuritic pain   resolution of Low Back pain  Patient is able to Self-Care  Chief Complaint  2 week post op of scs removal      Review of Systems    Constitutional: No fever, no chills, feels well, no tiredness, no recent weight gain or weight loss  Eyes: No complaints of eye pain, no red eyes, no eyesight problems, no discharge, no dry eyes, no itching of eyes  ENT: no complaints of earache, no loss of hearing, no nose bleeds, no nasal discharge, no sore throat, no hoarseness  Cardiovascular: No complaints of slow heart rate, no fast heart rate, no chest pain, no palpitations, no leg claudication, no lower extremity edema  Respiratory: No complaints of shortness of breath, no wheezing, no cough, no SOB on exertion, no orthopnea, no PND  Gastrointestinal: No complaints of abdominal pain, no constipation, no nausea or vomiting, no diarrhea, no bloody stools  Genitourinary: No complaints of dysuria, no incontinence, no pelvic pain, no dysmenorrhea, no vaginal discharge or bleeding  Musculoskeletal: limb pain-- and-- bilateral leg pain worse on right, but-- no arthralgias,-- no joint swelling,-- no myalgias,-- no joint stiffness-- and-- no limb swelling     Integumentary: No complaints of skin rash or lesions, no itching, no skin wounds, no breast pain or lump  Neurological: headache,-- tingling,-- limb weakness-- and-- difficulty walking, but-- no numbness,-- no confusion,-- no dizziness,-- no convulsions-- and-- no fainting  Psychiatric: anxiety-- and-- depression  Endocrine: No complaints of proptosis, no hot flashes, no muscle weakness, no deepening of the voice, no feelings of weakness  Hematologic/Lymphatic: No complaints of swollen glands, no swollen glands in the neck, does not bleed easily, does not bruise easily  ROS reviewed  Active Problems  1  Acute post-operative pain (338 18) (G89 18)   2  Chronic left hip pain (804 43,013 94) (M25 552,G89 29)   3  Chronic low back pain (724 2,338 29) (M54 5,G89 29)   4  Chronic myofascial pain (729 1,338 29) (M79 1,G89 29)   5  Encounter for staple removal (V58 32) (Z48 02)   6  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)   7  Lumbar radiculopathy, chronic (724 4) (M54 16)   8  Pain syndrome, chronic (338 4) (G89 4)   9  Postop check (V67 00) (Z09)   10  Preop examination (V72 84) (Z01 818)   11  Pre-procedural examination (V72 84) (Z01 818)   12  Pre-procedure lab exam (V72 63) (Z01 812)   13  Spondylosis of lumbar region without myelopathy or radiculopathy (721 3) (M47 816)   14  Status post surgery (V45 89) (Z98 890)   15  Thoracic radiculitis (724 4) (M54 14)   16  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)    Social History   · Marital History - Currently    · Never A Smoker   · Never Drank Alcohol    Current Meds   1  Cephalexin 500 MG Oral Capsule; TAKE 1 CAPSULE EVERY 6 HOURS DAILY; Therapy: 32NCQ1980 to (Evaluate:12Oct2017)  Requested for: 05Oct2017; Last   Rx:05Oct2017 Ordered   2  Estradiol 0 1 MG/24HR Transdermal Patch Weekly; Therapy: (Recorded:02Ggo4268) to Recorded   3  Gabapentin 300 MG Oral Capsule Recorded   4   Lidocaine 5 % External Patch; APPLY 1 PATCH TO THE AFFECTED AREA AND LEAVE IN   PLACE FOR 12 HOURS, THEN REMOVE AND LEAVE OFF FOR 12 HOURS; Therapy: 34Vsx7441 to (Evaluate:07Oct2017)  Requested for: 29Ymx1787; Last   Rx:08Aug2017 Ordered   5  Methocarbamol 750 MG Oral Tablet; TAKE 1 TABLET Every 6 hours PRN For muscle   spasms; Therapy: 14HHT2778 to (Evaluate:03Mar2017)  Requested for: 16Jrx7558; Last   Rx:48Cve4085 Ordered   6  Simvastatin 20 MG Oral Tablet Recorded   7  TraMADol HCl - 50 MG Oral Tablet; Take one tablet every six hours as needed for pain; Therapy: 26SJR9548 to (Last Rx:05Oct2017) Ordered   8  Zoloft 50 MG Oral Tablet; TAKE 1 5 TABLET Bedtime; Therapy: (Recorded:97Ios3084) to Recorded    Allergies  1  Demerol TABS   2  TEGretol TABS    Vitals   Recorded: 78GZC5362 08:39AM   Temperature 98 F, Tympanic   Heart Rate 98, L Brachial Artery   Pulse Quality Normal, L Brachial Artery   Respiration Quality Normal   Systolic 226, LUE, Sitting   Diastolic 50, LUE, Sitting   Height 5 ft 4 in   Weight 126 lb    BMI Calculated 21 63   BSA Calculated 1 61     Physical Exam     Respiratory Respiratory effort: Normal  -- easy non labored n acute distress  -- Auscultation of lungs: Clear to auscultation  No rales, rhonchi, wheezes appreciated over the lungs bilaterally  -- clear no rales, rhonchi, wheezing  Cardiovascular Auscultation of heart: Rhythm is regular, no gallop or rubs  No heart murmur appreciated  -- RRR no murmers  Carotid pulses: Normal, 2+ bilaterally  -- 2 + equal bilaterally  -- Peripheral vascular exam: Pedal Pulses: 2+ and equal bilaterally  Radial pulses: 2+ and equal bilaterally  -- 2 + radial 2 + pedal equal bilaterally  -- Extremities: Peripheral circulation is normal  -- no peripheral edema  Abdomen Soft, nontender, and nondistended without guarding, rigidity or rebound tenderness  -- soft non distended no rebound tenderness  -- deferred  Musculo: Spine Contour is normal  No tenderness of the spine billaterally  no tenderness with bilatera  l palpation  Skin warm and dry  -- cool dry intact    Neurologic - Mental Status: Alert and Oriented x3  -- Mood and affect: Affect is normal  -- Memory is grossly intact  -- Attention is WNL  Kurtis Balint -- Speech: Language is fluent  -- Knowledge and vocabulary consistent with education  -- Grossly nonfocal  -- Judgment and insight: Normal     Cranial Nerve Exam:  1st cranial nerve: Normal  -- 3rd, 4th, and 6th cranial nerves: Intact  -- 5th cranial nerve: Intact  -- 7th cranial nerve: Intact  -- 9th and 10th cranial nerves: Intact  -- 11th cranial nerve: Intact  -- 12th cranial nerve: Intact  Motor System General Motor Strength: No pronator drift and no parietal drift  -- no pronator drift  Motor System - Upper Extremities: Muscle strength: 5/5 bilaterally  -- 5/5 -- Muscle tone: Normal bilaterally  Motor System - Lower Extremities: Muscle strength: 5/5 bilaterally  -- 5/5 -- Muscle tone: Normal bilaterally  Sensory: Sensation grossly intact to light touch  Sensation grossly intact to pinprick  -- light touch  Gait and Station: Jannette with a normal gait  Procedure    Wound Exam:   Procedure Note: 10 staples left iliac crest buttock , 8 staples thoracic region remobed both incision dry intact , no erythemia, no signs of infection , no dehiscience  staples were removed  Patient Status:  the patient tolerated the procedure well  Complications:  there were no complications  Future Appointments    Date/Time Provider Specialty Site   11/20/2017 01:00 PM DARRELL Nieves   Neurosurgery [address]     Signatures   Electronically signed by : Shubham Sullivan; Oct 20 2017 10:22AM EST                       (Author)    Electronically signed by : Shubham Sullivan; Oct 20 2017 10:35AM EST                       (Author)    Electronically signed by : DARRELL Hernandez ; Oct 24 2017  8:13AM EST                       (Author)

## 2023-05-02 ENCOUNTER — OFFICE VISIT (OUTPATIENT)
Dept: PHYSICAL THERAPY | Age: 78
End: 2023-05-02

## 2023-05-02 DIAGNOSIS — S83.8X1D MENISCAL INJURY, RIGHT, SUBSEQUENT ENCOUNTER: ICD-10-CM

## 2023-05-02 DIAGNOSIS — M17.11 PRIMARY OSTEOARTHRITIS OF RIGHT KNEE: Primary | ICD-10-CM

## 2023-05-02 NOTE — PROGRESS NOTES
"Daily Note     Today's date: 2023  Patient name: Dylan Meraz  : 1945  MRN: [id number]  Referring provider: Tammie Stearns DO  Dx:   Encounter Diagnosis     ICD-10-CM    1  Primary osteoarthritis of right knee  M17 11       2  Meniscal injury, right, subsequent encounter  S83 8X1D           Start Time: 0800  Stop Time: 4423  Total time in clinic (min): 55 minutes    Subjective: Pt reports continued buckling and giving way  Objective: See treatment diary below      Assessment: Tolerated treatment well  Pt's POC was progressed to include more closed chain functional strengthening  Pt educated on prognosis if ligamentous damage is present, advised to consult with surgeon if that is the diagnosis  Patient demonstrated fatigue post treatment and would benefit from continued PT      Plan: Continue per plan of care        Precautions: Hx TKA       Manuals                    Knee AP mobs    JF                                                                                           Neuro Re-Ed                       Quad sets  HEP 20*5\"                   SLR    2*10                   Mini squats    10 p!                   Step ups     nv                   Bridges with glute activation  HEP  3*10                   Leg press    3*10 95#                                           Ther Ex                       Knee flexion stair mobs  HEP                     Bike ROM    10'                                                                                                                                                                   Ther Activity                                                                       Gait Training                                                                       Modalities                                                                                "

## 2023-05-09 ENCOUNTER — OFFICE VISIT (OUTPATIENT)
Dept: PHYSICAL THERAPY | Age: 78
End: 2023-05-09

## 2023-05-09 DIAGNOSIS — M17.11 PRIMARY OSTEOARTHRITIS OF RIGHT KNEE: Primary | ICD-10-CM

## 2023-05-09 DIAGNOSIS — S83.8X1D MENISCAL INJURY, RIGHT, SUBSEQUENT ENCOUNTER: ICD-10-CM

## 2023-05-09 NOTE — PROGRESS NOTES
"Daily Note     Today's date: 2023  Patient name: Miguel Power  : 1945  MRN: [id number]  Referring provider: Trent Burleson DO  Dx:   Encounter Diagnosis     ICD-10-CM    1  Primary osteoarthritis of right knee  M17 11       2  Meniscal injury, right, subsequent encounter  S83 8X1D           Start Time: 1100  Stop Time: 1145  Total time in clinic (min): 45 minutes    Subjective: Pt reports improvements with community ambulation  Objective: See treatment diary below      Assessment: Tolerated treatment well  Pt demonstrates improvements with closed chain interventions  Patient demonstrated fatigue post treatment and would benefit from continued PT      Plan: Continue per plan of care        Precautions: Hx TKA       Manuals   5  5/                 Knee AP mobs    JF  JF                                                                                         Neuro Re-Ed                       Quad sets  HEP 20*5\"  20*5\"                 SLR    2*10  3*10                 Mini squats    10 p!  20x2 brace                 Step ups     nv  3/10                 Bridges with glute activation  HEP  3*10                   Leg press    3*10 95#  3*20 85#                 Step-ups   3*10           Standing hip abd      20x2                 Ther Ex                       Knee flexion stair mobs  HEP                     Bike ROM    10'  10'                                                                                                                                                                 Ther Activity                                                                       Gait Training                                                                       Modalities                                                                                  "

## 2023-05-16 ENCOUNTER — OFFICE VISIT (OUTPATIENT)
Dept: PHYSICAL THERAPY | Age: 78
End: 2023-05-16

## 2023-05-16 DIAGNOSIS — M17.11 PRIMARY OSTEOARTHRITIS OF RIGHT KNEE: Primary | ICD-10-CM

## 2023-05-16 DIAGNOSIS — S83.8X1D MENISCAL INJURY, RIGHT, SUBSEQUENT ENCOUNTER: ICD-10-CM

## 2023-05-16 NOTE — PROGRESS NOTES
"Daily Note     Today's date: 2023  Patient name: Charles Shah  : 1945  MRN: [id number]  Referring provider: Christine Bhatti DO  Dx:   Encounter Diagnosis     ICD-10-CM    1  Primary osteoarthritis of right knee  M17 11       2  Meniscal injury, right, subsequent encounter  S83 8X1D           Start Time: 4153  Stop Time: 1100  Total time in clinic (min): 45 minutes    Subjective: Pt reports no new symptoms in knee, able to stand long periods of time with UE support but still difficult to perform stair navigation  Objective: See treatment diary below      Assessment: Tolerated treatment well  HEP progressed to include more hip abductor strengthening interventions  Difficult to advance closed chain interventions due to knee giving way  Patient demonstrated fatigue post treatment and would benefit from continued PT      Plan: Continue per plan of care        Precautions: Hx TKA       Manuals                Knee AP mobs    JF  JF  JF                                                                                       Neuro Re-Ed                       Quad sets  HEP 20*5\"  20*5\"  20*5\"               SLR    2*10  3*10  3*10 supine and SL               Mini squats    10 p!  20x2 brace                 Step ups     nv  3/10                 Bridges with glute activation  HEP  3*10    3*10               Leg press    3*10 95#  3*20 85#                 Step-ups   3*10 3*10          Standing hip abd      20x2  20*2               Ther Ex                       Knee flexion stair mobs  HEP                     Bike ROM    10'  10'  10'                                                                                                                                                               Ther Activity                                                                       Gait Training                                                                       Modalities                         "

## 2023-05-23 ENCOUNTER — OFFICE VISIT (OUTPATIENT)
Dept: PHYSICAL THERAPY | Age: 78
End: 2023-05-23

## 2023-05-23 DIAGNOSIS — S83.8X1D MENISCAL INJURY, RIGHT, SUBSEQUENT ENCOUNTER: ICD-10-CM

## 2023-05-23 DIAGNOSIS — M17.11 PRIMARY OSTEOARTHRITIS OF RIGHT KNEE: Primary | ICD-10-CM

## 2023-05-23 NOTE — PROGRESS NOTES
"Daily Note     Today's date: 2023  Patient name: Domenico Villatoro  : 1945  MRN: [id number]  Referring provider: Meliza Muñiz DO  Dx:   Encounter Diagnosis     ICD-10-CM    1  Primary osteoarthritis of right knee  M17 11       2  Meniscal injury, right, subsequent encounter  S83 8X1D           Start Time: 0800  Stop Time: 0845  Total time in clinic (min): 45 minutes    Subjective: Pt reports improvements ability to perform yard work but is still \"shaky\" with her balance  Objective: See treatment diary below      Assessment: Tolerated treatment well  POC progressed to include stair training to improve functional mobility  Patient demonstrated fatigue post treatment and would benefit from continued PT      Plan: Continue per plan of care        Precautions: Hx TKA       Manuals              Knee AP mobs    JF  JF  JF  JF                                                                                     Neuro Re-Ed                       Quad sets  HEP 20*5\"  20*5\"  20*5\"               SLR    2*10  3*10  3*10 supine and SL  3*10 supine and SL 2#             Mini squats    10 p!  20x2 brace                 Step ups     nv  3/10                 Bridges with glute activation  HEP  3*10    3*10  3*10             Leg press    3*10 95#  3*20 85#                 Step-ups   3*10 3*10 3*10         Standing hip abd      20x2  20*2               Ther Ex                       Knee flexion stair mobs  HEP                     Bike ROM    10'  10'  10'  10'                                                                                                                                                             Ther Activity                        Stair training          5'                                     Gait Training                                                                       Modalities                                                                                      "

## 2023-05-28 ENCOUNTER — RA CDI HCC (OUTPATIENT)
Dept: OTHER | Facility: HOSPITAL | Age: 78
End: 2023-05-28

## 2023-05-28 NOTE — PROGRESS NOTES
[facility]  coding opportunities       Chart reviewed, no opportunity found:   [address]        Patients Insurance     Medicare Insurance: [employer]

## 2023-06-01 ENCOUNTER — OFFICE VISIT (OUTPATIENT)
Dept: PHYSICAL THERAPY | Age: 78
End: 2023-06-01

## 2023-06-01 DIAGNOSIS — M17.11 PRIMARY OSTEOARTHRITIS OF RIGHT KNEE: Primary | ICD-10-CM

## 2023-06-01 DIAGNOSIS — S83.8X1D MENISCAL INJURY, RIGHT, SUBSEQUENT ENCOUNTER: ICD-10-CM

## 2023-06-01 NOTE — PROGRESS NOTES
PT Discharge Summary    Today's date: 2023  Patient name: Lakeisha Peña  : 1945  MRN: [id number]  Referring provider: Thaddeus Chacko DO  Dx:   Encounter Diagnosis     ICD-10-CM    1  Primary osteoarthritis of right knee  M17 11 Ambulatory referral to Physical Therapy      2  Meniscal injury, right, subsequent encounter  [id number]           Start Time:   Stop Time: 80  Total time in clinic (min): 45 minutes    Assessment  Assessment details: Pt is a 77 y/o female who presents with R knee pain following traumatic injury  Pt has demonstrates functional improvements and is less dependent on AD and braces, however, she still has episodes of giving way when performing squats and stair navigation  Given the traumatic AKIRA, patient was encouraged to return to orthopedic team to discuss other treatment options moving forward if she does not reach her prior level of function  Special testing was not performed to avoid aggravation  Pt was educated that she may continue to see functional improvements over the next couple months but it is important to be cautious when performing more strenuous tasks  Pt will be discharged from formal therapy but would be welcomed back if orthopedic team feels continued therapy is part of best treatment option  Pt has a fair prognosis based on episodes of buckling and AKIRA  Pt would benefit from PT to address these impairments leading to increased functional capacity and improved quality of life  Impairments: abnormal or restricted ROM, impaired physical strength, lacks appropriate home exercise program, pain with function, poor posture  and poor body mechanics    Understanding of Dx/Px/POC: good   Prognosis: good     Goals - Partially met  Short Term Goals: to be achieved by 4 weeks  1) Patient to be independent with basic HEP    2) Decrease pain to 2/10 at its worst   3) Increase knee ROM by 5-10 degrees   4) Increase LE strength by 1/2 MMT grade in all deficient "planes      Long Term Goals: to be achieved by discharge  1) FOTO equal to or greater than 65   2) Ambulation to improve to maximal level of function  3) Stair negotiation will improve to reciprocal   4) Sit to stand transfers will improve to maximal level of function      Plan  Discharge PT with HEP           Active Range of Motion   Left Knee   Flexion: 135 degrees   Extension: 0 degrees      Right Knee   Flexion: 130 degrees with pain  Extension: -3 degrees      Strength/Myotome Testing      Right Hip   Planes of Motion   Flexion: 4+  Abduction: 4+     Right Knee   Flexion: 4+  Extension: 4+  Quadriceps contraction: fair      Flowsheet Rows    Flowsheet Row Most Recent Value   PT/OT G-Codes    Current Score 56   Projected Score 67              Precautions: Hx TKA        Manuals 4/24 5/2 5/9 5/23 6/1             Knee AP mobs    JF  JF  JF  JF                                                                                     Neuro Re-Ed                       Quad sets  HEP 20*5\"  20*5\"  20*5\"               SLR    2*10  3*10  3*10 supine and SL  3*10 supine and SL 2#             Mini squats    10 p!  20x2 brace                 Step ups     nv  3/10                 Bridges with glute activation  HEP  3*10    3*10  3*10             Leg press    3*10 95#  3*20 85#                 Step-ups   3*10 3*10 3*10         Standing hip abd      20x2  20*2               Ther Ex                       Knee flexion stair mobs  HEP                     Bike ROM    10'  10'  10'  10'                                                                                                                                                             Ther Activity                        Stair training          5'                                     Gait Training                                                                       Modalities                                                                                      "

## 2023-06-08 DIAGNOSIS — E78.5 HYPERLIPIDEMIA, UNSPECIFIED HYPERLIPIDEMIA TYPE: ICD-10-CM

## 2023-06-08 DIAGNOSIS — F41.9 ANXIETY: ICD-10-CM

## 2023-06-08 DIAGNOSIS — G56.03 BILATERAL CARPAL TUNNEL SYNDROME: ICD-10-CM

## 2023-06-08 RX ORDER — SERTRALINE HYDROCHLORIDE 100 MG/1
100 TABLET, FILM COATED ORAL
Qty: 90 TABLET | Refills: 1 | Status: SHIPPED | OUTPATIENT
Start: 2023-06-08

## 2023-06-08 RX ORDER — SIMVASTATIN 20 MG
20 TABLET ORAL
Qty: 90 TABLET | Refills: 1 | Status: SHIPPED | OUTPATIENT
Start: 2023-06-08

## 2023-06-08 RX ORDER — GABAPENTIN 300 MG/1
300 CAPSULE ORAL
Qty: 90 CAPSULE | Refills: 1 | Status: SHIPPED | OUTPATIENT
Start: 2023-06-08

## 2023-06-12 ENCOUNTER — OFFICE VISIT (OUTPATIENT)
Dept: INTERNAL MEDICINE CLINIC | Facility: OTHER | Age: 78
End: 2023-06-12
Payer: COMMERCIAL

## 2023-06-12 VITALS
BODY MASS INDEX: 30.9 KG/M2 | TEMPERATURE: 97.2 F | DIASTOLIC BLOOD PRESSURE: 80 MMHG | WEIGHT: 181 LBS | HEART RATE: 90 BPM | OXYGEN SATURATION: 95 % | SYSTOLIC BLOOD PRESSURE: 126 MMHG | HEIGHT: 64 IN

## 2023-06-12 DIAGNOSIS — F32.89 OTHER DEPRESSION: ICD-10-CM

## 2023-06-12 DIAGNOSIS — F41.9 ANXIETY: ICD-10-CM

## 2023-06-12 DIAGNOSIS — K21.9 GASTROESOPHAGEAL REFLUX DISEASE WITHOUT ESOPHAGITIS: ICD-10-CM

## 2023-06-12 DIAGNOSIS — Z12.31 SCREENING MAMMOGRAM, ENCOUNTER FOR: ICD-10-CM

## 2023-06-12 DIAGNOSIS — E78.00 HYPERCHOLESTEROLEMIA: ICD-10-CM

## 2023-06-12 DIAGNOSIS — R73.09 ABNORMAL GLUCOSE: ICD-10-CM

## 2023-06-12 DIAGNOSIS — M85.89 OSTEOPENIA OF MULTIPLE SITES: Primary | ICD-10-CM

## 2023-06-12 RX ORDER — PANTOPRAZOLE SODIUM 40 MG/1
40 TABLET, DELAYED RELEASE ORAL DAILY PRN
Qty: 90 TABLET | Refills: 1 | Status: SHIPPED | OUTPATIENT
Start: 2023-06-12

## 2023-06-12 RX ORDER — ALPRAZOLAM 0.5 MG/1
0.5 TABLET ORAL DAILY PRN
Qty: 30 TABLET | Refills: 1 | Status: SHIPPED | OUTPATIENT
Start: 2023-06-12

## 2023-06-12 NOTE — PROGRESS NOTES
Assessment/Plan:    1  Osteopenia of multiple sites  -     Vitamin D 25 hydroxy; Future    2  Gastroesophageal reflux disease without esophagitis  -     pantoprazole (PROTONIX) 40 mg tablet; Take 1 tablet (40 mg total) by mouth daily as needed (prn)    3  Anxiety  -     ALPRAZolam (XANAX) 0 5 mg tablet; Take 1 tablet (0 5 mg total) by mouth daily as needed (prn)    4  Other depression  -     CBC and differential; Future    5  Hypercholesterolemia  -     Lipid Panel with Direct LDL reflex; Future    6  Abnormal glucose  -     Hemoglobin A1C; Future  -     Comprehensive metabolic panel; Future  -     CBC and differential; Future    7  Screening mammogram, encounter for  -     Mammo screening bilateral w 3d & cad; Future; Expected date: 10/01/2023      BMI Counseling: Body mass index is 31 07 kg/m²  The BMI is above normal  Nutrition recommendations include moderation in carbohydrate intake  Exercise recommendations include exercising 3-5 times per week  No pharmacotherapy was ordered  Rationale for BMI follow-up plan is due to patient being overweight or obese  There are no Patient Instructions on file for this visit  Return in about 6 months (around 12/12/2023) for Recheck  Subjective:      Patient ID: Bethany Waller is a 78 y o  female  Chief Complaint   Patient presents with   • Follow-up     6 month follow up  No labs done  1 month ago a wheel barrel fell on right knee seeing orthopedics   Gyn and CRC due- wants to discuss these with Dr Annie Mendes  Appt for bone density is scheduled       HPI       Hyperlipidemia (Follow-Up): The patient states her hyperlipidemia has been under good control since the last visit  She has no comorbid illnesses  Symptoms: denies chest pain,-denies intermittent leg claudication,-denies muscle pain-and-denies muscle weakness  Associated symptoms include no focal neurologic deficits-and-no memory loss     Medications: the patient is adherent with her medication regimen -the patient complains of medication side effects  The patient is doing well with her hyperlipidemia goals  the patient's LDL goal is <100 mg/dL  -the patient's last LDL was 72 mg/dL  -(5/2014)   February 2019, due for labs and has lab slip  August 2019, very well controlled  March 2020:  Not able to get labs done since they are closed right now however compliant with the medication and does not need refills  November 2021, had lab work done in September   Nicely controlled levels   No issueson simvastatin 20 mg daily and levels are well controlled   No side effects   DEc 2022: well controlled; no issues  June 2023, well controlled, no issues, remains very active     Gastroesophageal Reflux Disease (Follow-Up): The patient is being seen for follow-up of gastroesophageal reflux disease   Is essentially resolved symptoms   Not on anything on a regular basis  March 2020, well controlled November 2021, controlled   Not on daily PPI  DEc 2022: stable   June 2023, no issues, as needed pantoprazole if necessary     Depression (Follow-Up): The patient states her depression has been stable since the last visit  She has no comorbid illnesses  Interval Events: doing well but  recently dx with lung cancer after brain surgery and colonoscopy  She has had no significant interval events  Interval Symptoms: improved depression,-improved depressed mood,-improved loss of interest or pleasure in activities,-stable insomnia,-denies feelings of worthlessness,-denies feelings of guilt,-denies trouble concentrating,-improved anxiety-and-denies sexual dysfunction   February 2019,  passed away in the winter 2018 but she has a very large support system and is doing relatively well   Taking it day by day  Associated symptoms include: no recent weight gain-and-no thoughts of suicide  Social Support: the patient has good social support     Medications: the patient is adherent with her medication regimen -She denies medication side effects   Pt has been on zoloft >5 years  Janeen Battles she feels really stable on it and does not feel the need to go off of it August 2019, doing very well and does not require any medication changes  March 2020, doing very well with present dose Zoloft with no breakthrough symptoms   No suicidal or homicidal ideations   April 2021, no HI or SI and tolerating medication well   Symptoms are well controlled with no flare ups   Does not need a change in dose    November 2021, no HI or SI   Doing well with Zoloft   No breakthrough symptoms    June 2023, well controlled off remains on Zoloft, does not want dose change, infrequently uses Xanax, does a lot of yard work     Osteopenia of multiple sites   Takes vitamin-D and calcium on a regular basis and is very active   Up-to-date on bone density study          The following portions of the patient's history were reviewed and updated as appropriate: allergies, current medications, past family history, past medical history, past social history, past surgical history and problem list     Review of Systems        Constitutional:  Denies fever or chills   Eyes:  Denies double , blurry vision or eye pain  HENT:  Denies nasal congestion, sore throat or new hearing issues  Respiratory:  Denies cough or shortness of breath or wheezing  Cardiovascular:  Denies palpitations or chest pain  GI:  Denies abdominal pain, nausea, or vomiting, no loose stools, no reflux  Integument:  Denies rash , no open areas  Neurologic:  Denies headache or focal weakness, no dizziness  : no dysuria, or hematuria      Current Outpatient Medications   Medication Sig Dispense Refill   • ALPRAZolam (XANAX) 0 5 mg tablet Take 1 tablet (0 5 mg total) by mouth daily as needed (prn) 30 tablet 1   • B Complex Vitamins (B COMPLEX PO) Take 1 capsule by mouth daily      • CALCIUM PO Take 1,200 mg by mouth daily      • Cholecalciferol (VITAMIN D3 PO) Take 1 capsule by mouth daily      • gabapentin (NEURONTIN) 300 mg "capsule Take 1 capsule (300 mg total) by mouth daily at bedtime 90 capsule 1   • pantoprazole (PROTONIX) 40 mg tablet Take 1 tablet (40 mg total) by mouth daily as needed (prn) 90 tablet 1   • sertraline (ZOLOFT) 100 mg tablet Take 1 tablet (100 mg total) by mouth daily at bedtime 90 tablet 1   • simvastatin (ZOCOR) 20 mg tablet Take 1 tablet (20 mg total) by mouth daily at bedtime 90 tablet 1     No current facility-administered medications for this visit         Objective:    /80 (BP Location: Left arm, Patient Position: Sitting, Cuff Size: Adult)   Pulse 90   Temp (!) 97 2 °F (36 2 °C) (Temporal)   Ht 5' 4\" (1 626 m)   Wt 82 1 kg (181 lb)   SpO2 95%   BMI 31 07 kg/m²        Physical Exam       Constitutional:  Well developed, well nourished, no acute distress, non-toxic appearance   Eyes:  PERRL, conjunctiva normal , non icteric sclera  HENT:  Atraumatic, oropharynx moist  Neck-  supple   Respiratory:  CTA b/l, normal breath sounds, no rales, no wheezing   Cardiovascular:  RRR, no murmurs, no LE edema b/l  GI:  Soft, nondistended, normal bowel sounds x 4, nontender, no organomegaly, no mass, no rebound, no guarding   Neurologic:  no focal deficits noted   Psychiatric:  Speech and behavior appropriate , AAO x 3        Wil Bailey, DO  "

## 2023-07-11 ENCOUNTER — HOSPITAL ENCOUNTER (OUTPATIENT)
Dept: RADIOLOGY | Facility: IMAGING CENTER | Age: 78
Discharge: HOME | End: 2023-07-11
Payer: COMMERCIAL

## 2023-07-11 DIAGNOSIS — M85.89 OSTEOPENIA OF MULTIPLE SITES: ICD-10-CM

## 2023-07-11 PROCEDURE — 77080 DXA BONE DENSITY AXIAL: CPT

## 2023-08-08 ENCOUNTER — HOSPITAL ENCOUNTER (OUTPATIENT)
Dept: RADIOLOGY | Facility: HOSPITAL | Age: 78
Discharge: HOME | End: 2023-08-08
Attending: ORTHOPAEDIC SURGERY
Payer: COMMERCIAL

## 2023-08-08 ENCOUNTER — OFFICE VISIT (OUTPATIENT)
Dept: OBGYN CLINIC | Facility: HOSPITAL | Age: 78
End: 2023-08-08
Payer: COMMERCIAL

## 2023-08-08 VITALS
BODY MASS INDEX: 30.39 KG/M2 | WEIGHT: 178 LBS | SYSTOLIC BLOOD PRESSURE: 123 MMHG | DIASTOLIC BLOOD PRESSURE: 88 MMHG | HEIGHT: 64 IN | HEART RATE: 89 BPM

## 2023-08-08 DIAGNOSIS — M25.561 ACUTE PAIN OF RIGHT KNEE: ICD-10-CM

## 2023-08-08 DIAGNOSIS — M17.11 PRIMARY OSTEOARTHRITIS OF RIGHT KNEE: Primary | ICD-10-CM

## 2023-08-08 PROCEDURE — 99213 OFFICE O/P EST LOW 20 MIN: CPT | Performed by: ORTHOPAEDIC SURGERY

## 2023-08-08 PROCEDURE — 73560 X-RAY EXAM OF KNEE 1 OR 2: CPT

## 2023-08-08 NOTE — PROGRESS NOTES
Assessment:   Diagnosis ICD-10-CM Associated Orders   1. Primary osteoarthritis of right knee  M17.11 Injection Procedure Prior Authorization      2. Acute pain of right knee  M25.561 XR knee 1 or 2 vw right     Injection Procedure Prior Authorization          Plan:  • Diagnostics reviewed and physical exam performed. Diagnosis, treatment options and associated risks were discussed with the patient including no treatment, nonsurgical treatment and potential for surgical intervention. The patient was given the opportunity to ask questions regarding each. She was counseled on weight loss measures as well as general wellness. • Quality of life decision to pursue elective right total knee arthroplasty as the amount of OA she has on her radiographs today are beyond a preservable knee with an arthroscopy. • Since she has had a cortisone injection with minimal lasting relief and not yet had viscosupplementation we will petition her insurance for viscosupplementation product in a 1-shot series for her right knee upon eligibility. • She may wear her hinged knee brace for comfort as needed. • Weightbearing and activities as tolerated. To do next visit:  Return for re-check for visco to her right knee. The above stated was discussed in layman's terms and the patient expressed understanding. All questions were answered to the patient's satisfaction. Scribe Attestation    I,:  Nubia Harvey am acting as a scribe while in the presence of the attending physician.:       I,:  Christophe Monzon MD personally performed the services described in this documentation    as scribed in my presence.:             Subjective:   Ely Lambert is a 70 y.o. female who presents today for evaluation of her right knee due to pain. Back in April she was pushing a wheelbarrow full of wood and she went to dump the [place] and it fell onto her right knee causing a twisting event resulting in pain and swelling.   She was seen through care now where x-rays were taken, nonweightbearing, and placed into a knee immobilizer. She saw Dr. Chris Patel in follow-up who administered an injection of cortisone. The injection of cortisone did provide some very mild short-term improvement of her symptoms. She has increased knee pain with prolonged weightbearing activities. She has increased pain with planting and twisting activities. She describes her knee wants to buckle underneath her at times. She feels about 50-60% better today than compared to immediately following her injury in April. She has a history of left total knee arthroplasty from 2019 and doing well. She does manage several properties. Pain scale today 6/10 but at times can be 8-9/10. She has tried a knee immobilizer as well as a hinged knee brace in conjunction with formal physical therapy.      Review of systems negative unless otherwise specified in HPI  Review of Systems    Past Medical History:   Diagnosis Date   • Hyperlipidemia    • Skin tag     last assessed 4/21/17, resolved 10/2/17        Past Surgical History:   Procedure Laterality Date   • COLONOSCOPY     • KNEE ARTHROSCOPY Left    • KNEE ARTHROSCOPY W/ MENISCAL REPAIR Left 2000   • [place] ARTHRP KNE CONDYLE&PLATU MEDIAL&LAT COMPARTMENTS Left 10/24/2019    Procedure: ARTHROPLASTY KNEE TOTAL;  Surgeon: Hattie Mixon MD;  Location: [facility];  Service: Orthopedics   • TONSILLECTOMY AND ADENOIDECTOMY         Family History   Problem Relation Age of Onset   • Stroke Mother         CVA   • Hyperlipidemia Mother    • Stroke Maternal Grandfather         CVA   • Stroke Family         CVA   • Other Family         breast disorders   • Prostate cancer Father 76   • Other Maternal Aunt         breast disorders    • No Known Problems Sister    • No Known Problems Daughter    • No Known Problems Maternal Grandmother    • No Known Problems Paternal Grandmother    • No Known Problems Paternal Grandfather    • No Known Problems Daughter    • No Known Problems Son    • Breast cancer Maternal Aunt 61   • No Known Problems Maternal Aunt    • No Known Problems Maternal Aunt    • No Known Problems Maternal Aunt    • No Known Problems Maternal Aunt    • No Known Problems Paternal Aunt    • No Known Problems Paternal Aunt        Social History     Occupational History   • Occupation: Caterer   Tobacco Use   • Smoking status: Former     Packs/day: 0.50     Years: 17.00     Total pack years: 8.50     Types: Cigarettes     Start date:      Quit date:      Years since quittin.6   • Smokeless tobacco: Never   Vaping Use   • Vaping Use: Never used   Substance and Sexual Activity   • Alcohol use: Yes     Comment: socially 0-2 weekly. • Drug use: No   • Sexual activity: Not Currently         Current Outpatient Medications:   •  ALPRAZolam (XANAX) 0.5 mg tablet, Take 1 tablet (0.5 mg total) by mouth daily as needed (prn), Disp: 30 tablet, Rfl: 1  •  B Complex Vitamins (B COMPLEX PO), Take 1 capsule by mouth daily , Disp: , Rfl:   •  CALCIUM PO, Take 1,200 mg by mouth daily , Disp: , Rfl:   •  Cholecalciferol (VITAMIN D3 PO), Take 1 capsule by mouth daily , Disp: , Rfl:   •  gabapentin (NEURONTIN) 300 mg capsule, Take 1 capsule (300 mg total) by mouth daily at bedtime, Disp: 90 capsule, Rfl: 1  •  pantoprazole (PROTONIX) 40 mg tablet, Take 1 tablet (40 mg total) by mouth daily as needed (prn), Disp: 90 tablet, Rfl: 1  •  sertraline (ZOLOFT) 100 mg tablet, Take 1 tablet (100 mg total) by mouth daily at bedtime, Disp: 90 tablet, Rfl: 1  •  simvastatin (ZOCOR) 20 mg tablet, Take 1 tablet (20 mg total) by mouth daily at bedtime, Disp: 90 tablet, Rfl: 1    Allergies   Allergen Reactions   • Tetracyclines & Related      Blisters, yeast infection            Vitals:    23 1453   BP: 123/88   Pulse: 89       Objective:                    Right Knee Exam     Muscle Strength   The patient has normal right knee strength.     Tenderness   The patient is experiencing tenderness in the lateral joint line and medial joint line. Range of Motion   Extension: 0   Flexion: 120 (Mild crepitation and stiffness of full flexion)     Other   Erythema: absent  Swelling: mild    Comments:    Intact extensor mechanism. Mild valgus alignment  Stable to varus and valgus stressing            Diagnostics, reviewed and taken today if performed as documented: The attending physician has personally reviewed the pertinent films in PACS and interpretation is as follows:    Right knee x-rays taken and reviewed in the office today show: moderate-severe lateral and patellofemoral compartment space narrowing with near bone-on-bone opposition. Subchondral sclerosis and osteophyte formation present. Valgus alignment          Procedures, if performed today:    Procedures    None performed      Portions of the record may have been created with voice recognition software. Occasional wrong word or "sound a like" substitutions may have occurred due to the inherent limitations of voice recognition software. Read the chart carefully and recognize, using context, where substitutions have occurred.

## 2023-10-20 ENCOUNTER — PROCEDURE VISIT (OUTPATIENT)
Dept: OBGYN CLINIC | Facility: MEDICAL CENTER | Age: 78
End: 2023-10-20

## 2023-10-20 VITALS
HEART RATE: 77 BPM | SYSTOLIC BLOOD PRESSURE: 140 MMHG | WEIGHT: 174 LBS | HEIGHT: 64 IN | BODY MASS INDEX: 29.71 KG/M2 | DIASTOLIC BLOOD PRESSURE: 84 MMHG

## 2023-10-20 DIAGNOSIS — M25.561 CHRONIC PAIN OF RIGHT KNEE: ICD-10-CM

## 2023-10-20 DIAGNOSIS — G89.29 CHRONIC PAIN OF RIGHT KNEE: ICD-10-CM

## 2023-10-20 DIAGNOSIS — M17.11 ARTHRITIS OF RIGHT KNEE: Primary | ICD-10-CM

## 2023-10-20 NOTE — PROGRESS NOTES
Subjective;    adult female with established osteoarthritis of the right knee. She has been approved to receive Durolane product right knee. She presents to the office to receive the injection    Past Medical History:   Diagnosis Date    Acid reflux     Anxiety     Hyperlipidemia     Skin tag     last assessed 17, resolved 10/2/17        Past Surgical History:   Procedure Laterality Date    COLONOSCOPY      KNEE ARTHROSCOPY Left     KNEE ARTHROSCOPY W/ MENISCAL REPAIR Left     [place] ARTHRP KNE CONDYLE&PLATU MEDIAL&LAT COMPARTMENTS Left 10/24/2019    Procedure: ARTHROPLASTY KNEE TOTAL;  Surgeon: Emmanuelle Franks MD;  Location: [facility];  Service: Orthopedics    TONSILLECTOMY AND ADENOIDECTOMY         Family History   Problem Relation Age of Onset    Stroke Mother         CVA    Hyperlipidemia Mother     Stroke Maternal Grandfather         CVA    Stroke Family         CVA    Other Family         breast disorders    Prostate cancer Father 76    Other Maternal Aunt         breast disorders     No Known Problems Sister     No Known Problems Daughter     No Known Problems Maternal Grandmother     No Known Problems Paternal Grandmother     No Known Problems Paternal Grandfather     No Known Problems Daughter     No Known Problems Son     Breast cancer Maternal Aunt 60    No Known Problems Maternal Aunt     No Known Problems Maternal Aunt     No Known Problems Maternal Aunt     No Known Problems Maternal Aunt     No Known Problems Paternal Aunt     No Known Problems Paternal Aunt        Social History     Tobacco Use    Smoking status: Former     Packs/day: 0.50     Years: 17.00     Total pack years: 8.50     Types: Cigarettes     Start date:      Quit date:      Years since quittin.8    Smokeless tobacco: Never   Vaping Use    Vaping Use: Never used   Substance Use Topics    Alcohol use: Yes     Comment: socially 0-2 weekly.      Drug use: No     Exam;    Right knee is devoid of any bruising redness or palpable warmth. Right knee has no fluid distention or effusion  It Allows for safe administration of medication    Large joint arthrocentesis  Procedure Details  Location: knee - Knee joint: Right knee superolateral approach. Needle size: 18 G (RP 27 G)  Medications administered: 3 mL sodium hyaluronate 60 MG/3ML    Patient tolerance: patient tolerated the procedure well with no immediate complications  Dressing:  Sterile dressing applied      Impression;    Right knee osteoarthritis  Right knee pain    Plan;    Durolane was injected to the suprapatellar pouch.,  And tolerated well by the patient  We will see her in 3 additional months.     Her experience was supervised by and plan related by the attending surgeon it was my privilege to assist him in the delivery of her care

## 2023-12-01 ENCOUNTER — RA CDI HCC (OUTPATIENT)
Dept: OTHER | Facility: HOSPITAL | Age: 78
End: 2023-12-01

## 2023-12-01 ENCOUNTER — APPOINTMENT (OUTPATIENT)
Dept: LAB | Age: 78
End: 2023-12-01
Payer: COMMERCIAL

## 2023-12-01 DIAGNOSIS — M85.89 OSTEOPENIA OF MULTIPLE SITES: ICD-10-CM

## 2023-12-01 DIAGNOSIS — E78.00 HYPERCHOLESTEROLEMIA: ICD-10-CM

## 2023-12-01 DIAGNOSIS — F32.89 OTHER DEPRESSION: ICD-10-CM

## 2023-12-01 DIAGNOSIS — R73.09 ABNORMAL GLUCOSE: ICD-10-CM

## 2023-12-01 LAB
25(OH)D3 SERPL-MCNC: 39.1 NG/ML (ref 30–100)
ALBUMIN SERPL BCP-MCNC: 4.2 G/DL (ref 3.5–5)
ALP SERPL-CCNC: 46 U/L (ref 34–104)
ALT SERPL W P-5'-P-CCNC: 16 U/L (ref 7–52)
ANION GAP SERPL CALCULATED.3IONS-SCNC: 7 MMOL/L
AST SERPL W P-5'-P-CCNC: 19 U/L (ref 13–39)
BASOPHILS # BLD AUTO: 0.04 THOUSANDS/ÂΜL (ref 0–0.1)
BASOPHILS NFR BLD AUTO: 1 % (ref 0–1)
BILIRUB SERPL-MCNC: 0.47 MG/DL (ref 0.2–1)
BUN SERPL-MCNC: 14 MG/DL (ref 5–25)
CALCIUM SERPL-MCNC: 9.9 MG/DL (ref 8.4–10.2)
CHLORIDE SERPL-SCNC: 108 MMOL/L (ref 96–108)
CHOLEST SERPL-MCNC: 185 MG/DL
CO2 SERPL-SCNC: 29 MMOL/L (ref 21–32)
CREAT SERPL-MCNC: 0.69 MG/DL (ref 0.6–1.3)
EOSINOPHIL # BLD AUTO: 0.17 THOUSAND/ÂΜL (ref 0–0.61)
EOSINOPHIL NFR BLD AUTO: 3 % (ref 0–6)
ERYTHROCYTE [DISTWIDTH] IN BLOOD BY AUTOMATED COUNT: 12.8 % (ref 11.6–15.1)
EST. AVERAGE GLUCOSE BLD GHB EST-MCNC: 117 MG/DL
GFR SERPL CREATININE-BSD FRML MDRD: 84 ML/MIN/1.73SQ M
GLUCOSE P FAST SERPL-MCNC: 94 MG/DL (ref 65–99)
HBA1C MFR BLD: 5.7 %
HCT VFR BLD AUTO: 42.9 % (ref 34.8–46.1)
HDLC SERPL-MCNC: 79 MG/DL
HGB BLD-MCNC: 13.8 G/DL (ref 11.5–15.4)
IMM GRANULOCYTES # BLD AUTO: 0.06 THOUSAND/UL (ref 0–0.2)
IMM GRANULOCYTES NFR BLD AUTO: 1 % (ref 0–2)
LDLC SERPL CALC-MCNC: 91 MG/DL (ref 0–100)
LYMPHOCYTES # BLD AUTO: 1.59 THOUSANDS/ÂΜL (ref 0.6–4.47)
LYMPHOCYTES NFR BLD AUTO: 26 % (ref 14–44)
MCH RBC QN AUTO: 31.9 PG (ref 26.8–34.3)
MCHC RBC AUTO-ENTMCNC: 32.2 G/DL (ref 31.4–37.4)
MCV RBC AUTO: 99 FL (ref 82–98)
MONOCYTES # BLD AUTO: 0.41 THOUSAND/ÂΜL (ref 0.17–1.22)
MONOCYTES NFR BLD AUTO: 7 % (ref 4–12)
NEUTROPHILS # BLD AUTO: 3.86 THOUSANDS/ÂΜL (ref 1.85–7.62)
NEUTS SEG NFR BLD AUTO: 62 % (ref 43–75)
NRBC BLD AUTO-RTO: 0 /100 WBCS
PLATELET # BLD AUTO: 237 THOUSANDS/UL (ref 149–390)
PMV BLD AUTO: 10.5 FL (ref 8.9–12.7)
POTASSIUM SERPL-SCNC: 4.5 MMOL/L (ref 3.5–5.3)
PROT SERPL-MCNC: 6.5 G/DL (ref 6.4–8.4)
RBC # BLD AUTO: 4.32 MILLION/UL (ref 3.81–5.12)
SODIUM SERPL-SCNC: 144 MMOL/L (ref 135–147)
TRIGL SERPL-MCNC: 77 MG/DL
WBC # BLD AUTO: 6.13 THOUSAND/UL (ref 4.31–10.16)

## 2023-12-01 PROCEDURE — 85025 COMPLETE CBC W/AUTO DIFF WBC: CPT

## 2023-12-01 PROCEDURE — 83036 HEMOGLOBIN GLYCOSYLATED A1C: CPT

## 2023-12-01 PROCEDURE — 80053 COMPREHEN METABOLIC PANEL: CPT

## 2023-12-01 PROCEDURE — 36415 COLL VENOUS BLD VENIPUNCTURE: CPT

## 2023-12-01 PROCEDURE — 80061 LIPID PANEL: CPT

## 2023-12-01 PROCEDURE — 82306 VITAMIN D 25 HYDROXY: CPT

## 2023-12-01 NOTE — PROGRESS NOTES
[address] [facility] coding opportunities       Chart reviewed, no opportunity found: [address]        Patients Insurance     Medicare Insurance: [employer]

## 2023-12-07 ENCOUNTER — OFFICE VISIT (OUTPATIENT)
Dept: INTERNAL MEDICINE CLINIC | Age: 78
End: 2023-12-07
Payer: COMMERCIAL

## 2023-12-07 VITALS
WEIGHT: 173 LBS | OXYGEN SATURATION: 99 % | HEIGHT: 64 IN | DIASTOLIC BLOOD PRESSURE: 80 MMHG | HEART RATE: 89 BPM | SYSTOLIC BLOOD PRESSURE: 122 MMHG | TEMPERATURE: 98 F | BODY MASS INDEX: 29.53 KG/M2

## 2023-12-07 DIAGNOSIS — R73.09 ABNORMAL GLUCOSE: ICD-10-CM

## 2023-12-07 DIAGNOSIS — K21.9 GASTROESOPHAGEAL REFLUX DISEASE WITHOUT ESOPHAGITIS: ICD-10-CM

## 2023-12-07 DIAGNOSIS — F41.9 ANXIETY: ICD-10-CM

## 2023-12-07 DIAGNOSIS — E78.00 HYPERCHOLESTEROLEMIA: Primary | ICD-10-CM

## 2023-12-07 DIAGNOSIS — E78.5 HYPERLIPIDEMIA, UNSPECIFIED HYPERLIPIDEMIA TYPE: ICD-10-CM

## 2023-12-07 DIAGNOSIS — Z00.00 MEDICARE ANNUAL WELLNESS VISIT, SUBSEQUENT: ICD-10-CM

## 2023-12-07 DIAGNOSIS — L71.9 ROSACEA: ICD-10-CM

## 2023-12-07 DIAGNOSIS — G56.03 BILATERAL CARPAL TUNNEL SYNDROME: ICD-10-CM

## 2023-12-07 DIAGNOSIS — M85.89 OSTEOPENIA OF MULTIPLE SITES: ICD-10-CM

## 2023-12-07 PROCEDURE — 99214 OFFICE O/P EST MOD 30 MIN: CPT | Performed by: FAMILY MEDICINE

## 2023-12-07 PROCEDURE — G0439 PPPS, SUBSEQ VISIT: HCPCS | Performed by: FAMILY MEDICINE

## 2023-12-07 RX ORDER — GABAPENTIN 300 MG/1
300 CAPSULE ORAL
Qty: 90 CAPSULE | Refills: 1 | Status: SHIPPED | OUTPATIENT
Start: 2023-12-07

## 2023-12-07 RX ORDER — SIMVASTATIN 20 MG
20 TABLET ORAL
Qty: 90 TABLET | Refills: 1 | Status: SHIPPED | OUTPATIENT
Start: 2023-12-07

## 2023-12-07 RX ORDER — SERTRALINE HYDROCHLORIDE 100 MG/1
100 TABLET, FILM COATED ORAL
Qty: 90 TABLET | Refills: 1 | Status: SHIPPED | OUTPATIENT
Start: 2023-12-07

## 2023-12-07 RX ORDER — PANTOPRAZOLE SODIUM 40 MG/1
40 TABLET, DELAYED RELEASE ORAL DAILY PRN
Qty: 90 TABLET | Refills: 1 | Status: SHIPPED | OUTPATIENT
Start: 2023-12-07

## 2023-12-07 RX ORDER — ALPRAZOLAM 0.5 MG/1
0.5 TABLET ORAL DAILY PRN
Qty: 30 TABLET | Refills: 1 | Status: SHIPPED | OUTPATIENT
Start: 2023-12-07

## 2023-12-07 NOTE — PROGRESS NOTES
Assessment and Plan:     Problem List Items Addressed This Visit          Digestive    Gastroesophageal reflux disease without esophagitis       Nervous and Auditory    Bilateral carpal tunnel syndrome       Musculoskeletal and Integument    Rosacea    Relevant Medications    Diclofenac Sodium (VOLTAREN) 1 %    Osteopenia of multiple sites       Other    Hypercholesterolemia - Primary    Abnormal glucose    Medicare annual wellness visit, subsequent     Other Visit Diagnoses       Anxiety        Hyperlipidemia, unspecified hyperlipidemia type              BMI Counseling: Body mass index is 29.7 kg/m². The BMI is above normal. Nutrition recommendations include moderation in carbohydrate intake. Exercise recommendations include exercising 3-5 times per week. No pharmacotherapy was ordered. Rationale for BMI follow-up plan is due to patient being overweight or obese. Preventive health issues were discussed with patient, and age appropriate screening tests were ordered as noted in patient's After Visit Summary. Personalized health advice and appropriate referrals for health education or preventive services given if needed, as noted in patient's After Visit Summary.      History of Present Illness:     Patient presents for a Medicare Wellness Visit    HPI   Patient Care Team:  Sebastian Kline DO as PCP - General     Review of Systems:     Review of Systems     Problem List:     Patient Active Problem List   Diagnosis    Hypercholesterolemia    Gastroesophageal reflux disease without esophagitis    Depression    Rosacea    History of arthroscopy of left knee    Status post total left knee replacement    Bilateral carpal tunnel syndrome    Abnormal glucose    Medicare annual wellness visit, subsequent    Hip pain, left    Osteopenia of multiple sites    Non-seasonal allergic rhinitis due to pollen    COVID-19 virus infection    Seborrheic keratoses, inflamed      Past Medical and Surgical History:     Past Medical History:   Diagnosis Date    Acid reflux     Anxiety     Hyperlipidemia     Skin tag     last assessed 19, resolved 10/2/17      Past Surgical History:   Procedure Laterality Date    COLONOSCOPY      KNEE ARTHROSCOPY Left     KNEE ARTHROSCOPY W/ MENISCAL REPAIR Left     IA ARTHRP KNE CONDYLE&PLATU MEDIAL&LAT COMPARTMENTS Left 10/24/2019    Procedure: ARTHROPLASTY KNEE TOTAL;  Surgeon: Lizzette Valadez MD;  Location: [facility];  Service: Orthopedics    TONSILLECTOMY AND ADENOIDECTOMY        Family History:     Family History   Problem Relation Age of Onset    Stroke Mother         CVA    Hyperlipidemia Mother     Stroke Maternal Grandfather         CVA    Stroke Family         CVA    Other Family         breast disorders    Prostate cancer Father 76    Other Maternal Aunt         breast disorders     No Known Problems Sister     No Known Problems Daughter     No Known Problems Maternal Grandmother     No Known Problems Paternal Grandmother     No Known Problems Paternal Grandfather     No Known Problems Daughter     No Known Problems Son     Breast cancer Maternal Aunt 60    No Known Problems Maternal Aunt     No Known Problems Maternal Aunt     No Known Problems Maternal Aunt     No Known Problems Maternal Aunt     No Known Problems Paternal Aunt     No Known Problems Paternal Aunt       Social History:     Social History     Socioeconomic History    Marital status:      Spouse name: Not on file    Number of children: 3    Years of education: Not on file    Highest education level: Not on file   Occupational History    Occupation: Caterer   Tobacco Use    Smoking status: Former     Packs/day: 0.50     Years: 17.00     Total pack years: 8.50     Types: Cigarettes     Start date:      Quit date:      Years since quittin.9    Smokeless tobacco: Never   Vaping Use    Vaping Use: Never used   Substance and Sexual Activity    Alcohol use: Yes     Comment: socially 0-2 weekly. Drug use:  No Sexual activity: Not Currently   Other Topics Concern    Not on file   Social History Narrative    Caffeine use, admits to consuming soda    Daily coffee consumption (4 cups/day)     Exercising regularly      Social Determinants of Health     Financial Resource Strain: Low Risk  (12/7/2023)    Overall Financial Resource Strain (CARDIA)     Difficulty of Paying Living Expenses: Not hard at all   Food Insecurity: Not on file   Transportation Needs: No Transportation Needs (12/7/2023)    PRAPARE - Transportation     Lack of Transportation (Medical): No     Lack of Transportation (Non-Medical): No   Physical Activity: Not on file   Stress: Not on file   Social Connections: Not on file   Intimate Partner Violence: Not on file   Housing Stability: Not on file      Medications and Allergies:     Current Outpatient Medications   Medication Sig Dispense Refill    ALPRAZolam (XANAX) 0.5 mg tablet Take 1 tablet (0.5 mg total) by mouth daily as needed (prn) 30 tablet 1    B Complex Vitamins (B COMPLEX PO) Take 1 capsule by mouth daily       CALCIUM PO Take 1,200 mg by mouth daily       Cholecalciferol (VITAMIN D3 PO) Take 1 capsule by mouth daily       Diclofenac Sodium (VOLTAREN) 1 % Apply 2 g topically 4 (four) times a day      gabapentin (NEURONTIN) 300 mg capsule Take 1 capsule (300 mg total) by mouth daily at bedtime 90 capsule 1    pantoprazole (PROTONIX) 40 mg tablet Take 1 tablet (40 mg total) by mouth daily as needed (prn) 90 tablet 1    sertraline (ZOLOFT) 100 mg tablet Take 1 tablet (100 mg total) by mouth daily at bedtime 90 tablet 1    simvastatin (ZOCOR) 20 mg tablet Take 1 tablet (20 mg total) by mouth daily at bedtime 90 tablet 1     No current facility-administered medications for this visit.      Allergies   Allergen Reactions    Tetracyclines & Related      Blisters, yeast infection      Immunizations:     Immunization History   Administered Date(s) Administered    COVID-19 MODERNA VACC 0.5 ML IM 01/18/2021, 02/26/2021, 12/26/2021    INFLUENZA 11/01/2018, 10/14/2019    Influenza Split High Dose Preservative Free IM 10/05/2015, 12/21/2016, 10/20/2017, 10/14/2019    Influenza, high dose seasonal 0.7 mL 09/24/2020, 11/11/2021    Pneumococcal Conjugate 13-Valent 04/21/2017    Pneumococcal Polysaccharide PPV23 02/07/2019      Health Maintenance:         Topic Date Due    Breast Cancer Screening: Mammogram  09/30/2023    Colorectal Cancer Screening  01/06/2024    Hepatitis C Screening  Completed         Topic Date Due    COVID-19 Vaccine (4 - Moderna series) 02/20/2022    Influenza Vaccine (1) 09/01/2023      Medicare Screening Tests and Risk Assessments:     Faheem Hartman is here for her Subsequent Wellness visit. Health Risk Assessment:   Patient rates overall health as very good. Patient feels that their physical health rating is same. Patient is very satisfied with their life. Eyesight was rated as same. Hearing was rated as same. Patient feels that their emotional and mental health rating is much better. Patients states they are never, rarely angry. Patient states they are sometimes unusually tired/fatigued. Pain experienced in the last 7 days has been some. Patient's pain rating has been 4/10. Patient states that she has experienced no weight loss or gain in last 6 months. Depression Screening:   PHQ-9 Score: 0      Fall Risk Screening: In the past year, patient has experienced: history of falling in past year    Number of falls: 1  Injured during fall?: No    Feels unsteady when standing or walking?: No    Worried about falling?: No      Urinary Incontinence Screening:   Patient has not leaked urine accidently in the last six months. Home Safety:  Patient does not have trouble with stairs inside or outside of their home. Patient has working smoke alarms and has working carbon monoxide detector. Home safety hazards include: none. Nutrition:   Current diet is Regular.      Medications:   Patient is not currently taking any over-the-counter supplements. Patient is able to manage medications. Activities of Daily Living (ADLs)/Instrumental Activities of Daily Living (IADLs):   Walk and transfer into and out of bed and chair?: Yes  Dress and groom yourself?: Yes    Bathe or shower yourself?: Yes    Feed yourself? Yes  Do your laundry/housekeeping?: Yes  Manage your money, pay your bills and track your expenses?: Yes  Make your own meals?: Yes    Do your own shopping?: Yes    Previous Hospitalizations:   Any hospitalizations or ED visits within the last 12 months?: No      Advance Care Planning:   Living will: Yes    Durable POA for healthcare: Yes    Advanced directive: Yes      Comments: Her daughters    Cognitive Screening:   Provider or family/friend/caregiver concerned regarding cognition?: No    PREVENTIVE SCREENINGS      Cardiovascular Screening:    General: Screening Not Indicated and History Lipid Disorder      Diabetes Screening:     General: Screening Current      Colorectal Cancer Screening:     General: Screening Current      Breast Cancer Screening:     General: Screening Current      Cervical Cancer Screening:    General: Screening Not Indicated      Lung Cancer Screening:     General: Screening Not Indicated      Hepatitis C Screening:    General: Screening Current    Screening, Brief Intervention, and Referral to Treatment (SBIRT)    Screening  Typical number of drinks in a day: 1  Typical number of drinks in a week: 2  Interpretation: Low risk drinking behavior. Single Item Drug Screening:  How often have you used an illegal drug (including marijuana) or a prescription medication for non-medical reasons in the past year? never    Single Item Drug Screen Score: 0  Interpretation: Negative screen for possible drug use disorder    Brief Intervention  Alcohol & drug use screenings were reviewed. No concerns regarding substance use disorder identified.      Other Counseling Topics:   Car/seat belt/driving safety, skin self-exam, sunscreen and regular weightbearing exercise and calcium and vitamin D intake. No results found.      Physical Exam:     /84 (BP Location: Left arm, Patient Position: Sitting, Cuff Size: Adult)   Pulse 89   Temp 98 °F (36.7 °C)   Ht 5' 4" (1.626 m)   Wt 78.5 kg (173 lb)   SpO2 99%   BMI 29.70 kg/m²     Physical Exam     Sylvain Saenz DO

## 2023-12-07 NOTE — PROGRESS NOTES
Assessment/Plan:    1. Hypercholesterolemia  -     Lipid Panel with Direct LDL reflex; Future  -     Comprehensive metabolic panel; Future  -     CBC and differential; Future    2. Anxiety  -     ALPRAZolam (XANAX) 0.5 mg tablet; Take 1 tablet (0.5 mg total) by mouth daily as needed (prn)  -     sertraline (ZOLOFT) 100 mg tablet; Take 1 tablet (100 mg total) by mouth daily at bedtime    3. Bilateral carpal tunnel syndrome  -     gabapentin (NEURONTIN) 300 mg capsule; Take 1 capsule (300 mg total) by mouth daily at bedtime    4. Gastroesophageal reflux disease without esophagitis  -     pantoprazole (PROTONIX) 40 mg tablet; Take 1 tablet (40 mg total) by mouth daily as needed (prn)    5. Hyperlipidemia, unspecified hyperlipidemia type  -     simvastatin (ZOCOR) 20 mg tablet; Take 1 tablet (20 mg total) by mouth daily at bedtime    6. Osteopenia of multiple sites    7. Rosacea    8. Abnormal glucose  -     Hemoglobin A1C; Future    9. Medicare annual wellness visit, subsequent            There are no Patient Instructions on file for this visit. No follow-ups on file. Subjective:      Patient ID: Mamie Ozuna is a 70 y.o. female. Chief Complaint   Patient presents with    Medicare Wellness Visit     Sub Medicare Wellness Visit     Follow-up     6 month follow up   Labs done on //         HPI  Hyperlipidemia (Follow-Up): The patient states her hyperlipidemia has been under good control since the last visit. She has no comorbid illnesses. Symptoms: denies chest pain,-denies intermittent leg claudication,-denies muscle pain-and-denies muscle weakness. Associated symptoms include no focal neurologic deficits-and-no memory loss. Medications: the patient is adherent with her medication regimen.-the patient complains of medication side effects. The patient is doing well with her hyperlipidemia goals. the patient's LDL goal is <100 mg/dL. -the patient's last LDL was 72 mg/dL. -(5/2014).   February 2019, due for labs and has lab slip  August 2019, very well controlled  March 2020:  Not able to get labs done since they are closed right now however compliant with the medication and does not need refills  November 2021, had lab work done in September. Nicely controlled levels. No issueson simvastatin 20 mg daily and levels are well controlled. No side effects. DEc 2022: well controlled; no issues  June 2023, well controlled, no issues, remains very active  December 2023, significant improvement in lipid levels, taking simvastatin and tolerating well. Has been very active     Gastroesophageal Reflux Disease (Follow-Up): The patient is being seen for follow-up of gastroesophageal reflux disease. Is essentially resolved symptoms. Not on anything on a regular basis. March 2020, well controlled November 2021, controlled. Not on daily PPI  DEc 2022: stable   June 2023, no issues, as needed pantoprazole if necessary  December 2023, heartburn well-controlled, takes pantoprazole intermittently     Depression (Follow-Up): The patient states her depression has been stable since the last visit. She has no comorbid illnesses. Interval Events: doing well but  recently dx with lung cancer after brain surgery and colonoscopy. She has had no significant interval events. Interval Symptoms: improved depression,-improved depressed mood,-improved loss of interest or pleasure in activities,-stable insomnia,-denies feelings of worthlessness,-denies feelings of guilt,-denies trouble concentrating,-improved anxiety-and-denies sexual dysfunction. February 2019,  passed away in the winter 2018 but she has a very large support system and is doing relatively well. Taking it day by day  Associated symptoms include: no recent weight gain-and-no thoughts of suicide. Social Support: the patient has good social support. Medications: the patient is adherent with her medication regimen.-She denies medication side effects.   Pt has been on zoloft >5 years. States she feels really stable on it and does not feel the need to go off of it August 2019, doing very well and does not require any medication changes  March 2020, doing very well with present dose Zoloft with no breakthrough symptoms. No suicidal or homicidal ideations   April 2021, no HI or SI and tolerating medication well. Symptoms are well controlled with no flare ups. Does not need a change in dose. November 2021, no HI or SI. Doing well with Zoloft. No breakthrough symptoms. June 2023, well controlled off remains on Zoloft, does not want dose change, infrequently uses Xanax, does a lot of yard work  December 2023, no HI or SI, on Zoloft daily, minimal use of alprazolam     Osteopenia of multiple sites. Takes vitamin-D and calcium on a regular basis and is very active. Up-to-date on bone density study.     The following portions of the patient's history were reviewed and updated as appropriate: allergies, current medications, past family history, past medical history, past social history, past surgical history and problem list.    Review of Systems        Constitutional:  Denies fever or chills   Eyes:  Denies double , blurry vision or eye pain  HENT:  Denies nasal congestion, sore throat or new hearing issues  Respiratory:  Denies cough or shortness of breath or wheezing  Cardiovascular:  Denies palpitations or chest pain  GI:  Denies abdominal pain, nausea, or vomiting, no loose stools, no reflux  Integument:  Denies rash , no open areas  Neurologic:  Denies headache or focal weakness, no dizziness  : no dysuria, or hematuria      Current Outpatient Medications   Medication Sig Dispense Refill    ALPRAZolam (XANAX) 0.5 mg tablet Take 1 tablet (0.5 mg total) by mouth daily as needed (prn) 30 tablet 1    B Complex Vitamins (B COMPLEX PO) Take 1 capsule by mouth daily       CALCIUM PO Take 1,200 mg by mouth daily       Cholecalciferol (VITAMIN D3 PO) Take 1 capsule by mouth daily       Diclofenac Sodium (VOLTAREN) 1 % Apply 2 g topically 4 (four) times a day      gabapentin (NEURONTIN) 300 mg capsule Take 1 capsule (300 mg total) by mouth daily at bedtime 90 capsule 1    pantoprazole (PROTONIX) 40 mg tablet Take 1 tablet (40 mg total) by mouth daily as needed (prn) 90 tablet 1    sertraline (ZOLOFT) 100 mg tablet Take 1 tablet (100 mg total) by mouth daily at bedtime 90 tablet 1    simvastatin (ZOCOR) 20 mg tablet Take 1 tablet (20 mg total) by mouth daily at bedtime 90 tablet 1     No current facility-administered medications for this visit.        Objective:    /80 (BP Location: Left arm, Patient Position: Sitting, Cuff Size: Adult)   Pulse 89   Temp 98 °F (36.7 °C)   Ht 5' 4" (1.626 m)   Wt 78.5 kg (173 lb)   SpO2 99%   BMI 29.70 kg/m²        Physical Exam      Constitutional:  Well developed, well nourished, no acute distress, non-toxic appearance   Eyes:  PERRL, conjunctiva normal , non icteric sclera  HENT:  Atraumatic, oropharynx moist. Neck-  supple   Respiratory:  CTA b/l, normal breath sounds, no rales, no wheezing   Cardiovascular:  RRR, no murmurs, no LE edema b/l  GI:  Soft, nondistended, normal bowel sounds x 4, nontender, no organomegaly, no mass, no rebound, no guarding   Neurologic:  no focal deficits noted   Psychiatric:  Speech and behavior appropriate , AAO x 3        Verla Mater, DO

## 2024-01-31 ENCOUNTER — OFFICE VISIT (OUTPATIENT)
Age: 79
End: 2024-01-31
Payer: COMMERCIAL

## 2024-01-31 VITALS
TEMPERATURE: 97.9 F | DIASTOLIC BLOOD PRESSURE: 86 MMHG | SYSTOLIC BLOOD PRESSURE: 124 MMHG | BODY MASS INDEX: 29.79 KG/M2 | OXYGEN SATURATION: 96 % | HEIGHT: 65 IN | WEIGHT: 178.8 LBS | HEART RATE: 88 BPM

## 2024-01-31 DIAGNOSIS — F41.9 ANXIETY: ICD-10-CM

## 2024-01-31 DIAGNOSIS — S02.5XXD CLOSED FRACTURE OF TOOTH WITH ROUTINE HEALING: ICD-10-CM

## 2024-01-31 DIAGNOSIS — Z12.11 ENCOUNTER FOR SCREENING FOR MALIGNANT NEOPLASM OF COLON: Primary | ICD-10-CM

## 2024-01-31 DIAGNOSIS — M62.838 MUSCLE SPASM: ICD-10-CM

## 2024-01-31 PROCEDURE — 99213 OFFICE O/P EST LOW 20 MIN: CPT | Performed by: FAMILY MEDICINE

## 2024-01-31 RX ORDER — ALPRAZOLAM 0.5 MG/1
0.5 TABLET ORAL DAILY PRN
Qty: 30 TABLET | Refills: 1 | Status: SHIPPED | OUTPATIENT
Start: 2024-01-31

## 2024-01-31 RX ORDER — AMOXICILLIN 500 MG/1
500 CAPSULE ORAL
Qty: 21 CAPSULE | Refills: 0 | Status: SHIPPED | OUTPATIENT
Start: 2024-01-31 | End: 2024-02-07

## 2024-01-31 NOTE — PROGRESS NOTES
Assessment/Plan:    1. Encounter for screening for malignant neoplasm of colon  -     Cologuard    2. Closed fracture of tooth with routine healing  -     amoxicillin (AMOXIL) 500 mg capsule; Take 1 capsule (500 mg total) by mouth 3 (three) times daily after meals for 7 days    3. Muscle spasm    4. Anxiety  -     ALPRAZolam (XANAX) 0.5 mg tablet; Take 1 tablet (0.5 mg total) by mouth daily as needed (prn)            There are no Patient Instructions on file for this visit.    Return for Next scheduled follow up.    Subjective:      Patient ID: Jaja Griffith is a 78 y.o. female.    Chief Complaint   Patient presents with    Follow-up     Patient started 2 weeks ago with jaw pain radiating up into left ear    Health Screening     Mammogram ordered in June patient will call to schedule cologaurd ordered       HPI      Patient started 2 weeks ago with jaw pain radiating up into left ear.  Has pain when she lays down in the daytime also, taking Aleve D with relief in the daytime but does not take anything at night.  No upper respiratory symptoms such as runny nose congestion ear drainage fevers sore throat.  Occasionally she has sinus congestion which is not new for her.  Incidentally she also broke a tooth around that time which is left lower posterior.  She has been calling her dentist but unable to get through to them for an appointment.    The following portions of the patient's history were reviewed and updated as appropriate: allergies, current medications, past family history, past medical history, past social history, past surgical history and problem list.    Review of Systems      Constitutional:  Denies fever or chills   Eyes:  Denies double , blurry vision or eye pain  HENT:  Denies nasal congestion, sore throat or new hearing issues  Respiratory:  Denies cough or shortness of breath or wheezing  Cardiovascular:  Denies palpitations or chest pain  GI:  Denies abdominal pain, nausea, or vomiting, no loose  "stools, no reflux  Integument:  Denies rash , no open areas  Neurologic:  Denies headache or focal weakness, no dizziness  : no dysuria, or hematuria      Current Outpatient Medications   Medication Sig Dispense Refill    ALPRAZolam (XANAX) 0.5 mg tablet Take 1 tablet (0.5 mg total) by mouth daily as needed (prn) 30 tablet 1    amoxicillin (AMOXIL) 500 mg capsule Take 1 capsule (500 mg total) by mouth 3 (three) times daily after meals for 7 days 21 capsule 0    B Complex Vitamins (B COMPLEX PO) Take 1 capsule by mouth daily       CALCIUM PO Take 1,200 mg by mouth daily       Cholecalciferol (VITAMIN D3 PO) Take 1 capsule by mouth daily       Diclofenac Sodium (VOLTAREN) 1 % Apply 2 g topically 4 (four) times a day      gabapentin (NEURONTIN) 300 mg capsule Take 1 capsule (300 mg total) by mouth daily at bedtime 90 capsule 1    pantoprazole (PROTONIX) 40 mg tablet Take 1 tablet (40 mg total) by mouth daily as needed (prn) 90 tablet 1    sertraline (ZOLOFT) 100 mg tablet Take 1 tablet (100 mg total) by mouth daily at bedtime 90 tablet 1    simvastatin (ZOCOR) 20 mg tablet Take 1 tablet (20 mg total) by mouth daily at bedtime 90 tablet 1     No current facility-administered medications for this visit.       Objective:    /86 (BP Location: Left arm, Patient Position: Sitting, Cuff Size: Standard)   Pulse 88   Temp 97.9 °F (36.6 °C) (Temporal)   Ht 5' 4.88\" (1.648 m)   Wt 81.1 kg (178 lb 12.8 oz)   SpO2 96%   BMI 29.86 kg/m²        Physical Exam    Constitutional:  Well developed, well nourished, no acute distress, non-toxic appearance   Eyes:  PERRL, conjunctiva normal , non icteric sclera  HENT:  Atraumatic, oropharynx moist. Neck-  supple , very mild left maxillary sinus tenderness to palpation.,  No dental pain, no teeth pain, broken tooth noted left lower jaw posterior molar, no signs of infection, no erythema or drainage and nontender to palpation.  Respiratory:  CTA b/l, normal breath sounds, no " rales, no wheezing   Cardiovascular:  RRR, no murmurs, no LE edema b/l  GI:  Soft, nondistended, normal bowel sounds x 4, nontender, no organomegaly, no mass, no rebound, no guarding   Neurologic:  no focal deficits noted   Psychiatric:  Speech and behavior appropriate , AAO x 3  Musculoskeletal, tender to palpation left buccal area between masseter and buccal vicente.  No TMJ tenderness to palpation.  Hypertonicity noted.       Daryn Cristobal DO

## 2024-02-21 PROBLEM — Z00.00 MEDICARE ANNUAL WELLNESS VISIT, SUBSEQUENT: Status: RESOLVED | Noted: 2020-09-24 | Resolved: 2024-02-21

## 2024-04-06 LAB — COLOGUARD RESULT REPORTABLE: POSITIVE

## 2024-04-16 ENCOUNTER — TELEPHONE (OUTPATIENT)
Dept: INTERNAL MEDICINE CLINIC | Age: 79
End: 2024-04-16

## 2024-04-16 DIAGNOSIS — R19.5 POSITIVE COLORECTAL CANCER SCREENING USING COLOGUARD TEST: Primary | ICD-10-CM

## 2024-05-24 ENCOUNTER — HOSPITAL ENCOUNTER (OUTPATIENT)
Dept: RADIOLOGY | Age: 79
Discharge: HOME | End: 2024-05-24
Payer: COMMERCIAL

## 2024-05-24 VITALS — BODY MASS INDEX: 28.99 KG/M2 | WEIGHT: 174 LBS | HEIGHT: 65 IN

## 2024-05-24 DIAGNOSIS — Z12.31 SCREENING MAMMOGRAM, ENCOUNTER FOR: ICD-10-CM

## 2024-05-24 PROCEDURE — 77067 SCR MAMMO BI INCL CAD: CPT

## 2024-05-24 PROCEDURE — 77063 BREAST TOMOSYNTHESIS BI: CPT

## 2024-05-30 DIAGNOSIS — F41.9 ANXIETY: ICD-10-CM

## 2024-05-30 NOTE — TELEPHONE ENCOUNTER
Reason for call:   [x] Refill   [] Prior Auth  [] Other:     Office:   [x] PCP/Provider -   [] Specialty/Provider -     Medication: Alprazolam     Dose/Frequency: 0.5 mg tablet taken by mouth daily PRN     Quantity: 30    Pharmacy: [id number] - [place], [place] - [email or web address] [phone]     Does the patient have enough for 3 days?   [] Yes   [x] No - Send as HP to POD

## 2024-05-31 RX ORDER — ALPRAZOLAM 0.5 MG/1
0.5 TABLET ORAL DAILY PRN
Qty: 30 TABLET | Refills: 0 | Status: SHIPPED | OUTPATIENT
Start: 2024-05-31

## 2024-06-03 DIAGNOSIS — K21.9 GASTROESOPHAGEAL REFLUX DISEASE WITHOUT ESOPHAGITIS: ICD-10-CM

## 2024-06-03 DIAGNOSIS — F41.9 ANXIETY: ICD-10-CM

## 2024-06-03 DIAGNOSIS — G56.03 BILATERAL CARPAL TUNNEL SYNDROME: ICD-10-CM

## 2024-06-03 DIAGNOSIS — E78.5 HYPERLIPIDEMIA, UNSPECIFIED HYPERLIPIDEMIA TYPE: ICD-10-CM

## 2024-06-03 RX ORDER — GABAPENTIN 300 MG/1
300 CAPSULE ORAL
Qty: 90 CAPSULE | Refills: 1 | Status: SHIPPED | OUTPATIENT
Start: 2024-06-03

## 2024-06-03 RX ORDER — PANTOPRAZOLE SODIUM 40 MG/1
40 TABLET, DELAYED RELEASE ORAL DAILY PRN
Qty: 90 TABLET | Refills: 1 | Status: SHIPPED | OUTPATIENT
Start: 2024-06-03

## 2024-06-03 RX ORDER — SERTRALINE HYDROCHLORIDE 100 MG/1
100 TABLET, FILM COATED ORAL
Qty: 90 TABLET | Refills: 1 | Status: SHIPPED | OUTPATIENT
Start: 2024-06-03

## 2024-06-03 RX ORDER — SIMVASTATIN 20 MG
20 TABLET ORAL
Qty: 90 TABLET | Refills: 1 | Status: SHIPPED | OUTPATIENT
Start: 2024-06-03

## 2024-06-17 ENCOUNTER — TELEPHONE (OUTPATIENT)
Age: 79
End: 2024-06-17

## 2024-06-17 NOTE — TELEPHONE ENCOUNTER
Pt called in asking if she could get a wart removed 6/21 on her OVS with Dr. Cristobal. Pt stated wart is on her back, bra strap bothers her, laying on it in very uncomfortable.    *Please follow up with pt

## 2024-06-18 ENCOUNTER — APPOINTMENT (OUTPATIENT)
Dept: LAB | Age: 79
End: 2024-06-18
Payer: COMMERCIAL

## 2024-06-18 DIAGNOSIS — R73.09 ABNORMAL GLUCOSE: ICD-10-CM

## 2024-06-18 DIAGNOSIS — E78.00 HYPERCHOLESTEROLEMIA: ICD-10-CM

## 2024-06-18 LAB
ALBUMIN SERPL BCP-MCNC: 4.2 G/DL (ref 3.5–5)
ALP SERPL-CCNC: 49 U/L (ref 34–104)
ALT SERPL W P-5'-P-CCNC: 17 U/L (ref 7–52)
ANION GAP SERPL CALCULATED.3IONS-SCNC: 10 MMOL/L (ref 4–13)
AST SERPL W P-5'-P-CCNC: 19 U/L (ref 13–39)
BASOPHILS # BLD AUTO: 0.04 THOUSANDS/ÂΜL (ref 0–0.1)
BASOPHILS NFR BLD AUTO: 1 % (ref 0–1)
BILIRUB SERPL-MCNC: 0.52 MG/DL (ref 0.2–1)
BUN SERPL-MCNC: 17 MG/DL (ref 5–25)
CALCIUM SERPL-MCNC: 9.6 MG/DL (ref 8.4–10.2)
CHLORIDE SERPL-SCNC: 106 MMOL/L (ref 96–108)
CHOLEST SERPL-MCNC: 185 MG/DL
CO2 SERPL-SCNC: 26 MMOL/L (ref 21–32)
CREAT SERPL-MCNC: 0.68 MG/DL (ref 0.6–1.3)
EOSINOPHIL # BLD AUTO: 0.25 THOUSAND/ÂΜL (ref 0–0.61)
EOSINOPHIL NFR BLD AUTO: 4 % (ref 0–6)
ERYTHROCYTE [DISTWIDTH] IN BLOOD BY AUTOMATED COUNT: 12.6 % (ref 11.6–15.1)
EST. AVERAGE GLUCOSE BLD GHB EST-MCNC: 117 MG/DL
GFR SERPL CREATININE-BSD FRML MDRD: 85 ML/MIN/1.73SQ M
GLUCOSE P FAST SERPL-MCNC: 111 MG/DL (ref 65–99)
HBA1C MFR BLD: 5.7 %
HCT VFR BLD AUTO: 42.7 % (ref 34.8–46.1)
HDLC SERPL-MCNC: 74 MG/DL
HGB BLD-MCNC: 14.3 G/DL (ref 11.5–15.4)
IMM GRANULOCYTES # BLD AUTO: 0.02 THOUSAND/UL (ref 0–0.2)
IMM GRANULOCYTES NFR BLD AUTO: 0 % (ref 0–2)
LDLC SERPL CALC-MCNC: 86 MG/DL (ref 0–100)
LYMPHOCYTES # BLD AUTO: 1.95 THOUSANDS/ÂΜL (ref 0.6–4.47)
LYMPHOCYTES NFR BLD AUTO: 27 % (ref 14–44)
MCH RBC QN AUTO: 32.3 PG (ref 26.8–34.3)
MCHC RBC AUTO-ENTMCNC: 33.5 G/DL (ref 31.4–37.4)
MCV RBC AUTO: 96 FL (ref 82–98)
MONOCYTES # BLD AUTO: 0.52 THOUSAND/ÂΜL (ref 0.17–1.22)
MONOCYTES NFR BLD AUTO: 7 % (ref 4–12)
NEUTROPHILS # BLD AUTO: 4.4 THOUSANDS/ÂΜL (ref 1.85–7.62)
NEUTS SEG NFR BLD AUTO: 61 % (ref 43–75)
NRBC BLD AUTO-RTO: 0 /100 WBCS
PLATELET # BLD AUTO: 205 THOUSANDS/UL (ref 149–390)
PMV BLD AUTO: 11.3 FL (ref 8.9–12.7)
POTASSIUM SERPL-SCNC: 4.3 MMOL/L (ref 3.5–5.3)
PROT SERPL-MCNC: 6.7 G/DL (ref 6.4–8.4)
RBC # BLD AUTO: 4.43 MILLION/UL (ref 3.81–5.12)
SODIUM SERPL-SCNC: 142 MMOL/L (ref 135–147)
TRIGL SERPL-MCNC: 124 MG/DL
WBC # BLD AUTO: 7.18 THOUSAND/UL (ref 4.31–10.16)

## 2024-06-18 PROCEDURE — 80053 COMPREHEN METABOLIC PANEL: CPT

## 2024-06-18 PROCEDURE — 83036 HEMOGLOBIN GLYCOSYLATED A1C: CPT

## 2024-06-18 PROCEDURE — 36415 COLL VENOUS BLD VENIPUNCTURE: CPT

## 2024-06-18 PROCEDURE — 85025 COMPLETE CBC W/AUTO DIFF WBC: CPT

## 2024-06-18 PROCEDURE — 80061 LIPID PANEL: CPT

## 2024-06-21 ENCOUNTER — OFFICE VISIT (OUTPATIENT)
Dept: INTERNAL MEDICINE CLINIC | Age: 79
End: 2024-06-21
Payer: COMMERCIAL

## 2024-06-21 VITALS
WEIGHT: 180 LBS | BODY MASS INDEX: 29.99 KG/M2 | SYSTOLIC BLOOD PRESSURE: 116 MMHG | OXYGEN SATURATION: 96 % | TEMPERATURE: 97.8 F | HEART RATE: 86 BPM | HEIGHT: 65 IN | DIASTOLIC BLOOD PRESSURE: 74 MMHG

## 2024-06-21 DIAGNOSIS — L71.9 ROSACEA: ICD-10-CM

## 2024-06-21 DIAGNOSIS — B07.9 VERRUCA: ICD-10-CM

## 2024-06-21 DIAGNOSIS — E78.00 HYPERCHOLESTEROLEMIA: Primary | ICD-10-CM

## 2024-06-21 DIAGNOSIS — F41.9 ANXIETY: ICD-10-CM

## 2024-06-21 DIAGNOSIS — R73.09 ABNORMAL GLUCOSE: ICD-10-CM

## 2024-06-21 DIAGNOSIS — L81.9 ATYPICAL PIGMENTED SKIN LESION: ICD-10-CM

## 2024-06-21 DIAGNOSIS — E55.9 VITAMIN D DEFICIENCY: ICD-10-CM

## 2024-06-21 DIAGNOSIS — M85.89 OSTEOPENIA OF MULTIPLE SITES: ICD-10-CM

## 2024-06-21 DIAGNOSIS — K21.9 GASTROESOPHAGEAL REFLUX DISEASE WITHOUT ESOPHAGITIS: ICD-10-CM

## 2024-06-21 PROBLEM — U07.1 COVID-19 VIRUS INFECTION: Status: RESOLVED | Noted: 2022-01-10 | Resolved: 2024-06-21

## 2024-06-21 PROCEDURE — 11300 SHAVE SKIN LESION 0.5 CM/<: CPT | Performed by: FAMILY MEDICINE

## 2024-06-21 PROCEDURE — 88305 TISSUE EXAM BY PATHOLOGIST: CPT | Performed by: PATHOLOGY

## 2024-06-21 PROCEDURE — 99214 OFFICE O/P EST MOD 30 MIN: CPT | Performed by: FAMILY MEDICINE

## 2024-06-21 RX ORDER — SERTRALINE HYDROCHLORIDE 25 MG/1
25 TABLET, FILM COATED ORAL DAILY
Qty: 90 TABLET | Refills: 1 | Status: SHIPPED | OUTPATIENT
Start: 2024-06-21 | End: 2024-12-18

## 2024-06-21 NOTE — PROGRESS NOTES
Assessment/Plan:    1. Hypercholesterolemia  -     Lipid Panel with Direct LDL reflex; Future  -     Comprehensive metabolic panel; Future  2. Osteopenia of multiple sites  3. Rosacea  4. Abnormal glucose  5. Gastroesophageal reflux disease without esophagitis  6. Anxiety  -     sertraline (ZOLOFT) 25 mg tablet; Take 1 tablet (25 mg total) by mouth daily In addition to 100 mg daily  7. Vitamin D deficiency  -     Vitamin D 25 hydroxy; Future  8. Atypical pigmented skin lesion  -     Tissue Exam; Future  -     Tissue Exam     We will increase her dose of Zoloft.  Patient to follow-up with me regarding how this is working for her    There are no Patient Instructions on file for this visit.    No follow-ups on file.    Subjective:      Patient ID: Jaja Griffith is a 78 y.o. female.    Chief Complaint   Patient presents with    Follow-up     6 month-   labs done 6/18- discuss medications - mammogram done 5/24    Skin Lesion     Patient states she has a wart on left side of back by her bra strap- Patient states it is painful and is catching on her clothes        HPI    Hyperlipidemia (Follow-Up): The patient states her hyperlipidemia has been under good control since the last visit. She has no comorbid illnesses.   Symptoms: denies chest pain,-denies intermittent leg claudication,-denies muscle pain-and-denies muscle weakness. Associated symptoms include no focal neurologic deficits-and-no memory loss.   Medications: the patient is adherent with her medication regimen.-the patient complains of medication side effects. The patient is doing well with her hyperlipidemia goals. the patient's LDL goal is <100 mg/dL.-the patient's last LDL was 72 mg/dL.-(5/2014).  February 2019, due for labs and has lab slip  August 2019, very well controlled  March 2020:  Not able to get labs done since they are closed right now however compliant with the medication and does not need refills  November 2021, had lab work done in September.   Nicely controlled levels.  No issueson simvastatin 20 mg daily and levels are well controlled.  No side effects.   DEc 2022: well controlled; no issues  June 2023, well controlled, no issues, remains very active  December 2023, significant improvement in lipid levels, taking simvastatin and tolerating well.  Has been very active  June 2024, lipid levels improved, no issues.  Patient remains very active     Gastroesophageal Reflux Disease (Follow-Up): The patient is being seen for follow-up of gastroesophageal reflux disease.  Is essentially resolved symptoms.  Not on anything on a regular basis. March 2020, well controlled November 2021, controlled.  Not on daily PPI  DEc 2022: stable   June 2023, no issues, as needed pantoprazole if necessary  December 2023, heartburn well-controlled, takes pantoprazole intermittently  June 2024, no issues heartburns well-controlled     Depression (Follow-Up): The patient states her depression has been stable since the last visit. She has no comorbid illnesses.   Interval Events: doing well but  recently dx with lung cancer after brain surgery and colonoscopy. She has had no significant interval events.   Interval Symptoms: improved depression,-improved depressed mood,-improved loss of interest or pleasure in activities,-stable insomnia,-denies feelings of worthlessness,-denies feelings of guilt,-denies trouble concentrating,-improved anxiety-and-denies sexual dysfunction.  February 2019,  passed away in the winter 2018 but she has a very large support system and is doing relatively well.  Taking it day by day  Associated symptoms include: no recent weight gain-and-no thoughts of suicide.   Social Support: the patient has good social support.   Medications: the patient is adherent with her medication regimen.-She denies medication side effects.  Pt has been on zoloft >5 years.  States she feels really stable on it and does not feel the need to go off of it August  2019, doing very well and does not require any medication changes  March 2020, doing very well with present dose Zoloft with no breakthrough symptoms.  No suicidal or homicidal ideations   April 2021, no HI or SI and tolerating medication well.  Symptoms are well controlled with no flare ups.  Does not need a change in dose.   November 2021, no HI or SI.  Doing well with Zoloft.  No breakthrough symptoms.  June 2023, well controlled off remains on Zoloft, does not want dose change, infrequently uses Xanax, does a lot of yard work  December 2023, no HI or SI, on Zoloft daily, minimal use of alprazolam  June 2024, patient having fluctuations of symptoms of depression and crying spells her daughter is going through a divorce after 14 years which was a surprise for everyone.  She would like an increase in her medications.  No HI or SI     Osteopenia of multiple sites.  Takes vitamin-D and calcium on a regular basis and is very active.  Up-to-date on bone density study.         The following portions of the patient's history were reviewed and updated as appropriate: allergies, current medications, past family history, past medical history, past social history, past surgical history and problem list.    Review of Systems      Constitutional:  Denies fever or chills   Eyes:  Denies double , blurry vision or eye pain  HENT:  Denies nasal congestion, sore throat or new hearing issues  Respiratory:  Denies cough or shortness of breath or wheezing  Cardiovascular:  Denies palpitations or chest pain  GI:  Denies abdominal pain, nausea, or vomiting, no loose stools, no reflux  Integument:  Denies rash , no open areas  Neurologic:  Denies headache or focal weakness, no dizziness  : no dysuria, or hematuria      Current Outpatient Medications   Medication Sig Dispense Refill    ALPRAZolam (XANAX) 0.5 mg tablet Take 1 tablet (0.5 mg total) by mouth daily as needed (prn) 30 tablet 0    B Complex Vitamins (B COMPLEX PO) Take 1  "capsule by mouth daily       CALCIUM PO Take 1,200 mg by mouth daily       Cholecalciferol (VITAMIN D3 PO) Take 1 capsule by mouth daily       Diclofenac Sodium (VOLTAREN) 1 % Apply 2 g topically 4 (four) times a day      gabapentin (NEURONTIN) 300 mg capsule TAKE 1 CAPSULE BY MOUTH EVERY DAY AT BEDTIME 90 capsule 1    pantoprazole (PROTONIX) 40 mg tablet TAKE 1 TABLET BY MOUTH EVERY DAY AS NEEDED 90 tablet 1    sertraline (ZOLOFT) 100 mg tablet TAKE 1 TABLET BY MOUTH EVERY DAY AT BEDTIME 90 tablet 1    sertraline (ZOLOFT) 25 mg tablet Take 1 tablet (25 mg total) by mouth daily In addition to 100 mg daily 90 tablet 1    simvastatin (ZOCOR) 20 mg tablet TAKE 1 TABLET BY MOUTH EVERY DAY AT BEDTIME 90 tablet 1     No current facility-administered medications for this visit.       Objective:    /74 (BP Location: Left arm, Patient Position: Sitting, Cuff Size: Standard)   Pulse 86   Temp 97.8 °F (36.6 °C) (Temporal)   Ht 5' 4.88\" (1.648 m)   Wt 81.6 kg (180 lb)   SpO2 96% Comment: room air  BMI 30.06 kg/m²        Physical Exam           Daryn Cristobal DO  "

## 2024-06-21 NOTE — PROGRESS NOTES
Shave lesion    Date/Time: 6/21/2024 9:30 AM    Performed by: Daryn Cristobal DO  Authorized by: Daryn Cristobal DO  Universal Protocol:  Consent: Verbal consent obtained. Written consent obtained.  Risks and benefits: risks, benefits and alternatives were discussed  Consent given by: patient  Patient understanding: patient states understanding of the procedure being performed  Patient consent: the patient's understanding of the procedure matches consent given  Procedure consent: procedure consent matches procedure scheduled  Required items: required blood products, implants, devices, and special equipment available  Patient identity confirmed: verbally with patient    Number of Lesions: 1  Lesion 1:     Body area: upper extremity    Upper extremity location: L shoulder    Initial size (mm): 4    Final defect size (mm): 5    Malignancy: benign lesion      Destruction method: shave removal      Comments:  Sample sent for pathology.  Post care instructions given to patient

## 2024-06-24 PROCEDURE — 88305 TISSUE EXAM BY PATHOLOGIST: CPT | Performed by: PATHOLOGY

## 2024-06-27 PROBLEM — B07.9 VERRUCA: Status: ACTIVE | Noted: 2024-06-27

## 2024-07-02 ENCOUNTER — TELEPHONE (OUTPATIENT)
Age: 79
End: 2024-07-02

## 2024-07-12 NOTE — TELEPHONE ENCOUNTER
Attempted call. Left detailed voicemail advising patient that she does not need to be seen in the office prior to a colonoscopy procedure unless she is symptomatic. She was referred after a recent positive cologuard test. Provided the office number for patient to call the office back to schedule a colonoscopy.

## 2024-07-12 NOTE — PROGRESS NOTES
Colon and Rectal Surgery   Jaja Griffith 78 y.o. female MRN [id number]  Encounter: [id number]  07/15/24 2:40 PM            Assessment: Jaja Griffith is a 78 y.o. female who had a positive Cologuard.      Plan:   Positive colorectal cancer screening using Cologuard test  The patient feels completely well.  She has no GI symptoms.  She has had colonoscopy in the past but has had Cologuard tests since then.  None of these were ever positive until recently.    I recommend colonoscopy to rule out any primary sources for the positive Cologuard.  She understands she may have polyps or other lesions.  We will investigate and, if the colon is normal, we can halt further screening.      Subjective     HPI    Jaja Griffith is a 78 y.o. female who is referred today by her PCP Dr. Daryn Cristobal after a positive cologuard test.     The patient's 4/2/24 cologuard was positive.     She reports that she has daily bowel movements with soft and formed stool. She denies rectal bleeding and abdominal pain.     Her last colonoscopy was performed on 07/18/2013 with a 5 year recall.    She denies a personal and family history of colon polyps.     Lab Results   Component Value Date    WBC 7.18 06/18/2024    HGB 14.3 06/18/2024    HCT 42.7 06/18/2024    MCV 96 06/18/2024     06/18/2024     Lab Results   Component Value Date    SODIUM 142 06/18/2024    K 4.3 06/18/2024     06/18/2024    CO2 26 06/18/2024    AGAP 10 06/18/2024    BUN 17 06/18/2024    CREATININE 0.68 06/18/2024    GLUF 111 (H) 06/18/2024    CALCIUM 9.6 06/18/2024    AST 19 06/18/2024    ALT 17 06/18/2024    ALKPHOS 49 06/18/2024    TP 6.7 06/18/2024    TBILI 0.52 06/18/2024    EGFR 85 06/18/2024     Historical Information   Past Medical History:   Diagnosis Date    Acid reflux     Anxiety     COVID-19 virus infection 01/10/2022    Hyperlipidemia     Skin tag     last assessed 4/21/17, resolved 10/2/17      Past Surgical History:   Procedure Laterality Date     COLONOSCOPY      KNEE ARTHROSCOPY Left     KNEE ARTHROSCOPY W/ MENISCAL REPAIR Left     TN ARTHRP KNE CONDYLE&PLATU MEDIAL&LAT COMPARTMENTS Left 10/24/2019    Procedure: ARTHROPLASTY KNEE TOTAL;  Surgeon: Yeyo Ames MD;  Location: [facility];  Service: Orthopedics    TONSILLECTOMY AND ADENOIDECTOMY         Meds/Allergies       Current Outpatient Medications:     ALPRAZolam (XANAX) 0.5 mg tablet, Take 1 tablet (0.5 mg total) by mouth daily as needed (prn), Disp: 30 tablet, Rfl: 0    B Complex Vitamins (B COMPLEX PO), Take 1 capsule by mouth daily , Disp: , Rfl:     CALCIUM PO, Take 1,200 mg by mouth daily , Disp: , Rfl:     Cholecalciferol (VITAMIN D3 PO), Take 1 capsule by mouth daily , Disp: , Rfl:     gabapentin (NEURONTIN) 300 mg capsule, TAKE 1 CAPSULE BY MOUTH EVERY DAY AT BEDTIME, Disp: 90 capsule, Rfl: 1    pantoprazole (PROTONIX) 40 mg tablet, TAKE 1 TABLET BY MOUTH EVERY DAY AS NEEDED, Disp: 90 tablet, Rfl: 1    sertraline (ZOLOFT) 100 mg tablet, TAKE 1 TABLET BY MOUTH EVERY DAY AT BEDTIME, Disp: 90 tablet, Rfl: 1    sertraline (ZOLOFT) 25 mg tablet, Take 1 tablet (25 mg total) by mouth daily In addition to 100 mg daily, Disp: 90 tablet, Rfl: 1    simvastatin (ZOCOR) 20 mg tablet, TAKE 1 TABLET BY MOUTH EVERY DAY AT BEDTIME, Disp: 90 tablet, Rfl: 1    Diclofenac Sodium (VOLTAREN) 1 %, Apply 2 g topically 4 (four) times a day (Patient not taking: Reported on 7/15/2024), Disp: , Rfl:   Allergies   Allergen Reactions    Tetracyclines & Related      Blisters, yeast infection       Social History   Social History     Substance and Sexual Activity   Drug Use No     Social History     Tobacco Use   Smoking Status Former    Current packs/day: 0.00    Average packs/day: 0.5 packs/day for 17.0 years (8.5 ttl pk-yrs)    Types: Cigarettes    Start date:     Quit date:     Years since quittin.5   Smokeless Tobacco Never         Family History   Problem Relation Age of Onset    Stroke Mother          "CVA    Hyperlipidemia Mother     Stroke Maternal Grandfather         CVA    Stroke Family         CVA    Other Family         breast disorders    Prostate cancer Father 75    Other Maternal Aunt         breast disorders     No Known Problems Sister     No Known Problems Daughter     No Known Problems Maternal Grandmother     No Known Problems Paternal Grandmother     No Known Problems Paternal Grandfather     No Known Problems Daughter     No Known Problems Son     Breast cancer Maternal Aunt 60    No Known Problems Maternal Aunt     No Known Problems Maternal Aunt     No Known Problems Maternal Aunt     No Known Problems Maternal Aunt     No Known Problems Paternal Aunt     No Known Problems Paternal Aunt          Review of Systems   Constitutional: Negative.    Respiratory: Negative.     Cardiovascular: Negative.    Gastrointestinal: Negative.        Objective   Current Vitals:  Vitals:    07/15/24 1419   BP: 102/72   Weight: 80.3 kg (177 lb)   Height: 5' 4.88\" (1.648 m)         Physical Exam  Constitutional:       Appearance: Normal appearance.   Cardiovascular:      Rate and Rhythm: Normal rate and regular rhythm.   Pulmonary:      Effort: Pulmonary effort is normal.      Breath sounds: Normal breath sounds.   Abdominal:      General: Abdomen is flat.      Palpations: Abdomen is soft. There is no mass.      Tenderness: There is no abdominal tenderness. There is no guarding.   Neurological:      General: No focal deficit present.      Mental Status: She is alert and oriented to person, place, and time.               "

## 2024-07-15 ENCOUNTER — PREP FOR PROCEDURE (OUTPATIENT)
Age: 79
End: 2024-07-15

## 2024-07-15 ENCOUNTER — TELEPHONE (OUTPATIENT)
Age: 79
End: 2024-07-15

## 2024-07-15 ENCOUNTER — OFFICE VISIT (OUTPATIENT)
Age: 79
End: 2024-07-15
Payer: COMMERCIAL

## 2024-07-15 VITALS
BODY MASS INDEX: 29.49 KG/M2 | DIASTOLIC BLOOD PRESSURE: 72 MMHG | HEIGHT: 65 IN | WEIGHT: 177 LBS | SYSTOLIC BLOOD PRESSURE: 102 MMHG

## 2024-07-15 DIAGNOSIS — R19.5 POSITIVE COLORECTAL CANCER SCREENING USING COLOGUARD TEST: Primary | ICD-10-CM

## 2024-07-15 DIAGNOSIS — R19.5 POSITIVE COLORECTAL CANCER SCREENING USING COLOGUARD TEST: ICD-10-CM

## 2024-07-15 PROCEDURE — 99203 OFFICE O/P NEW LOW 30 MIN: CPT | Performed by: COLON & RECTAL SURGERY

## 2024-07-15 NOTE — LETTER
Jaja Griffith  [address] [id number]        FLEXIBLE COLONOSCOPY INSTRUCTIONS  PLEASE NOTE...  AS OF JUNE 1, 2014, OUR OFFICE REQUIRES 72 HOURS NOTICE OF CANCELLATION/RESCHEDULE OF A PROCEDURE TO AVOID INCURRING A MISSED APPOINTMENT FEE.     Your Colonoscopy Procedure has been scheduled at:[address]. [address] [place] [address], [facility] [id number]     The Date of your Procedure is: September 18, 2024        The hospital facility will contact you the evening prior to your scheduled procedure with your arrival time.      Use the bowel preparation as directed.     Check with your family doctor if you are taking a blood thinner (Coumadin, Plavix, Xarelto, Pradaxa, Gingko biloba, Ginseng, Feverfew, [place]'s Wort).  We suggest stopping these for 3 days. Special instructions may be needed if you are taking aspirin or any aspirin-containing medication.  Check with your family physician.       If you are on DIABETIC MEDICATION (tablets or insulin) your doctor may make changes in your preparation.     Take all medications usual unless otherwise instructed.     As always, if you have any questions or concerns, feel free to call the office.  Our  staff will be happy to connect you with one of the nurses to answer any questions or address any concerns you have regarding your procedure.       Do not wear any jewelry the day of your procedure including wedding rings.     Arrangements must be made for a responsible party to drive you home from the procedure.  If you do not have a responsible family member or friend to drive you home, the procedure will not be done.  Vast or a taxi is not acceptable.     It is important you notify our office of any insurance changes prior to your procedure and, if necessary, supply us with referrals from your primary care physician.                COLONOSCOPY PREPARATION INSTRUCTIONS    Purchase (prescription not required):  · 238 gram bottle of Miralax®  (Glycolax®)  · 4 Dulcolax® (Bisacodyl) Laxative Tablets  · 64 oz. bottle of Gatorade® or your preference of a non-carbonated clear liquid - NOT RED OR PURPLE     One Day Prior to Colonoscopy Procedure  · Nothing to eat the day before your procedure, only clear liquids.  · It is important that you drink plenty of clear liquids throughout the day to prevent dehydration.    Clear Liquids include:  o Water/Iced Tea/Lemonade/Gatorade®/Black Coffee or tea (no milk or creamers  o Soft drinks: orange, ginger ale, cola, Pepsi®, Sprite®, 7Up®  o Enrique-Aid® (lemonade or orange flavors only)  o Strained fruit juices without pulp such as apple, white grape, white cranberry  o Jell-O®, lemon, lime or orange (no fruit or toppings)  o Popsicles, Italian Ice (No Ice Cream, sherbets, or fruit bars)  o Chicken or beef bouillon/broth  DO NOT EAT OR DRINK ANYTHING RED OR PURPLE  DO NOT DRINK ANY ALCOHOLIC BEVERAGES  DIABETIC PATIENTS: Consult your physician    At 4:00 pm, take (2) Dulcolax® (Bisacodyl) Laxative Tablets. Swallow the tablets whole with an 8 oz. glass of water. At 8:00 pm, take the additional (2) Dulcolax® (Bisacodyl) Laxative Tablets with 8 oz. of water. The package may direct you not to exceed (2) tablets at any time but for the purpose of this examination, you should take (4) total.    Mix the 238 gm of Miralax® in 64 oz. of Gatorade® and shake the solution until the Miralax® is dissolved. You will drink half (32 oz) of this solution this evening, beginning between 4 and 6 o'clock. Drink 8 oz glassfuls at your own pace. It may take several hours to drink the solution.    Remember to stay close to toilet facilities.    DAY OF COLONOSCOPY PROCEDURE    Five (5) hours before your procedure, drink the other half (32 oz) of the Miralax®/Gatorade® mixture within a two (2) hour period. This may require you to get up very early if you are scheduled for an early procedure.    NOTHING IS TO BE TAKEN BY MOUTH 3 HOURS PRIOR TO  PROCEDURE.     If you use an inhaler, please bring it with you to your procedure.

## 2024-07-15 NOTE — TELEPHONE ENCOUNTER
TR OV 7/15 +cologuard, last fc 2013, BMI 29       Scheduled TR 9/18 MARIA M   Handed PW to patient

## 2024-07-15 NOTE — ASSESSMENT & PLAN NOTE
The patient feels completely well.  She has no GI symptoms.  She has had colonoscopy in the past but has had Cologuard tests since then.  None of these were ever positive until recently.    I recommend colonoscopy to rule out any primary sources for the positive Cologuard.  She understands she may have polyps or other lesions.  We will investigate and, if the colon is normal, we can halt further screening.

## 2024-10-07 DIAGNOSIS — F41.9 ANXIETY: ICD-10-CM

## 2024-10-07 RX ORDER — SERTRALINE HYDROCHLORIDE 100 MG/1
100 TABLET, FILM COATED ORAL
Qty: 90 TABLET | Refills: 1 | Status: SHIPPED | OUTPATIENT
Start: 2024-10-07

## 2024-10-09 DIAGNOSIS — F41.9 ANXIETY: ICD-10-CM

## 2024-10-09 RX ORDER — SERTRALINE HYDROCHLORIDE 25 MG/1
25 TABLET, FILM COATED ORAL DAILY
Qty: 90 TABLET | Refills: 0 | Status: SHIPPED | OUTPATIENT
Start: 2024-10-09 | End: 2025-04-07

## 2024-10-09 NOTE — TELEPHONE ENCOUNTER
Patient stating she needs a new script sent to pharmacy, even though previous prescription had refills.     Reason for call:   [x] Refill   [] Prior Auth  [] Other:     Office:   [x] PCP/Provider - Daryn Cristobal DO   [] Specialty/Provider -     Medication: sertraline (ZOLOFT) 25 mg tablet / Take 1 tablet (25 mg total) by mouth daily In addition to 100 mg daily     Pharmacy: [id number] - [place], [place] - [address]     Does the patient have enough for 3 days?   [] Yes   [x] No - Send as HP to POD     Instructions: This plan will send the code FBSE to the PM system.  DO NOT or CHANGE the price. Detail Level: Simple Price (Do Not Change): 0.00

## 2024-11-28 DIAGNOSIS — G56.03 BILATERAL CARPAL TUNNEL SYNDROME: ICD-10-CM

## 2024-11-28 DIAGNOSIS — K21.9 GASTROESOPHAGEAL REFLUX DISEASE WITHOUT ESOPHAGITIS: ICD-10-CM

## 2024-11-28 DIAGNOSIS — E78.5 HYPERLIPIDEMIA, UNSPECIFIED HYPERLIPIDEMIA TYPE: ICD-10-CM

## 2024-11-29 RX ORDER — SIMVASTATIN 20 MG
20 TABLET ORAL
Qty: 90 TABLET | Refills: 1 | Status: SHIPPED | OUTPATIENT
Start: 2024-11-29

## 2024-11-29 RX ORDER — GABAPENTIN 300 MG/1
300 CAPSULE ORAL
Qty: 90 CAPSULE | Refills: 1 | Status: SHIPPED | OUTPATIENT
Start: 2024-11-29

## 2024-11-29 RX ORDER — PANTOPRAZOLE SODIUM 40 MG/1
40 TABLET, DELAYED RELEASE ORAL DAILY PRN
Qty: 90 TABLET | Refills: 1 | Status: SHIPPED | OUTPATIENT
Start: 2024-11-29

## 2024-12-05 ENCOUNTER — RA CDI HCC (OUTPATIENT)
Dept: OTHER | Facility: HOSPITAL | Age: 79
End: 2024-12-05

## 2024-12-09 ENCOUNTER — APPOINTMENT (OUTPATIENT)
Dept: LAB | Age: 79
End: 2024-12-09
Payer: COMMERCIAL

## 2024-12-09 DIAGNOSIS — E78.00 HYPERCHOLESTEROLEMIA: ICD-10-CM

## 2024-12-09 DIAGNOSIS — E55.9 VITAMIN D DEFICIENCY: ICD-10-CM

## 2024-12-09 LAB
25(OH)D3 SERPL-MCNC: 33.6 NG/ML (ref 30–100)
ALBUMIN SERPL BCG-MCNC: 4.2 G/DL (ref 3.5–5)
ALP SERPL-CCNC: 50 U/L (ref 34–104)
ALT SERPL W P-5'-P-CCNC: 14 U/L (ref 7–52)
ANION GAP SERPL CALCULATED.3IONS-SCNC: 6 MMOL/L (ref 4–13)
AST SERPL W P-5'-P-CCNC: 16 U/L (ref 13–39)
BILIRUB SERPL-MCNC: 0.53 MG/DL (ref 0.2–1)
BUN SERPL-MCNC: 17 MG/DL (ref 5–25)
CALCIUM SERPL-MCNC: 9.3 MG/DL (ref 8.4–10.2)
CHLORIDE SERPL-SCNC: 109 MMOL/L (ref 96–108)
CHOLEST SERPL-MCNC: 182 MG/DL (ref ?–200)
CO2 SERPL-SCNC: 27 MMOL/L (ref 21–32)
CREAT SERPL-MCNC: 0.66 MG/DL (ref 0.6–1.3)
GFR SERPL CREATININE-BSD FRML MDRD: 85 ML/MIN/1.73SQ M
GLUCOSE P FAST SERPL-MCNC: 99 MG/DL (ref 65–99)
HDLC SERPL-MCNC: 79 MG/DL
LDLC SERPL CALC-MCNC: 90 MG/DL (ref 0–100)
POTASSIUM SERPL-SCNC: 4.4 MMOL/L (ref 3.5–5.3)
PROT SERPL-MCNC: 6.6 G/DL (ref 6.4–8.4)
SODIUM SERPL-SCNC: 142 MMOL/L (ref 135–147)
TRIGL SERPL-MCNC: 67 MG/DL (ref ?–150)

## 2024-12-09 PROCEDURE — 36415 COLL VENOUS BLD VENIPUNCTURE: CPT

## 2024-12-09 PROCEDURE — 82306 VITAMIN D 25 HYDROXY: CPT

## 2024-12-09 PROCEDURE — 80061 LIPID PANEL: CPT

## 2024-12-09 PROCEDURE — 80053 COMPREHEN METABOLIC PANEL: CPT

## 2024-12-12 ENCOUNTER — OFFICE VISIT (OUTPATIENT)
Dept: INTERNAL MEDICINE CLINIC | Age: 79
End: 2024-12-12
Payer: COMMERCIAL

## 2024-12-12 VITALS
HEART RATE: 89 BPM | WEIGHT: 180 LBS | DIASTOLIC BLOOD PRESSURE: 70 MMHG | BODY MASS INDEX: 30.73 KG/M2 | OXYGEN SATURATION: 99 % | HEIGHT: 64 IN | TEMPERATURE: 98 F | SYSTOLIC BLOOD PRESSURE: 114 MMHG

## 2024-12-12 DIAGNOSIS — R19.5 POSITIVE COLORECTAL CANCER SCREENING USING COLOGUARD TEST: ICD-10-CM

## 2024-12-12 DIAGNOSIS — K21.9 GASTROESOPHAGEAL REFLUX DISEASE WITHOUT ESOPHAGITIS: ICD-10-CM

## 2024-12-12 DIAGNOSIS — Z00.00 ENCOUNTER FOR ANNUAL WELLNESS VISIT (AWV) IN MEDICARE PATIENT: ICD-10-CM

## 2024-12-12 DIAGNOSIS — M85.89 OSTEOPENIA OF MULTIPLE SITES: ICD-10-CM

## 2024-12-12 DIAGNOSIS — E78.00 HYPERCHOLESTEROLEMIA: Primary | ICD-10-CM

## 2024-12-12 DIAGNOSIS — F41.9 ANXIETY: ICD-10-CM

## 2024-12-12 PROBLEM — G56.03 BILATERAL CARPAL TUNNEL SYNDROME: Status: RESOLVED | Noted: 2020-03-16 | Resolved: 2024-12-12

## 2024-12-12 PROBLEM — M62.838 MUSCLE SPASM: Status: RESOLVED | Noted: 2024-01-31 | Resolved: 2024-12-12

## 2024-12-12 PROBLEM — R73.09 ABNORMAL GLUCOSE: Status: RESOLVED | Noted: 2020-03-30 | Resolved: 2024-12-12

## 2024-12-12 PROCEDURE — G0439 PPPS, SUBSEQ VISIT: HCPCS | Performed by: FAMILY MEDICINE

## 2024-12-12 PROCEDURE — 99214 OFFICE O/P EST MOD 30 MIN: CPT | Performed by: FAMILY MEDICINE

## 2024-12-12 RX ORDER — SERTRALINE HYDROCHLORIDE 25 MG/1
25 TABLET, FILM COATED ORAL DAILY
Qty: 90 TABLET | Refills: 0 | Status: SHIPPED | OUTPATIENT
Start: 2024-12-12 | End: 2025-06-10

## 2024-12-12 NOTE — PROGRESS NOTES
Assessment/Plan:    1. Hypercholesterolemia  Assessment & Plan:         Orders:  -     Lipid Panel with Direct LDL reflex; Future; Expected date: 06/12/2025  -     Comprehensive metabolic panel; Future; Expected date: 06/12/2025  -     CBC and differential; Future; Expected date: 06/12/2025  2. Gastroesophageal reflux disease without esophagitis  Assessment & Plan:         3. Osteopenia of multiple sites  Assessment & Plan:    Orders:    DXA bone density spine hip and pelvis; Future    Orders:  -     DXA bone density spine hip and pelvis; Future; Expected date: 07/12/2025  4. Anxiety  -     sertraline (ZOLOFT) 25 mg tablet; Take 1 tablet (25 mg total) by mouth daily In addition to 100 mg daily  5. Encounter for annual wellness visit (AWV) in Medicare patient  Assessment & Plan:         6. Positive colorectal cancer screening using Cologuard test  Assessment & Plan:                 Patient Instructions   Medicare Preventive Visit Patient Instructions  Thank you for completing your Welcome to Medicare Visit or Medicare Annual Wellness Visit today. Your next wellness visit will be due in one year (12/13/2025).  The screening/preventive services that you may require over the next 5-10 years are detailed below. Some tests may not apply to you based off risk factors and/or age. Screening tests ordered at today's visit but not completed yet may show as past due. Also, please note that scanned in results may not display below.  Preventive Screenings:  Service Recommendations Previous Testing/Comments   Colorectal Cancer Screening  * Colonoscopy    * Fecal Occult Blood Test (FOBT)/Fecal Immunochemical Test (FIT)  * Fecal DNA/Cologuard Test  * Flexible Sigmoidoscopy Age: 45-75 years old   Colonoscopy: every 10 years (may be performed more frequently if at higher risk)  OR  FOBT/FIT: every 1 year  OR  Cologuard: every 3 years  OR  Sigmoidoscopy: every 5 years  Screening may be recommended earlier than age 45 if at higher risk  for colorectal cancer. Also, an individualized decision between you and your healthcare provider will decide whether screening between the ages of 76-85 would be appropriate. Colonoscopy: 07/18/2013  FOBT/FIT: Not on file  Cologuard: 04/02/2024  Sigmoidoscopy: Not on file          Breast Cancer Screening Age: 40+ years old  Frequency: every 1-2 years  Not required if history of left and right mastectomy Mammogram: 05/24/2024    Screening Current   Cervical Cancer Screening Between the ages of 21-29, pap smear recommended once every 3 years.   Between the ages of 30-65, can perform pap smear with HPV co-testing every 5 years.   Recommendations may differ for women with a history of total hysterectomy, cervical cancer, or abnormal pap smears in past. Pap Smear: Not on file    Screening Not Indicated   Hepatitis C Screening Once for adults born between 1945 and 1965  More frequently in patients at high risk for Hepatitis C Hep C Antibody: 02/08/2019    Screening Current   Diabetes Screening 1-2 times per year if you're at risk for diabetes or have pre-diabetes Fasting glucose: 99 mg/dL (12/9/2024)  A1C: 5.7 % (6/18/2024)  Screening Current   Cholesterol Screening Once every 5 years if you don't have a lipid disorder. May order more often based on risk factors. Lipid panel: 12/09/2024    Screening Not Indicated  History Lipid Disorder     Other Preventive Screenings Covered by Medicare:  Abdominal Aortic Aneurysm (AAA) Screening: covered once if your at risk. You're considered to be at risk if you have a family history of AAA.  Lung Cancer Screening: covers low dose CT scan once per year if you meet all of the following conditions: (1) Age 55-77; (2) No signs or symptoms of lung cancer; (3) Current smoker or have quit smoking within the last 15 years; (4) You have a tobacco smoking history of at least 20 pack years (packs per day multiplied by number of years you smoked); (5) You get a written order from a healthcare  provider.  Glaucoma Screening: covered annually if you're considered high risk: (1) You have diabetes OR (2) Family history of glaucoma OR (3)  aged 50 and older OR (4)  American aged 65 and older  Osteoporosis Screening: covered every 2 years if you meet one of the following conditions: (1) You're estrogen deficient and at risk for osteoporosis based off medical history and other findings; (2) Have a vertebral abnormality; (3) On glucocorticoid therapy for more than 3 months; (4) Have primary hyperparathyroidism; (5) On osteoporosis medications and need to assess response to drug therapy.   Last bone density test (DXA Scan): 07/11/2023.  HIV Screening: covered annually if you're between the age of 15-65. Also covered annually if you are younger than 15 and older than 65 with risk factors for HIV infection. For pregnant patients, it is covered up to 3 times per pregnancy.    Immunizations:  Immunization Recommendations   Influenza Vaccine Annual influenza vaccination during flu season is recommended for all persons aged >= 6 months who do not have contraindications   Pneumococcal Vaccine   * Pneumococcal conjugate vaccine = PCV13 (Prevnar 13), PCV15 (Vaxneuvance), PCV20 (Prevnar 20)  * Pneumococcal polysaccharide vaccine = PPSV23 (Pneumovax) Adults 19-65 yo with certain risk factors or if 65+ yo  If never received any pneumonia vaccine: recommend Prevnar 20 (PCV20)  Give PCV20 if previously received 1 dose of PCV13 or PPSV23   Hepatitis B Vaccine 3 dose series if at intermediate or high risk (ex: diabetes, end stage renal disease, liver disease)   Respiratory syncytial virus (RSV) Vaccine - COVERED BY MEDICARE PART D  * RSVPreF3 (Arexvy) CDC recommends that adults 60 years of age and older may receive a single dose of RSV vaccine using shared clinical decision-making (SCDM)   Tetanus (Td) Vaccine - COST NOT COVERED BY MEDICARE PART B Following completion of primary series, a booster dose  should be given every 10 years to maintain immunity against tetanus. Td may also be given as tetanus wound prophylaxis.   Tdap Vaccine - COST NOT COVERED BY MEDICARE PART B Recommended at least once for all adults. For pregnant patients, recommended with each pregnancy.   Shingles Vaccine (Shingrix) - COST NOT COVERED BY MEDICARE PART B  2 shot series recommended in those 19 years and older who have or will have weakened immune systems or those 50 years and older     Health Maintenance Due:      Topic Date Due    Colorectal Cancer Screening  04/03/2024    Breast Cancer Screening: Mammogram  05/24/2025    Hepatitis C Screening  Completed     Immunizations Due:  There are no preventive care reminders to display for this patient.  Advance Directives   What are advance directives?  Advance directives are legal documents that state your wishes and plans for medical care. These plans are made ahead of time in case you lose your ability to make decisions for yourself. Advance directives can apply to any medical decision, such as the treatments you want, and if you want to donate organs.   What are the types of advance directives?  There are many types of advance directives, and each state has rules about how to use them. You may choose a combination of any of the following:  Living will:  This is a written record of the treatment you want. You can also choose which treatments you do not want, which to limit, and which to stop at a certain time. This includes surgery, medicine, IV fluid, and tube feedings.   Durable power of  for healthcare (DPAHC):  This is a written record that states who you want to make healthcare choices for you when you are unable to make them for yourself. This person, called a proxy, is usually a family member or a friend. You may choose more than 1 proxy.  Do not resuscitate (DNR) order:  A DNR order is used in case your heart stops beating or you stop breathing. It is a request not to have  certain forms of treatment, such as CPR. A DNR order may be included in other types of advance directives.  Medical directive:  This covers the care that you want if you are in a coma, near death, or unable to make decisions for yourself. You can list the treatments you want for each condition. Treatment may include pain medicine, surgery, blood transfusions, dialysis, IV or tube feedings, and a ventilator (breathing machine).  Values history:  This document has questions about your views, beliefs, and how you feel and think about life. This information can help others choose the care that you would choose.  Why are advance directives important?  An advance directive helps you control your care. Although spoken wishes may be used, it is better to have your wishes written down. Spoken wishes can be misunderstood, or not followed. Treatments may be given even if you do not want them. An advance directive may make it easier for your family to make difficult choices about your care.   Fall Prevention    Fall prevention  includes ways to make your home and other areas safer. It also includes ways you can move more carefully to prevent a fall. Health conditions that cause changes in your blood pressure, vision, or muscle strength and coordination may increase your risk for falls. Medicines may also increase your risk for falls if they make you dizzy, weak, or sleepy.   Fall prevention tips:   Stand or sit up slowly.    Use assistive devices as directed.    Wear shoes that fit well and have soles that .    Wear a personal alarm.    Stay active.    Manage your medical conditions.    Home Safety Tips:  Add items to prevent falls in the bathroom.    Keep paths clear.    Install bright lights in your home.    Keep items you use often on shelves within reach.    Paint or place reflective tape on the edges of your stairs.    Urinary Incontinence   Urinary incontinence (UI)  is when you lose control of your bladder. UI  develops because your bladder cannot store or empty urine properly. The 3 most common types of UI are stress incontinence, urge incontinence, or both.  Medicines:   May be given to help strengthen your bladder control. Report any side effects of medication to your healthcare provider.  Do pelvic muscle exercises often:  Your pelvic muscles help you stop urinating. Squeeze these muscles tight for 5 seconds, then relax for 5 seconds. Gradually work up to squeezing for 10 seconds. Do 3 sets of 15 repetitions a day, or as directed. This will help strengthen your pelvic muscles and improve bladder control.  Train your bladder:  Go to the bathroom at set times, such as every 2 hours, even if you do not feel the urge to go. You can also try to hold your urine when you feel the urge to go. For example, hold your urine for 5 minutes when you feel the urge to go. As that becomes easier, hold your urine for 10 minutes.   Self-care:   Keep a UI record.  Write down how often you leak urine and how much you leak. Make a note of what you were doing when you leaked urine.  Drink liquids as directed. You may need to limit the amount of liquid you drink to help control your urine leakage. Do not drink any liquid right before you go to bed. Limit or do not have drinks that contain caffeine or alcohol.   Prevent constipation.  Eat a variety of high-fiber foods. Good examples are high-fiber cereals, beans, vegetables, and whole-grain breads. Walking is the best way to trigger your intestines to have a bowel movement.  Exercise regularly and maintain a healthy weight.  Weight loss and exercise will decrease pressure on your bladder and help you control your leakage.   Use a catheter as directed  to help empty your bladder. A catheter is a tiny, plastic tube that is put into your bladder to drain your urine.   Go to behavior therapy as directed.  Behavior therapy may be used to help you learn to control your urge to urinate.    Weight  Management   Why it is important to manage your weight:  Being overweight increases your risk of health conditions such as heart disease, high blood pressure, type 2 diabetes, and certain types of cancer. It can also increase your risk for osteoarthritis, sleep apnea, and other respiratory problems. Aim for a slow, steady weight loss. Even a small amount of weight loss can lower your risk of health problems.  How to lose weight safely:  A safe and healthy way to lose weight is to eat fewer calories and get regular exercise. You can lose up about 1 pound a week by decreasing the number of calories you eat by 500 calories each day.   Healthy meal plan for weight management:  A healthy meal plan includes a variety of foods, contains fewer calories, and helps you stay healthy. A healthy meal plan includes the following:  Eat whole-grain foods more often.  A healthy meal plan should contain fiber. Fiber is the part of grains, fruits, and vegetables that is not broken down by your body. Whole-grain foods are healthy and provide extra fiber in your diet. Some examples of whole-grain foods are whole-wheat breads and pastas, oatmeal, brown rice, and bulgur.  Eat a variety of vegetables every day.  Include dark, leafy greens such as spinach, kale, simeon greens, and mustard greens. Eat yellow and orange vegetables such as carrots, sweet potatoes, and winter squash.   Eat a variety of fruits every day.  Choose fresh or canned fruit (canned in its own juice or light syrup) instead of juice. Fruit juice has very little or no fiber.  Eat low-fat dairy foods.  Drink fat-free (skim) milk or 1% milk. Eat fat-free yogurt and low-fat cottage cheese. Try low-fat cheeses such as mozzarella and other reduced-fat cheeses.  Choose meat and other protein foods that are low in fat.  Choose beans or other legumes such as split peas or lentils. Choose fish, skinless poultry (chicken or turkey), or lean cuts of red meat (beef or pork). Before  you cook meat or poultry, cut off any visible fat.   Use less fat and oil.  Try baking foods instead of frying them. Add less fat, such as margarine, sour cream, regular salad dressing and mayonnaise to foods. Eat fewer high-fat foods. Some examples of high-fat foods include french fries, doughnuts, ice cream, and cakes.  Eat fewer sweets.  Limit foods and drinks that are high in sugar. This includes candy, cookies, regular soda, and sweetened drinks.  Exercise:  Exercise at least 30 minutes per day on most days of the week. Some examples of exercise include walking, biking, dancing, and swimming. You can also fit in more physical activity by taking the stairs instead of the elevator or parking farther away from stores. Ask your healthcare provider about the best exercise plan for you.      © Copyright [employer] 2018 Information is for End User's use only and may not be sold, redistributed or otherwise used for commercial purposes. All illustrations and images included in CareNotes® are the copyrighted property of [employer] or [employer]         Return in about 6 months (around 6/12/2025).    Subjective:      Patient ID: Jaja Griffith is a 79 y.o. female.    Chief Complaint   Patient presents with    Medicare Wellness Visit     Sub Medicare Wellness Visit     Follow-up     6 month follow up.        HM     Declined Colorectal Cancer Screening at this time. Discuss with provider.          HPI      Hyperlipidemia (Follow-Up): The patient states her hyperlipidemia has been under good control since the last visit. She has no comorbid illnesses.   Symptoms: denies chest pain,-denies intermittent leg claudication,-denies muscle pain-and-denies muscle weakness. Associated symptoms include no focal neurologic deficits-and-no memory loss.   Medications: the patient is adherent with her medication regimen.-the patient complains of medication side effects. The patient is doing well with her hyperlipidemia  goals. the patient's LDL goal is <100 mg/dL.-the patient's last LDL was 72 mg/dL.-(5/2014).  February 2019, due for labs and has lab slip  August 2019, very well controlled  March 2020:  Not able to get labs done since they are closed right now however compliant with the medication and does not need refills  November 2021, had lab work done in September.  Nicely controlled levels.  No issueson simvastatin 20 mg daily and levels are well controlled.  No side effects.   DEc 2022: well controlled; no issues  June 2023, well controlled, no issues, remains very active  December 2023, significant improvement in lipid levels, taking simvastatin and tolerating well.  Has been very active  June 2024, lipid levels improved, no issues.  Patient remains very active  December 2024, lipid levels well-controlled, tolerating medications remains very active     Gastroesophageal Reflux Disease (Follow-Up): The patient is being seen for follow-up of gastroesophageal reflux disease.  Is essentially resolved symptoms.  Not on anything on a regular basis. March 2020, well controlled November 2021, controlled.  Not on daily PPI  DEc 2022: stable   June 2023, no issues, as needed pantoprazole if necessary  December 2023, heartburn well-controlled, takes pantoprazole intermittently  June 2024, no issues heartburns well-controlled  December 2024, GERD well-controlled, no new issues, takes medications as needed     Depression (Follow-Up): The patient states her depression has been stable since the last visit. She has no comorbid illnesses.   Interval Events: doing well but  recently dx with lung cancer after brain surgery and colonoscopy. She has had no significant interval events.   Interval Symptoms: improved depression,-improved depressed mood,-improved loss of interest or pleasure in activities,-stable insomnia,-denies feelings of worthlessness,-denies feelings of guilt,-denies trouble concentrating,-improved anxiety-and-denies  sexual dysfunction.  February 2019,  passed away in the winter 2018 but she has a very large support system and is doing relatively well.  Taking it day by day  Associated symptoms include: no recent weight gain-and-no thoughts of suicide.   Social Support: the patient has good social support.   Medications: the patient is adherent with her medication regimen.-She denies medication side effects.  Pt has been on zoloft >5 years.  States she feels really stable on it and does not feel the need to go off of it August 2019, doing very well and does not require any medication changes  March 2020, doing very well with present dose Zoloft with no breakthrough symptoms.  No suicidal or homicidal ideations   April 2021, no HI or SI and tolerating medication well.  Symptoms are well controlled with no flare ups.  Does not need a change in dose.   November 2021, no HI or SI.  Doing well with Zoloft.  No breakthrough symptoms.  June 2023, well controlled off remains on Zoloft, does not want dose change, infrequently uses Xanax, does a lot of yard work  December 2023, no HI or SI, on Zoloft daily, minimal use of alprazolam  June 2024, patient having fluctuations of symptoms of depression and crying spells her daughter is going through a divorce after 14 years which was a surprise for everyone.  She would like an increase in her medications.  No HI or SI  December 2024, doing well with Zoloft does not want a dose change, no HI or SI      Osteopenia of multiple sites.  Takes vitamin-D and calcium on a regular basis and is very active.  Up-to-date on bone density study.  December 2024, up-to-date, takes calcium and vitamin D and remains very active      The following portions of the patient's history were reviewed and updated as appropriate: allergies, current medications, past family history, past medical history, past social history, past surgical history and problem list.    Review of Systems      Constitutional:  Denies  "fever or chills   Eyes:  Denies double , blurry vision or eye pain  HENT:  Denies nasal congestion, sore throat or new hearing issues  Respiratory:  Denies cough or shortness of breath or wheezing  Cardiovascular:  Denies palpitations or chest pain  GI:  Denies abdominal pain, nausea, or vomiting, no loose stools, no reflux  Integument:  Denies rash , no open areas  Neurologic:  Denies headache or focal weakness, no dizziness  : no dysuria, or hematuria      Current Outpatient Medications   Medication Sig Dispense Refill    ALPRAZolam (XANAX) 0.5 mg tablet Take 1 tablet (0.5 mg total) by mouth daily as needed (prn) 30 tablet 0    B Complex Vitamins (B COMPLEX PO) Take 1 capsule by mouth daily       CALCIUM PO Take 1,200 mg by mouth daily       Cholecalciferol (VITAMIN D3 PO) Take 1 capsule by mouth daily       Diclofenac Sodium (VOLTAREN) 1 % Apply 2 g topically 4 (four) times a day      gabapentin (NEURONTIN) 300 mg capsule TAKE 1 CAPSULE BY MOUTH EVERY DAY AT BEDTIME 90 capsule 1    pantoprazole (PROTONIX) 40 mg tablet TAKE 1 TABLET BY MOUTH EVERY DAY AS NEEDED 90 tablet 1    sertraline (ZOLOFT) 100 mg tablet TAKE 1 TABLET BY MOUTH EVERY DAY AT BEDTIME 90 tablet 1    sertraline (ZOLOFT) 25 mg tablet Take 1 tablet (25 mg total) by mouth daily In addition to 100 mg daily 90 tablet 0    simvastatin (ZOCOR) 20 mg tablet TAKE 1 TABLET BY MOUTH EVERY DAY AT BEDTIME 90 tablet 1     No current facility-administered medications for this visit.       Objective:    /70 (BP Location: Left arm, Patient Position: Sitting, Cuff Size: Adult)   Pulse 89   Temp 98 °F (36.7 °C)   Ht 5' 4\" (1.626 m)   Wt 81.6 kg (180 lb)   SpO2 99%   BMI 30.90 kg/m²        Physical Exam      Constitutional:  Well developed, well nourished, no acute distress, non-toxic appearance   Eyes:  PERRL, conjunctiva normal , non icteric sclera  HENT:  Atraumatic, oropharynx moist. Neck-  supple   Respiratory:  CTA b/l, normal breath sounds, no " rales, no wheezing   Cardiovascular:  RRR, no murmurs, no LE edema b/l  GI:  Soft, nondistended, normal bowel sounds x 4, nontender, no organomegaly, no mass, no rebound, no guarding   Neurologic:  no focal deficits noted   Psychiatric:  Speech and behavior appropriate , AAO x 3      Daryn Cristobal DO

## 2024-12-12 NOTE — PROGRESS NOTES
Name: Jaja Griffith      : 1945      MRN: [id number]  Encounter Provider: Daryn Cristobal DO  Encounter Date: 2024   Encounter department: [facility]    Assessment & Plan  Hypercholesterolemia         Gastroesophageal reflux disease without esophagitis         Osteopenia of multiple sites    Orders:    DXA bone density spine hip and pelvis; Future    Anxiety    Orders:    sertraline (ZOLOFT) 25 mg tablet; Take 1 tablet (25 mg total) by mouth daily In addition to 100 mg daily    Encounter for annual wellness visit (AWV) in Medicare patient         Positive colorectal cancer screening using Cologuard test            Preventive health issues were discussed with patient, and age appropriate screening tests were ordered as noted in patient's After Visit Summary. Personalized health advice and appropriate referrals for health education or preventive services given if needed, as noted in patient's After Visit Summary.    History of Present Illness     HPI   Patient Care Team:  Daryn Cristobal DO as PCP - General    Review of Systems  Medical History Reviewed by provider this encounter:  Tobacco  Allergies  Meds  Problems  Med Hx  Surg Hx  Fam Hx       Annual Wellness Visit Questionnaire   Jaja is here for her Subsequent Wellness visit.     Health Risk Assessment:   Patient rates overall health as very good. Patient feels that their physical health rating is same. Patient is satisfied with their life. Eyesight was rated as same. Hearing was rated as same. Patient feels that their emotional and mental health rating is same. Patients states they are never, rarely angry. Patient states they are sometimes unusually tired/fatigued. Pain experienced in the last 7 days has been some. Patient's pain rating has been 3/10. Patient states that she has experienced no weight loss or gain in last 6 months.     Depression Screening:   PHQ-9 Score: 0      Fall Risk Screening:   In the  past year, patient has experienced: history of falling in past year    Number of falls: 1  Injured during fall?: No    Feels unsteady when standing or walking?: Yes    Worried about falling?: Yes      Urinary Incontinence Screening:   Patient has leaked urine accidently in the last six months.     Home Safety:  Patient does not have trouble with stairs inside or outside of their home. Patient has working smoke alarms and has working carbon monoxide detector. Home safety hazards include: none.     Nutrition:   Current diet is Regular.     Medications:   Patient is currently taking over-the-counter supplements. OTC medications include: see medication list. Patient is able to manage medications.     Activities of Daily Living (ADLs)/Instrumental Activities of Daily Living (IADLs):   Walk and transfer into and out of bed and chair?: Yes  Dress and groom yourself?: Yes    Bathe or shower yourself?: Yes    Feed yourself? Yes  Do your laundry/housekeeping?: Yes  Manage your money, pay your bills and track your expenses?: Yes  Make your own meals?: Yes    Do your own shopping?: Yes    Previous Hospitalizations:   Any hospitalizations or ED visits within the last 12 months?: No      Advance Care Planning:   Living will: Yes    Durable POA for healthcare: Yes    Advanced directive: Yes      Comments: Her children    Cognitive Screening:   Provider or family/friend/caregiver concerned regarding cognition?: No    PREVENTIVE SCREENINGS      Cardiovascular Screening:    General: Screening Not Indicated and History Lipid Disorder      Diabetes Screening:     General: Screening Current      Breast Cancer Screening:     General: Screening Current      Cervical Cancer Screening:    General: Screening Not Indicated      Lung Cancer Screening:     General: Screening Not Indicated      Hepatitis C Screening:    General: Screening Current    Screening, Brief Intervention, and Referral to Treatment (SBIRT)    Screening  Typical number of  "drinks in a day: 1  Typical number of drinks in a week: 3  Interpretation: Low risk drinking behavior.    Single Item Drug Screening:  How often have you used an illegal drug (including marijuana) or a prescription medication for non-medical reasons in the past year? never    Single Item Drug Screen Score: 0  Interpretation: Negative screen for possible drug use disorder    Brief Intervention  Alcohol & drug use screenings were reviewed. No concerns regarding substance use disorder identified.     Other Counseling Topics:   Car/seat belt/driving safety, skin self-exam, sunscreen and calcium and vitamin D intake and regular weightbearing exercise.     Social Drivers of Health     Financial Resource Strain: Low Risk  (12/7/2023)    Overall Financial Resource Strain (CARDIA)     Difficulty of Paying Living Expenses: Not hard at all   Food Insecurity: No Food Insecurity (12/12/2024)    Hunger Vital Sign     Worried About Running Out of Food in the Last Year: Never true     Ran Out of Food in the Last Year: Never true   Transportation Needs: No Transportation Needs (12/12/2024)    PRAPARE - Transportation     Lack of Transportation (Medical): No     Lack of Transportation (Non-Medical): No   Housing Stability: Low Risk  (12/12/2024)    Housing Stability Vital Sign     Unable to Pay for Housing in the Last Year: No     Number of Times Moved in the Last Year: 0     Homeless in the Last Year: No   Utilities: Not At Risk (12/12/2024)    [facility] Utilities     Threatened with loss of utilities: No     No results found.    Objective   /70 (BP Location: Left arm, Patient Position: Sitting, Cuff Size: Adult)   Pulse 89   Temp 98 °F (36.7 °C)   Ht 5' 4\" (1.626 m)   Wt 81.6 kg (180 lb)   SpO2 99%   BMI 30.90 kg/m²     Physical Exam    "

## 2024-12-12 NOTE — PATIENT INSTRUCTIONS

## 2025-01-11 PROBLEM — Z00.00 ENCOUNTER FOR ANNUAL WELLNESS VISIT (AWV) IN MEDICARE PATIENT: Status: RESOLVED | Noted: 2024-12-12 | Resolved: 2025-01-11

## 2025-04-16 DIAGNOSIS — F41.9 ANXIETY: ICD-10-CM

## 2025-04-16 RX ORDER — SERTRALINE HYDROCHLORIDE 25 MG/1
TABLET, FILM COATED ORAL
Qty: 90 TABLET | Refills: 1 | Status: SHIPPED | OUTPATIENT
Start: 2025-04-16

## 2025-05-31 DIAGNOSIS — E78.5 HYPERLIPIDEMIA, UNSPECIFIED HYPERLIPIDEMIA TYPE: ICD-10-CM

## 2025-05-31 DIAGNOSIS — G56.03 BILATERAL CARPAL TUNNEL SYNDROME: ICD-10-CM

## 2025-05-31 DIAGNOSIS — F41.9 ANXIETY: ICD-10-CM

## 2025-05-31 RX ORDER — SERTRALINE HYDROCHLORIDE 100 MG/1
100 TABLET, FILM COATED ORAL
Qty: 90 TABLET | Refills: 1 | Status: SHIPPED | OUTPATIENT
Start: 2025-05-31

## 2025-05-31 RX ORDER — GABAPENTIN 300 MG/1
300 CAPSULE ORAL
Qty: 90 CAPSULE | Refills: 1 | Status: SHIPPED | OUTPATIENT
Start: 2025-05-31

## 2025-05-31 RX ORDER — SIMVASTATIN 20 MG
20 TABLET ORAL
Qty: 90 TABLET | Refills: 1 | Status: SHIPPED | OUTPATIENT
Start: 2025-05-31

## 2025-06-03 ENCOUNTER — HOSPITAL ENCOUNTER (OUTPATIENT)
Dept: RADIOLOGY | Age: 80
Discharge: HOME | End: 2025-06-03
Payer: COMMERCIAL

## 2025-06-03 VITALS — BODY MASS INDEX: 29.02 KG/M2 | WEIGHT: 170 LBS | HEIGHT: 64 IN

## 2025-06-03 DIAGNOSIS — Z12.31 SCREENING MAMMOGRAM, ENCOUNTER FOR: ICD-10-CM

## 2025-06-03 PROCEDURE — 77067 SCR MAMMO BI INCL CAD: CPT

## 2025-06-03 PROCEDURE — 77063 BREAST TOMOSYNTHESIS BI: CPT

## 2025-06-12 ENCOUNTER — TELEPHONE (OUTPATIENT)
Age: 80
End: 2025-06-12

## 2025-06-12 NOTE — TELEPHONE ENCOUNTER
Called and spoke with patient. She stated someone from her dentist's office got back to her and she was able to get the antibiotic from them. She does not need anything else from our office.   She has her next dentist appointment in August.

## 2025-06-12 NOTE — TELEPHONE ENCOUNTER
Pt calling in because pt went to her dentist for an abscess in her tooth and she may need a root canal but the soonest available they had was 7/9 and she doesn't know what to do until than. Pt is asking if Dr. Cristobal would be able to prescribe an ABX to help her, she's been trying to call the dentist to see if they would but they haven't been answering.    Please advise if this is something PCP can approve or if pt needs an appt beforehand. Please contact pt if anything.

## 2025-07-02 ENCOUNTER — APPOINTMENT (OUTPATIENT)
Dept: LAB | Age: 80
End: 2025-07-02
Payer: COMMERCIAL

## 2025-07-02 DIAGNOSIS — E78.00 HYPERCHOLESTEROLEMIA: ICD-10-CM

## 2025-07-02 LAB
ALBUMIN SERPL BCG-MCNC: 4.1 G/DL (ref 3.5–5)
ALP SERPL-CCNC: 50 U/L (ref 34–104)
ALT SERPL W P-5'-P-CCNC: 18 U/L (ref 7–52)
ANION GAP SERPL CALCULATED.3IONS-SCNC: 9 MMOL/L (ref 4–13)
AST SERPL W P-5'-P-CCNC: 20 U/L (ref 13–39)
BASOPHILS # BLD AUTO: 0.05 THOUSANDS/ÂΜL (ref 0–0.1)
BASOPHILS NFR BLD AUTO: 1 % (ref 0–1)
BILIRUB SERPL-MCNC: 0.46 MG/DL (ref 0.2–1)
BUN SERPL-MCNC: 18 MG/DL (ref 5–25)
CALCIUM SERPL-MCNC: 9.4 MG/DL (ref 8.4–10.2)
CHLORIDE SERPL-SCNC: 106 MMOL/L (ref 96–108)
CHOLEST SERPL-MCNC: 188 MG/DL (ref ?–200)
CO2 SERPL-SCNC: 26 MMOL/L (ref 21–32)
CREAT SERPL-MCNC: 0.56 MG/DL (ref 0.6–1.3)
EOSINOPHIL # BLD AUTO: 0.18 THOUSAND/ÂΜL (ref 0–0.61)
EOSINOPHIL NFR BLD AUTO: 3 % (ref 0–6)
ERYTHROCYTE [DISTWIDTH] IN BLOOD BY AUTOMATED COUNT: 12.7 % (ref 11.6–15.1)
GFR SERPL CREATININE-BSD FRML MDRD: 90 ML/MIN/1.73SQ M
GLUCOSE P FAST SERPL-MCNC: 102 MG/DL (ref 65–99)
HCT VFR BLD AUTO: 40.4 % (ref 34.8–46.1)
HDLC SERPL-MCNC: 73 MG/DL
HGB BLD-MCNC: 13.4 G/DL (ref 11.5–15.4)
IMM GRANULOCYTES # BLD AUTO: 0.02 THOUSAND/UL (ref 0–0.2)
IMM GRANULOCYTES NFR BLD AUTO: 0 % (ref 0–2)
LDLC SERPL CALC-MCNC: 96 MG/DL (ref 0–100)
LYMPHOCYTES # BLD AUTO: 1.93 THOUSANDS/ÂΜL (ref 0.6–4.47)
LYMPHOCYTES NFR BLD AUTO: 30 % (ref 14–44)
MCH RBC QN AUTO: 32.3 PG (ref 26.8–34.3)
MCHC RBC AUTO-ENTMCNC: 33.2 G/DL (ref 31.4–37.4)
MCV RBC AUTO: 97 FL (ref 82–98)
MONOCYTES # BLD AUTO: 0.51 THOUSAND/ÂΜL (ref 0.17–1.22)
MONOCYTES NFR BLD AUTO: 8 % (ref 4–12)
NEUTROPHILS # BLD AUTO: 3.66 THOUSANDS/ÂΜL (ref 1.85–7.62)
NEUTS SEG NFR BLD AUTO: 58 % (ref 43–75)
NRBC BLD AUTO-RTO: 0 /100 WBCS
PLATELET # BLD AUTO: 211 THOUSANDS/UL (ref 149–390)
PMV BLD AUTO: 10.8 FL (ref 8.9–12.7)
POTASSIUM SERPL-SCNC: 4.1 MMOL/L (ref 3.5–5.3)
PROT SERPL-MCNC: 6.6 G/DL (ref 6.4–8.4)
RBC # BLD AUTO: 4.15 MILLION/UL (ref 3.81–5.12)
SODIUM SERPL-SCNC: 141 MMOL/L (ref 135–147)
TRIGL SERPL-MCNC: 93 MG/DL (ref ?–150)
WBC # BLD AUTO: 6.35 THOUSAND/UL (ref 4.31–10.16)

## 2025-07-02 PROCEDURE — 80061 LIPID PANEL: CPT

## 2025-07-02 PROCEDURE — 85025 COMPLETE CBC W/AUTO DIFF WBC: CPT

## 2025-07-02 PROCEDURE — 36415 COLL VENOUS BLD VENIPUNCTURE: CPT

## 2025-07-02 PROCEDURE — 80053 COMPREHEN METABOLIC PANEL: CPT

## 2025-07-02 RX ORDER — AMOXICILLIN 500 MG/1
1 CAPSULE ORAL 2 TIMES DAILY
COMMUNITY
Start: 2025-06-27

## 2025-07-03 ENCOUNTER — OFFICE VISIT (OUTPATIENT)
Dept: INTERNAL MEDICINE CLINIC | Age: 80
End: 2025-07-03
Payer: COMMERCIAL

## 2025-07-03 VITALS
DIASTOLIC BLOOD PRESSURE: 70 MMHG | OXYGEN SATURATION: 95 % | BODY MASS INDEX: 28.51 KG/M2 | SYSTOLIC BLOOD PRESSURE: 118 MMHG | TEMPERATURE: 97.7 F | HEIGHT: 64 IN | HEART RATE: 98 BPM | WEIGHT: 167 LBS

## 2025-07-03 DIAGNOSIS — Z12.11 SCREENING FOR MALIGNANT NEOPLASM OF COLON: ICD-10-CM

## 2025-07-03 DIAGNOSIS — K21.9 GASTROESOPHAGEAL REFLUX DISEASE WITHOUT ESOPHAGITIS: Primary | ICD-10-CM

## 2025-07-03 DIAGNOSIS — L71.9 ROSACEA: ICD-10-CM

## 2025-07-03 DIAGNOSIS — F41.9 ANXIETY: ICD-10-CM

## 2025-07-03 DIAGNOSIS — E78.00 HYPERCHOLESTEROLEMIA: ICD-10-CM

## 2025-07-03 PROBLEM — S02.5XXD CLOSED FRACTURE OF TOOTH WITH ROUTINE HEALING: Status: RESOLVED | Noted: 2024-01-31 | Resolved: 2025-07-03

## 2025-07-03 PROCEDURE — 99214 OFFICE O/P EST MOD 30 MIN: CPT | Performed by: FAMILY MEDICINE

## 2025-07-03 PROCEDURE — G2211 COMPLEX E/M VISIT ADD ON: HCPCS | Performed by: FAMILY MEDICINE

## 2025-07-03 RX ORDER — ALPRAZOLAM 0.5 MG
0.5 TABLET ORAL DAILY PRN
Qty: 30 TABLET | Refills: 0 | Status: SHIPPED | OUTPATIENT
Start: 2025-07-03

## 2025-07-03 NOTE — PROGRESS NOTES
Assessment/Plan:    1. Gastroesophageal reflux disease without esophagitis  2. Anxiety  -     ALPRAZolam (XANAX) 0.5 mg tablet; Take 1 tablet (0.5 mg total) by mouth daily as needed (prn)  3. Screening for malignant neoplasm of colon  -     Ambulatory Referral to Colorectal Surgery; Future  4. Rosacea  5. Hypercholesterolemia  -     Lipid Panel with Direct LDL reflex; Future  -     Comprehensive metabolic panel; Future  -     CBC and differential; Future        BMI counseling: the BMI is above normal. Nutrition recommendations include reducing portion sizes and increasing intake of lean protein. Exercise recommendations include moderate aerobic physical activity for 150 minutes/week.      There are no Patient Instructions on file for this visit.    Return in about 6 months (around 1/3/2026).    Subjective:      Patient ID: Jaja Griffith is a 79 y.o. female.    Chief Complaint   Patient presents with    Follow-up     6 month follow up  Labs 7/2/24- colonoscopy ordered for patient she will go to         HPI      Hyperlipidemia (Follow-Up): The patient states her hyperlipidemia has been under good control since the last visit. She has no comorbid illnesses.   Symptoms: denies chest pain,-denies intermittent leg claudication,-denies muscle pain-and-denies muscle weakness. Associated symptoms include no focal neurologic deficits-and-no memory loss.   Medications: the patient is adherent with her medication regimen.-the patient complains of medication side effects. The patient is doing well with her hyperlipidemia goals. the patient's LDL goal is <100 mg/dL.-the patient's last LDL was 72 mg/dL.-(5/2014).  February 2019, due for labs and has lab slip  August 2019, very well controlled  March 2020:  Not able to get labs done since they are closed right now however compliant with the medication and does not need refills  November 2021, had lab work done in September.  Nicely controlled levels.  No issueson simvastatin  20 mg daily and levels are well controlled.  No side effects.   DEc 2022: well controlled; no issues  June 2023, well controlled, no issues, remains very active  December 2023, significant improvement in lipid levels, taking simvastatin and tolerating well.  Has been very active  June 2024, lipid levels improved, no issues.  Patient remains very active  December 2024, lipid levels well-controlled, tolerating medications remains very active  July 2025, lipid levels in range remains very active no new issues     Gastroesophageal Reflux Disease (Follow-Up): The patient is being seen for follow-up of gastroesophageal reflux disease.  Is essentially resolved symptoms.  Not on anything on a regular basis. March 2020, well controlled November 2021, controlled.  Not on daily PPI  DEc 2022: stable   June 2023, no issues, as needed pantoprazole if necessary  December 2023, heartburn well-controlled, takes pantoprazole intermittently  June 2024, no issues heartburns well-controlled  December 2024, GERD well-controlled, no new issues, takes medications as needed  July 2025, no heartburn well-controlled with medications     Depression (Follow-Up): The patient states her depression has been stable since the last visit. She has no comorbid illnesses.   Interval Events: doing well but  recently dx with lung cancer after brain surgery and colonoscopy. She has had no significant interval events.   Interval Symptoms: improved depression,-improved depressed mood,-improved loss of interest or pleasure in activities,-stable insomnia,-denies feelings of worthlessness,-denies feelings of guilt,-denies trouble concentrating,-improved anxiety-and-denies sexual dysfunction.  February 2019,  passed away in the winter 2018 but she has a very large support system and is doing relatively well.  Taking it day by day  Associated symptoms include: no recent weight gain-and-no thoughts of suicide.   Social Support: the patient has good  social support.   Medications: the patient is adherent with her medication regimen.-She denies medication side effects.  Pt has been on zoloft >5 years.  States she feels really stable on it and does not feel the need to go off of it August 2019, doing very well and does not require any medication changes  March 2020, doing very well with present dose Zoloft with no breakthrough symptoms.  No suicidal or homicidal ideations   April 2021, no HI or SI and tolerating medication well.  Symptoms are well controlled with no flare ups.  Does not need a change in dose.   November 2021, no HI or SI.  Doing well with Zoloft.  No breakthrough symptoms.  June 2023, well controlled off remains on Zoloft, does not want dose change, infrequently uses Xanax, does a lot of yard work  December 2023, no HI or SI, on Zoloft daily, minimal use of alprazolam  June 2024, patient having fluctuations of symptoms of depression and crying spells her daughter is going through a divorce after 14 years which was a surprise for everyone.  She would like an increase in her medications.  No HI or SI  December 2024, doing well with Zoloft does not want a dose change, no HI or SI  July 2025, does not want dose change, sold her house moved in June attachment to her daughter's house and broke up with her boyfriend of 3 years which she felt were all good changes.  All these have been over 1 year and she is now settled.  No HI or SI     Osteopenia of multiple sites.  Takes vitamin-D and calcium on a regular basis and is very active.  Up-to-date on bone density study.  December 2024, up-to-date, takes calcium and vitamin D and remains very active  July 2025, no new issues remains very active        The following portions of the patient's history were reviewed and updated as appropriate: allergies, current medications, past family history, past medical history, past social history, past surgical history and problem list.    Review of Systems   "      Constitutional:  Denies fever or chills , intentional weight loss  Eyes:  Denies double , blurry vision or eye pain  HENT:  Denies nasal congestion, sore throat or new hearing issues  Respiratory:  Denies cough or shortness of breath or wheezing  Cardiovascular:  Denies palpitations or chest pain  GI:  Denies abdominal pain, nausea, or vomiting, no loose stools, no reflux  Integument:  Denies rash , no open areas  Neurologic:  Denies headache or focal weakness, no dizziness  : no dysuria, or hematuria      Current Medications[1]    Objective:    /70 (BP Location: Left arm, Patient Position: Sitting, Cuff Size: Standard)   Pulse 98   Temp 97.7 °F (36.5 °C) (Temporal)   Ht 5' 4\" (1.626 m)   Wt 75.8 kg (167 lb)   SpO2 95%   BMI 28.67 kg/m²        Physical Exam      Constitutional:  Well developed, well nourished, no acute distress, non-toxic appearance   Eyes:  PERRL, conjunctiva normal , non icteric sclera  HENT:  Atraumatic, oropharynx moist. Neck-  supple   Respiratory:  CTA b/l, normal breath sounds, no rales, no wheezing   Cardiovascular:  RRR, no murmurs, no LE edema b/l  GI:  Soft, nondistended, normal bowel sounds x 4, nontender, no organomegaly, no mass, no rebound, no guarding   Neurologic:  no focal deficits noted   Psychiatric:  Speech and behavior appropriate , AAO x 3      Daryn Cristobal DO         [1]   Current Outpatient Medications   Medication Sig Dispense Refill    ALPRAZolam (XANAX) 0.5 mg tablet Take 1 tablet (0.5 mg total) by mouth daily as needed (prn) 30 tablet 0    amoxicillin (AMOXIL) 500 mg capsule Take 1 capsule by mouth in the morning and 1 capsule before bedtime.      B Complex Vitamins (B COMPLEX PO) Take 1 capsule by mouth in the morning.      CALCIUM PO Take 1,200 mg by mouth in the morning.      Cholecalciferol (VITAMIN D3 PO) Take 1 capsule by mouth in the morning.      Diclofenac Sodium (VOLTAREN) 1 % Apply 2 g topically in the morning and 2 g at noon and 2 g in the " evening and 2 g before bedtime.      gabapentin (NEURONTIN) 300 mg capsule TAKE 1 CAPSULE BY MOUTH EVERY DAY AT BEDTIME 90 capsule 1    pantoprazole (PROTONIX) 40 mg tablet TAKE 1 TABLET BY MOUTH EVERY DAY AS NEEDED 90 tablet 1    sertraline (ZOLOFT) 100 mg tablet TAKE 1 TABLET BY MOUTH EVERY DAY AT BEDTIME 90 tablet 1    sertraline (ZOLOFT) 25 mg tablet TAKE 1 TABLET BY MOUTH EVERY DAY IN ADDITION TO A 100 MG TABLET EVERY DAY 90 tablet 1    simvastatin (ZOCOR) 20 mg tablet TAKE 1 TABLET BY MOUTH EVERY DAY AT BEDTIME 90 tablet 1     No current facility-administered medications for this visit.

## (undated) DEVICE — 3M™ IOBAN™ 2 ANTIMICROBIAL INCISE DRAPE 6650EZ: Brand: IOBAN™ 2

## (undated) DEVICE — THE SIMPULSE SOLO SYSTEM WITH ULTREX RETRACTABLE SPLASH SHIELD TIP: Brand: SIMPULSE SOLO

## (undated) DEVICE — BETHLEHEM UNIV TOTAL KNEE, KIT: Brand: CARDINAL HEALTH

## (undated) DEVICE — CUFF TOURNIQUET 30 X 4 IN QUICK CONNECT DISP 1BLA

## (undated) DEVICE — ABDOMINAL PAD: Brand: DERMACEA

## (undated) DEVICE — DRAPE EQUIPMENT RF WAND

## (undated) DEVICE — SPONGE PVP SCRUB WING STERILE

## (undated) DEVICE — SPONGE GAUZE 4 X 9

## (undated) DEVICE — SCD SEQUENTIAL COMPRESSION COMFORT SLEEVE MEDIUM KNEE LENGTH: Brand: KENDALL SCD

## (undated) DEVICE — ACE WRAP 6 IN STERILE

## (undated) DEVICE — DUAL CUT SAGITTAL BLADE

## (undated) DEVICE — TRAY FOLEY 16FR URIMETER SURESTEP

## (undated) DEVICE — DRAPE SHEET X-LG

## (undated) DEVICE — NO-SCRATCH ™ SMALL WHITNEY CURETTE ™ IS A SINGLE-USE, PLASTIC CURETTE FOR QUICKLY APPLYING, MANIPULATING AND REMOVING BONE CEMENT DURING HIP AND KNEE REPLACEMENT SURGERY. THE PLASTIC IS SOFTER THAN STEEL INSTRUMENTS, REDUCING THE RISK OF DAMAGING THE PROSTHESIS WITH METAL INSTRUMENTS.  THE CURETTE’S 6MM TIP REMOVES EXCESS CEMENT FROM REPLACEMENT HIPS AND KNEES. EASY-TO-MANEUVER, THE SMALL BLUE CURETTE LETS YOU REMOVE CEMENT FROM ALL EDGES OF THE PROSTHESIS.NO-SCRATCH WHITNEY SMALL CURETTE FEATURES:SAFER THAN STEEL- MADE OF PLASTIC - STURDY YET SOFTER THAN SURGICAL STEEL.HANDIER- EACH TOOL HAS A MOLDED-IN THUMB INDENTATION INSTANTLY ORIENTING THE TOOL.- EASIER TO MANEUVER IN HARD TO SEE PLACES.- COLOR-CODED FOR EASY IDENTIFICATION.FASTER- COMES INDIVIDUALLY PACKAGED IN STERILE, PEEL OPEN POUCH, READY TO GO.- APPLIES, MANIPULATES, OR REMOVES CEMENT WITH FINGERTIP PRECISION.ECONOMICAL- THE COST OF A SINGLE REVISION DWARFS THE COST OF A SINGLE-USE CURETTE. - DISPOSABLE – THERE’S NO NEED TO WASTE TIME REMOVING HARDENED CEMENT OR RE-STERILIZING TOOLS.- LESS EXPENSIVE TO BUY AND INVENTORY - ORDER ONLY THE TOOL YOU USE.- PACKAGED 25 INDIVIDUALLY WRAPPED TOOLS TO A CARTON FOR CONVENIENT SHELF STORAGE.: Brand: WHITNEY NO-SCRATCH CURETTE (SMALL)

## (undated) DEVICE — SUT VICRYL PLUS 1 CTB-1 36 IN VCPB947H

## (undated) DEVICE — GLOVE INDICATOR PI UNDERGLOVE SZ 8.5 BLUE

## (undated) DEVICE — GAUZE SPONGES,16 PLY: Brand: CURITY

## (undated) DEVICE — RECIPROCATING BLADE, DOUBLE SIDED (70.0 X 0.96 X 12.5MM)

## (undated) DEVICE — STOCKINETTE REGULAR

## (undated) DEVICE — ACE WRAP 4 IN UNSTERILE

## (undated) DEVICE — JP 3-SPRING RES W/10FR PVC DRAIN/TR: Brand: CARDINAL HEALTH

## (undated) DEVICE — CASSETTE DRAPE: Brand: UNBRANDED

## (undated) DEVICE — PLUMEPEN PRO 10FT

## (undated) DEVICE — SILVER-COATED ANTIMICROBIAL BARRIER DRESSING: Brand: ACTICOAT   4" X 8"

## (undated) DEVICE — HOOD: Brand: FLYTE, SURGICOOL

## (undated) DEVICE — SUT VICRYL PLUS 2-0 CTB-1 27 IN VCPB259H

## (undated) DEVICE — CHLORAPREP HI-LITE 26ML ORANGE

## (undated) DEVICE — COOL TEMP PAD

## (undated) DEVICE — CAPIT KNEE ATTUNE FB CEMENT - DEPUY

## (undated) DEVICE — GLOVE SRG BIOGEL 8

## (undated) DEVICE — SPINNING CEMENT MIXING BOWL

## (undated) DEVICE — PADDING CAST 4 IN  COTTON STRL

## (undated) DEVICE — INTENDED FOR TISSUE SEPARATION, AND OTHER PROCEDURES THAT REQUIRE A SHARP SURGICAL BLADE TO PUNCTURE OR CUT.: Brand: BARD-PARKER SAFETY BLADES SIZE 10, STERILE